# Patient Record
Sex: MALE | Race: WHITE | Employment: OTHER | URBAN - NONMETROPOLITAN AREA
[De-identification: names, ages, dates, MRNs, and addresses within clinical notes are randomized per-mention and may not be internally consistent; named-entity substitution may affect disease eponyms.]

---

## 2019-02-27 DIAGNOSIS — F41.1 GENERALIZED ANXIETY DISORDER: ICD-10-CM

## 2019-03-04 DIAGNOSIS — C44.92 SQUAMOUS CELL SKIN CANCER: ICD-10-CM

## 2019-03-04 RX ORDER — LORAZEPAM 1 MG/1
1 TABLET ORAL NIGHTLY
Qty: 30 TABLET | Refills: 3 | Status: SHIPPED | OUTPATIENT
Start: 2019-03-04 | End: 2019-04-03

## 2019-04-10 ENCOUNTER — OUTSIDE SERVICES (OUTPATIENT)
Dept: FAMILY MEDICINE CLINIC | Age: 74
End: 2019-04-10
Payer: MEDICARE

## 2019-04-10 DIAGNOSIS — B35.3 TINEA PEDIS OF BOTH FEET: Primary | ICD-10-CM

## 2019-04-10 RX ORDER — ACETAMINOPHEN 325 MG/1
650 TABLET ORAL EVERY 6 HOURS PRN
COMMUNITY

## 2019-04-10 RX ORDER — LORAZEPAM 1 MG/1
1 TABLET ORAL NIGHTLY
COMMUNITY
End: 2019-06-13

## 2019-04-10 RX ORDER — MIRTAZAPINE 15 MG/1
15 TABLET, FILM COATED ORAL NIGHTLY
COMMUNITY

## 2019-04-10 RX ORDER — KETOCONAZOLE, MICRONIZED 100 %
POWDER (GRAM) MISCELLANEOUS DAILY PRN
COMMUNITY
End: 2019-06-13

## 2019-04-10 RX ORDER — PRAVASTATIN SODIUM 40 MG
40 TABLET ORAL DAILY
COMMUNITY
End: 2022-04-26 | Stop reason: SDUPTHER

## 2019-04-10 RX ORDER — CETIRIZINE HYDROCHLORIDE 10 MG/1
10 TABLET ORAL DAILY
COMMUNITY

## 2019-04-10 RX ORDER — KETOCONAZOLE 20 MG/G
CREAM TOPICAL 2 TIMES DAILY
COMMUNITY
End: 2019-06-13

## 2019-04-10 RX ORDER — LEVETIRACETAM 500 MG/1
1500 TABLET ORAL 2 TIMES DAILY
COMMUNITY

## 2019-04-10 ASSESSMENT — ENCOUNTER SYMPTOMS
SHORTNESS OF BREATH: 0
ALLERGIC/IMMUNOLOGIC NEGATIVE: 1
COUGH: 0
EYES NEGATIVE: 1

## 2019-04-10 NOTE — PROGRESS NOTES
Elina Heard is a 68 y.o. male who presents today for his medical conditions/complaints as noted below. Chief Complaint   Patient presents with    Rash           HPI:     HPI     Seen today for complaints to rash on bilateral feet. Right greater than left. States it started about a month ago and he was trying to make it better by applying Thomas Bhargav to it but that made it burn a lot. States areas are very itching and have spread to the tops of his feet. Current Outpatient Medications   Medication Sig Dispense Refill    zonisamide (ZONEGRAN) 100 MG capsule Take 100 mg by mouth nightly. 2 capsules at bedtime      OXcarbazepine (TRILEPTAL) 600 MG tablet Take 600 mg by mouth 2 times daily.  phenytoin (DILANTIN) 100 MG ER capsule Take 100 mg by mouth 2 times daily. 2 capsule 2 times daily      Calcium Carbonate-Vit D-Min (CALTRATE 600+D PLUS MINERALS) 600-800 MG-UNIT TABS Take 1 tablet by mouth 2 times daily.  Multiple Vitamins-Minerals (CENTRUM SILVER PO) Take 1 tablet by mouth daily.  Aspirin (ANACIN PO) Take  by mouth as needed. No current facility-administered medications for this visit. No Known Allergies    Subjective:      Review of Systems   Constitutional: Negative. HENT: Negative. Eyes: Negative. Respiratory: Negative for cough and shortness of breath. Cardiovascular: Negative. Negative for leg swelling. Endocrine: Negative. Genitourinary: Negative. Skin: Positive for rash. Allergic/Immunologic: Negative. Hematological: Negative. Psychiatric/Behavioral: Negative. Objective: There were no vitals taken for this visit. Physical Exam   Constitutional: He is oriented to person, place, and time. He appears well-developed and well-nourished. HENT:   Head: Normocephalic and atraumatic. Mouth/Throat: Oropharynx is clear and moist.   Eyes: Pupils are equal, round, and reactive to light.  Conjunctivae and EOM are normal.   Neck: Normal range of motion. Cardiovascular: Intact distal pulses. Pulmonary/Chest: Effort normal.   Abdominal: Soft. Musculoskeletal: Normal range of motion. He exhibits no edema, tenderness or deformity. Feet:   Left Foot:   Skin Integrity: Positive for erythema and dry skin. Neurological: He is alert and oriented to person, place, and time. Skin: Skin is warm and dry. Capillary refill takes 2 to 3 seconds. Rash noted. Rash is macular, papular and urticarial. There is erythema. Psychiatric: He has a normal mood and affect. His behavior is normal. Judgment and thought content normal.         Assessment:       Diagnosis Orders   1. Tinea pedis of both feet         Plan:     1. Will start Ketaconazole 2 % cream to feet bid after cleaning. Powder to shoes. Clean shower and dry socks, change every day. If no improvement will add soaks of Dakin's solution.      Electronically signed by ROBINA Kate CNP on 4/10/2019 at 2:20 PM

## 2019-06-13 ENCOUNTER — OUTSIDE SERVICES (OUTPATIENT)
Dept: FAMILY MEDICINE CLINIC | Age: 74
End: 2019-06-13
Payer: MEDICARE

## 2019-06-13 VITALS
WEIGHT: 172.8 LBS | RESPIRATION RATE: 16 BRPM | BODY MASS INDEX: 28.1 KG/M2 | HEART RATE: 81 BPM | OXYGEN SATURATION: 96 % | DIASTOLIC BLOOD PRESSURE: 76 MMHG | SYSTOLIC BLOOD PRESSURE: 140 MMHG | TEMPERATURE: 97.6 F

## 2019-06-13 DIAGNOSIS — Z85.89 HX OF SQUAMOUS CELL CARCINOMA: ICD-10-CM

## 2019-06-13 DIAGNOSIS — I10 ESSENTIAL HYPERTENSION: Primary | ICD-10-CM

## 2019-06-13 DIAGNOSIS — G40.909 SEIZURE DISORDER (HCC): ICD-10-CM

## 2019-06-13 RX ORDER — AMLODIPINE BESYLATE 5 MG/1
5 TABLET ORAL DAILY
COMMUNITY

## 2019-06-13 RX ORDER — LISINOPRIL 10 MG/1
10 TABLET ORAL DAILY
COMMUNITY
End: 2022-04-26 | Stop reason: SDUPTHER

## 2019-06-13 ASSESSMENT — ENCOUNTER SYMPTOMS
EYE REDNESS: 0
RHINORRHEA: 0
CONSTIPATION: 0
COLOR CHANGE: 1
SHORTNESS OF BREATH: 0
VOMITING: 0
DIARRHEA: 0
COUGH: 0
SORE THROAT: 0
WHEEZING: 0
NAUSEA: 0

## 2019-06-13 NOTE — PROGRESS NOTES
tablet Take 10 mg by mouth daily      acetaminophen (TYLENOL) 325 MG tablet Take 650 mg by mouth every 6 hours as needed for Pain      mirtazapine (REMERON) 15 MG tablet Take 15 mg by mouth nightly      pravastatin (PRAVACHOL) 40 MG tablet Take 40 mg by mouth daily      Hypromellose (GENTEAL MILD TO MODERATE) 0.3 % SOLN Apply 1 drop to eye daily      levETIRAcetam (KEPPRA) 500 MG tablet Take 1,250 mg by mouth 2 times daily       cetirizine (ZYRTEC) 10 MG tablet Take 10 mg by mouth daily      Multiple Vitamins-Minerals (CENTRUM SILVER PO) Take 1 tablet by mouth daily. No current facility-administered medications for this visit. No Known Allergies    Health Maintenance   Topic Date Due    AAA screen  1945    Hepatitis C screen  1945    DTaP/Tdap/Td vaccine (1 - Tdap) 11/11/1964    Lipid screen  11/11/1985    Shingles Vaccine (1 of 2) 11/11/1995    Colon cancer screen colonoscopy  11/11/1995    Pneumococcal 65+ years Vaccine (1 of 2 - PCV13) 11/11/2010    Flu vaccine (Season Ended) 09/01/2019       Subjective:      Review of Systems   Constitutional: Positive for fatigue. Negative for chills, fever and unexpected weight change. HENT: Positive for hearing loss. Negative for congestion, rhinorrhea and sore throat. Eyes: Negative for redness and visual disturbance. Respiratory: Negative for cough, shortness of breath and wheezing. Cardiovascular: Negative for chest pain and leg swelling. Gastrointestinal: Negative for constipation, diarrhea, nausea and vomiting. Endocrine: Negative for polydipsia and polyuria. Genitourinary: Negative for dysuria, frequency and hematuria. Musculoskeletal: Negative for arthralgias, gait problem and myalgias. Skin: Positive for color change. Negative for rash and wound. Multiple seborrheic keratosis on back   Allergic/Immunologic: Negative for environmental allergies and immunocompromised state.    Neurological: Positive for seizures (2 in the past week), weakness and headaches. Negative for dizziness and light-headedness. Hematological: Does not bruise/bleed easily. Psychiatric/Behavioral: Negative for dysphoric mood and sleep disturbance. The patient is not nervous/anxious. Objective:     Physical Exam   Constitutional: He is oriented to person, place, and time. He appears well-developed and well-nourished. No distress. HENT:   Head: Normocephalic and atraumatic. Right Ear: External ear normal.   Left Ear: External ear normal.   Nose: Nose normal.   Mouth/Throat: Oropharynx is clear and moist and mucous membranes are normal.   Eyes: Conjunctivae and EOM are normal. Right eye exhibits no discharge. Left eye exhibits no discharge. No scleral icterus. Neck: Neck supple. No JVD present. No thyromegaly present. Cardiovascular: Normal rate, regular rhythm, normal heart sounds and intact distal pulses. Pulmonary/Chest: Effort normal and breath sounds normal. No stridor. No respiratory distress. He has no wheezes. He has no rales. Abdominal: Soft. Bowel sounds are normal. He exhibits no distension. There is no tenderness. Musculoskeletal: Normal range of motion. He exhibits no edema or deformity. Right shoulder: He exhibits no tenderness, no bony tenderness and no pain. Left shoulder: He exhibits no tenderness, no bony tenderness and no pain. Cervical back: He exhibits no tenderness, no bony tenderness and no pain. Thoracic back: He exhibits no tenderness, no bony tenderness, no pain and no spasm. Lumbar back: He exhibits no tenderness, no bony tenderness and no pain. Lymphadenopathy:     He has no cervical adenopathy. Right: No supraclavicular adenopathy present. Left: No supraclavicular adenopathy present. Neurological: He is alert and oriented to person, place, and time. He displays no atrophy. He exhibits normal muscle tone. He displays no seizure activity.

## 2019-07-01 ENCOUNTER — OUTSIDE SERVICES (OUTPATIENT)
Dept: FAMILY MEDICINE CLINIC | Age: 74
End: 2019-07-01

## 2019-07-01 VITALS
SYSTOLIC BLOOD PRESSURE: 142 MMHG | BODY MASS INDEX: 27.62 KG/M2 | HEART RATE: 78 BPM | WEIGHT: 169.8 LBS | DIASTOLIC BLOOD PRESSURE: 78 MMHG | TEMPERATURE: 97.9 F | OXYGEN SATURATION: 96 % | RESPIRATION RATE: 20 BRPM

## 2019-07-01 DIAGNOSIS — K04.7 DENTAL ABSCESS: Primary | ICD-10-CM

## 2019-07-01 ASSESSMENT — ENCOUNTER SYMPTOMS
DIARRHEA: 0
CONSTIPATION: 0
COUGH: 0
NAUSEA: 0
WHEEZING: 0
SORE THROAT: 0
RHINORRHEA: 0
VOMITING: 0
SHORTNESS OF BREATH: 0

## 2019-07-01 NOTE — PROGRESS NOTES
Negative for dysuria, frequency and urgency. Musculoskeletal: Negative for arthralgias and myalgias. Skin: Negative for pallor and rash. Objective:     BP (!) 142/78   Pulse 78   Temp 97.9 °F (36.6 °C)   Resp 20   Wt 169 lb 12.8 oz (77 kg)   SpO2 96%   BMI 27.62 kg/m²     Physical Exam   Constitutional: He is oriented to person, place, and time. He appears well-developed and well-nourished. No distress. HENT:   Head: Normocephalic and atraumatic. Mouth/Throat: Abnormal dentition. Dental abscesses present. Eyes: Right eye exhibits no discharge. Left eye exhibits no discharge. No scleral icterus. Cardiovascular: Normal rate, regular rhythm and intact distal pulses. Pulmonary/Chest: Effort normal and breath sounds normal. No respiratory distress. He has no wheezes. Abdominal: Soft. Bowel sounds are normal. He exhibits no distension. There is no tenderness. Musculoskeletal: He exhibits no edema or deformity. Neurological: He is alert and oriented to person, place, and time. Skin: Skin is warm and dry. Nursing note and vitals reviewed. Assessment:       Diagnosis Orders   1. Dental abscess         Plan:     1. Dental abscess - Will begin amoxicillin for abscess. Will need a dentist appointment as it appears there may be a root showing. Tramadol for pain q6h PRN for 7 days. Discussed with patient and he is agreeable. Sister is POA and he does not want to bother her while on vacation.

## 2019-12-18 ENCOUNTER — HOSPITAL ENCOUNTER (OUTPATIENT)
Dept: CT IMAGING | Age: 74
Discharge: HOME | End: 2019-12-18
Payer: MEDICARE

## 2019-12-18 DIAGNOSIS — K13.21 ORAL LEUKOPLAKIA: ICD-10-CM

## 2019-12-18 DIAGNOSIS — Z87.891 SMOKING HISTORY: ICD-10-CM

## 2019-12-18 LAB — POC CREATININE WHOLE BLOOD: 0.9 MG/DL (ref 0.5–1.2)

## 2019-12-18 PROCEDURE — 82565 ASSAY OF CREATININE: CPT

## 2019-12-18 PROCEDURE — 71260 CT THORAX DX C+: CPT

## 2019-12-18 PROCEDURE — 6360000004 HC RX CONTRAST MEDICATION: Performed by: OTOLARYNGOLOGY

## 2019-12-18 PROCEDURE — 70491 CT SOFT TISSUE NECK W/DYE: CPT

## 2019-12-18 RX ADMIN — IOPAMIDOL 85 ML: 755 INJECTION, SOLUTION INTRAVENOUS at 07:44

## 2020-01-01 ENCOUNTER — APPOINTMENT (OUTPATIENT)
Dept: GENERAL RADIOLOGY | Age: 75
End: 2020-01-01
Payer: MEDICARE

## 2020-01-01 ENCOUNTER — HOSPITAL ENCOUNTER (EMERGENCY)
Age: 75
Discharge: HOME | End: 2020-01-01
Attending: EMERGENCY MEDICINE
Payer: MEDICARE

## 2020-01-01 VITALS
DIASTOLIC BLOOD PRESSURE: 62 MMHG | WEIGHT: 156 LBS | TEMPERATURE: 97.7 F | RESPIRATION RATE: 15 BRPM | BODY MASS INDEX: 24.48 KG/M2 | OXYGEN SATURATION: 97 % | SYSTOLIC BLOOD PRESSURE: 129 MMHG | HEIGHT: 67 IN | HEART RATE: 63 BPM

## 2020-01-01 LAB
ANION GAP SERPL CALCULATED.3IONS-SCNC: 11 MEQ/L (ref 8–16)
BASOPHILS # BLD: 0.5 %
BASOPHILS ABSOLUTE: 0 THOU/MM3 (ref 0–0.1)
BUN BLDV-MCNC: 17 MG/DL (ref 7–22)
CALCIUM SERPL-MCNC: 8.8 MG/DL (ref 8.5–10.5)
CHLORIDE BLD-SCNC: 109 MEQ/L (ref 98–111)
CO2: 21 MEQ/L (ref 23–33)
CREAT SERPL-MCNC: 0.8 MG/DL (ref 0.4–1.2)
EKG ATRIAL RATE: 69 BPM
EKG P AXIS: 69 DEGREES
EKG P-R INTERVAL: 154 MS
EKG Q-T INTERVAL: 414 MS
EKG QRS DURATION: 84 MS
EKG QTC CALCULATION (BAZETT): 443 MS
EKG R AXIS: 24 DEGREES
EKG T AXIS: 35 DEGREES
EKG VENTRICULAR RATE: 69 BPM
EOSINOPHIL # BLD: 4.7 %
EOSINOPHILS ABSOLUTE: 0.3 THOU/MM3 (ref 0–0.4)
ERYTHROCYTE [DISTWIDTH] IN BLOOD BY AUTOMATED COUNT: 12.6 % (ref 11.5–14.5)
ERYTHROCYTE [DISTWIDTH] IN BLOOD BY AUTOMATED COUNT: 44.8 FL (ref 35–45)
GFR SERPL CREATININE-BSD FRML MDRD: > 90 ML/MIN/1.73M2
GLUCOSE BLD-MCNC: 88 MG/DL (ref 70–108)
HCT VFR BLD CALC: 38.4 % (ref 42–52)
HEMOGLOBIN: 13.3 GM/DL (ref 14–18)
IMMATURE GRANS (ABS): 0.02 THOU/MM3 (ref 0–0.07)
IMMATURE GRANULOCYTES: 0.4 %
LYMPHOCYTES # BLD: 19 %
LYMPHOCYTES ABSOLUTE: 1 THOU/MM3 (ref 1–4.8)
MCH RBC QN AUTO: 33.5 PG (ref 26–33)
MCHC RBC AUTO-ENTMCNC: 34.6 GM/DL (ref 32.2–35.5)
MCV RBC AUTO: 96.7 FL (ref 80–94)
MONOCYTES # BLD: 8.9 %
MONOCYTES ABSOLUTE: 0.5 THOU/MM3 (ref 0.4–1.3)
NUCLEATED RED BLOOD CELLS: 0 /100 WBC
OSMOLALITY CALCULATION: 282.2 MOSMOL/KG (ref 275–300)
PLATELET # BLD: 204 THOU/MM3 (ref 130–400)
PMV BLD AUTO: 9.9 FL (ref 9.4–12.4)
POTASSIUM SERPL-SCNC: 4.5 MEQ/L (ref 3.5–5.2)
RBC # BLD: 3.97 MILL/MM3 (ref 4.7–6.1)
SEG NEUTROPHILS: 66.5 %
SEGMENTED NEUTROPHILS ABSOLUTE COUNT: 3.7 THOU/MM3 (ref 1.8–7.7)
SODIUM BLD-SCNC: 141 MEQ/L (ref 135–145)
TROPONIN T: < 0.01 NG/ML
WBC # BLD: 5.5 THOU/MM3 (ref 4.8–10.8)

## 2020-01-01 PROCEDURE — 85025 COMPLETE CBC W/AUTO DIFF WBC: CPT

## 2020-01-01 PROCEDURE — 99284 EMERGENCY DEPT VISIT MOD MDM: CPT

## 2020-01-01 PROCEDURE — 93005 ELECTROCARDIOGRAM TRACING: CPT | Performed by: EMERGENCY MEDICINE

## 2020-01-01 PROCEDURE — 73030 X-RAY EXAM OF SHOULDER: CPT

## 2020-01-01 PROCEDURE — 96374 THER/PROPH/DIAG INJ IV PUSH: CPT

## 2020-01-01 PROCEDURE — 93010 ELECTROCARDIOGRAM REPORT: CPT | Performed by: INTERNAL MEDICINE

## 2020-01-01 PROCEDURE — 80048 BASIC METABOLIC PNL TOTAL CA: CPT

## 2020-01-01 PROCEDURE — 84484 ASSAY OF TROPONIN QUANT: CPT

## 2020-01-01 PROCEDURE — 96375 TX/PRO/DX INJ NEW DRUG ADDON: CPT

## 2020-01-01 PROCEDURE — 6360000002 HC RX W HCPCS: Performed by: EMERGENCY MEDICINE

## 2020-01-01 PROCEDURE — 36415 COLL VENOUS BLD VENIPUNCTURE: CPT

## 2020-01-01 RX ORDER — LOPERAMIDE HYDROCHLORIDE 2 MG/1
2 CAPSULE ORAL 4 TIMES DAILY PRN
COMMUNITY

## 2020-01-01 RX ORDER — METOCLOPRAMIDE HYDROCHLORIDE 5 MG/ML
10 INJECTION INTRAMUSCULAR; INTRAVENOUS ONCE
Status: COMPLETED | OUTPATIENT
Start: 2020-01-01 | End: 2020-01-01

## 2020-01-01 RX ORDER — KETOROLAC TROMETHAMINE 30 MG/ML
30 INJECTION, SOLUTION INTRAMUSCULAR; INTRAVENOUS ONCE
Status: COMPLETED | OUTPATIENT
Start: 2020-01-01 | End: 2020-01-01

## 2020-01-01 RX ADMIN — METOCLOPRAMIDE 10 MG: 5 INJECTION, SOLUTION INTRAMUSCULAR; INTRAVENOUS at 13:03

## 2020-01-01 RX ADMIN — KETOROLAC TROMETHAMINE 30 MG: 30 INJECTION, SOLUTION INTRAMUSCULAR at 13:04

## 2020-01-01 ASSESSMENT — PAIN DESCRIPTION - PAIN TYPE
TYPE: ACUTE PAIN

## 2020-01-01 ASSESSMENT — PAIN SCALES - GENERAL
PAINLEVEL_OUTOF10: 4
PAINLEVEL_OUTOF10: 4
PAINLEVEL_OUTOF10: 9

## 2020-01-01 ASSESSMENT — PAIN DESCRIPTION - DESCRIPTORS: DESCRIPTORS: THROBBING

## 2020-01-01 ASSESSMENT — PAIN DESCRIPTION - ORIENTATION: ORIENTATION: RIGHT

## 2020-01-01 ASSESSMENT — PAIN DESCRIPTION - FREQUENCY: FREQUENCY: INTERMITTENT

## 2020-01-01 NOTE — ED NOTES
Pt discharged with instructions, verbalized understanding. Pt given opportunity to ask questions and discuss plan of care with treatment team.  Pt denies additional needs at present time and is in agreement with current plan of care. Pt refused need for w/c assistance. Ambulated out of ED with steady gait in stable condition.        Anuja Flowers RN  01/01/20 [id number]

## 2020-01-01 NOTE — ED PROVIDER NOTES
[facility]  eMERGENCY dEPARTMENT eNCOUnter          [address] [facility]       Chief Complaint   Patient presents with    Arm Pain    Shoulder Pain    Facial Pain    Seizures       Nurses Notes reviewed and I agree except as noted in the HPI. HISTORY OF PRESENT ILLNESS    Jacqueline Vides is a 76 y.o. male. Patient reports a \"blackout episode\" that occurred this morning, described as a possible seizure-like incident. Patient reports that he has a history of epileptic seizures since high school. Family of the patient report that his neurologist has been adjusting his seizure medications lately, with last adjustment reported in October. Patient reports that seizures result in these \"blackout episodes\" that last approximately 10 to 15 minutes. Patient notes that he fell during the incident this morning and has pain to the right shoulder, radiating down the right arm, as well as less significant pain to the right jaw. Patient describes additional symptom of headache but notes that this is normal at baseline. Patient denies chest pain and shortness of breath. Patient additionally has a history of anxiety, cancer, hyperlipidemia, and chronic obstructive pulmonary disease. REVIEW OF SYSTEMS     Constitutional: no fever or chills  Respiratory: no dyspnea  Cardiovascular: no chest pain  Gastrointestinal : no abdominal pain      Remainder of review of systems is otherwise reviewed as negative. PAST MEDICAL HISTORY    has a past medical history of Anxiety disorder, Cancer ([address] COPD (chronic obstructive pulmonary disease) ([facility] [address] HLD (hyperlipidemia), and Seizure disorder ([facility] [address]     SURGICAL HISTORY      has a past surgical history that includes Hemorrhoid surgery and Cataract removal.    CURRENT MEDICATIONS       Previous Medications    ACETAMINOPHEN (TYLENOL) 325 MG TABLET    Take 650 mg by mouth every 6 hours as needed for Pain    AMLODIPINE (NORVASC) 5 MG TABLET    Take 5 mg by mouth daily based the provider's statements to the medical scribe. Signed by: Emily Mcknight, 12:50 PM 01/01/20     Provider: The information in this document, created by the medical scribe for me, accurately reflects the services I personally performed and the decisions made by me.      Jeremy Glez DO. 12:50 PM 01/01/20           Jeremy Glez DO  01/01/20 1437

## 2020-01-01 NOTE — ED NOTES
Per statement of sister at bedside, pt has been having \"blackout episodes,\" which have been diagnosed by his neurologist.  He does not lose consciousness completely, but will continue to speak. He has no remembrance of these episodes. The neurologist has been adjusting his seizure medications.        Kait Katz RN  01/01/20 2222

## 2020-03-25 ENCOUNTER — OUTSIDE SERVICES (OUTPATIENT)
Dept: FAMILY MEDICINE CLINIC | Age: 75
End: 2020-03-25
Payer: MEDICARE

## 2020-03-25 VITALS
DIASTOLIC BLOOD PRESSURE: 99 MMHG | OXYGEN SATURATION: 95 % | WEIGHT: 160 LBS | SYSTOLIC BLOOD PRESSURE: 157 MMHG | HEART RATE: 90 BPM | BODY MASS INDEX: 25.06 KG/M2 | TEMPERATURE: 97.8 F | RESPIRATION RATE: 16 BRPM

## 2020-03-25 PROBLEM — R39.198 DIFFICULTY URINATING: Status: ACTIVE | Noted: 2020-03-25

## 2020-03-25 PROBLEM — F41.1 GENERALIZED ANXIETY DISORDER: Status: ACTIVE | Noted: 2020-03-25

## 2020-03-25 ASSESSMENT — ENCOUNTER SYMPTOMS
RHINORRHEA: 0
VOMITING: 0
NAUSEA: 0
SORE THROAT: 0
CONSTIPATION: 0
COUGH: 0
SHORTNESS OF BREATH: 0
WHEEZING: 0
EYE REDNESS: 0
DIARRHEA: 0

## 2020-03-25 NOTE — PROGRESS NOTES
Naheed Garcia is a 76 y.o. male who presents today for his medical conditions/complaints as noted below. Chief Complaint   Patient presents with    3 Month Follow-Up           HPI:     Chris is seen in his apartment today. He states he is okay. Denies any concerns except for difficulty starting the flow of his urine. States he feels like it could be bowel related, as he does feel constipated. He is followed by neurology for his seizure disorder and denies any recent seizures. No recent falls.        Past Medical History:   Diagnosis Date    Anxiety disorder     Cancer ([facility] [address]     Squamous Cell Cancer    COPD (chronic obstructive pulmonary disease) ([facility] [address]     HLD (hyperlipidemia)     Seizure disorder (HCC)       Past Surgical History:   Procedure Laterality Date    CATARACT REMOVAL      HEMORRHOID SURGERY         Family History   Problem Relation Age of Onset    Hypertension Mother     Stroke Father        Social History     Tobacco Use    Smoking status: Former Smoker     Packs/day: 0.25     Years: 53.00     Pack years: 13.25     Types: Cigarettes     Last attempt to quit: 12/10/2018     Years since quittin.2    Smokeless tobacco: Never Used   Substance Use Topics    Alcohol use: No      No Known Allergies    Health Maintenance   Topic Date Due    AAA screen  1945    Hepatitis C screen  1945    Lipid screen  1955    DTaP/Tdap/Td vaccine (1 - Tdap) 1964    Shingles Vaccine (1 of 2) 1995    Colon cancer screen colonoscopy  1995    Pneumococcal 65+ years Vaccine (1 of 1 - PPSV23) 2010    Flu vaccine (1) 2019    Annual Wellness Visit (AWV)  2019    Potassium monitoring  2021    Creatinine monitoring  2021    Hepatitis A vaccine  Aged Out    Hepatitis B vaccine  Aged Out    Hib vaccine  Aged Out    Meningococcal (ACWY) vaccine  Aged Out       Subjective:      Review of Systems   Constitutional: Negative for chills, fatigue sounds are normal. There is no distension. Palpations: Abdomen is soft. Tenderness: There is no abdominal tenderness. Musculoskeletal: Normal range of motion. General: No deformity. Right shoulder: He exhibits no tenderness, no bony tenderness and no pain. Left shoulder: He exhibits no tenderness, no bony tenderness and no pain. Cervical back: He exhibits no tenderness, no bony tenderness and no pain. Thoracic back: He exhibits no tenderness, no bony tenderness, no pain and no spasm. Lumbar back: He exhibits no tenderness, no bony tenderness and no pain. Lymphadenopathy:      Cervical: No cervical adenopathy. Upper Body:      Right upper body: No supraclavicular adenopathy. Left upper body: No supraclavicular adenopathy. Skin:     General: Skin is warm and dry. Findings: No erythema or rash. Neurological:      Mental Status: He is alert and oriented to person, place, and time. Motor: No atrophy, abnormal muscle tone or seizure activity. Psychiatric:         Mood and Affect: Mood normal.         Speech: Speech normal.         Behavior: Behavior normal.         Cognition and Memory: Cognition and memory normal.       BP (!) 157/99   Pulse 90   Temp 97.8 °F (36.6 °C)   Resp 16   Wt 160 lb (72.6 kg)   SpO2 95%   BMI 25.06 kg/m²     Assessment/Plan      1. Essential hypertension   Blood pressure elevated. Will increase Lisinopril and continue Norvasc. Labs overall stable. 2. Seizure disorder ([address]   Followed by neuro in [address]. Continue current meds and lab monitoring. 3. Generalized anxiety disorder   Chronic and continue current meds and sleeping well. 4. Hx of squamous cell carcinoma -- no changes in skin. Continue to monitor. 5. Difficulty urinating - increase in difficulty with urinating. Will check PSA and add Flomax. 6. Other constipation -- Will add Colace and monitor bowels.       Resident has HTN, seizure disorder,

## 2020-05-17 ENCOUNTER — HOSPITAL ENCOUNTER (EMERGENCY)
Age: 75
Discharge: HOME | End: 2020-05-17
Payer: MEDICARE

## 2020-05-17 ENCOUNTER — APPOINTMENT (OUTPATIENT)
Dept: GENERAL RADIOLOGY | Age: 75
End: 2020-05-17
Payer: MEDICARE

## 2020-05-17 ENCOUNTER — APPOINTMENT (OUTPATIENT)
Dept: CT IMAGING | Age: 75
End: 2020-05-17
Payer: MEDICARE

## 2020-05-17 VITALS
TEMPERATURE: 97.8 F | HEART RATE: 85 BPM | WEIGHT: 178 LBS | RESPIRATION RATE: 16 BRPM | BODY MASS INDEX: 27.94 KG/M2 | HEIGHT: 67 IN | DIASTOLIC BLOOD PRESSURE: 68 MMHG | SYSTOLIC BLOOD PRESSURE: 144 MMHG | OXYGEN SATURATION: 97 %

## 2020-05-17 LAB
ALBUMIN SERPL-MCNC: 4.1 G/DL (ref 3.5–5.1)
ALP BLD-CCNC: 145 U/L (ref 38–126)
ALT SERPL-CCNC: 21 U/L (ref 11–66)
ANION GAP SERPL CALCULATED.3IONS-SCNC: 10 MEQ/L (ref 8–16)
AST SERPL-CCNC: 25 U/L (ref 5–40)
BASOPHILS # BLD: 0.8 %
BASOPHILS ABSOLUTE: 0 THOU/MM3 (ref 0–0.1)
BILIRUB SERPL-MCNC: 0.4 MG/DL (ref 0.3–1.2)
BILIRUBIN DIRECT: < 0.2 MG/DL (ref 0–0.3)
BUN BLDV-MCNC: 19 MG/DL (ref 7–22)
CALCIUM SERPL-MCNC: 9.4 MG/DL (ref 8.5–10.5)
CHLORIDE BLD-SCNC: 105 MEQ/L (ref 98–111)
CO2: 24 MEQ/L (ref 23–33)
CREAT SERPL-MCNC: 0.8 MG/DL (ref 0.4–1.2)
EKG ATRIAL RATE: 76 BPM
EKG P AXIS: 65 DEGREES
EKG P-R INTERVAL: 156 MS
EKG Q-T INTERVAL: 390 MS
EKG QRS DURATION: 92 MS
EKG QTC CALCULATION (BAZETT): 438 MS
EKG R AXIS: 30 DEGREES
EKG T AXIS: 49 DEGREES
EKG VENTRICULAR RATE: 76 BPM
EOSINOPHIL # BLD: 4.5 %
EOSINOPHILS ABSOLUTE: 0.2 THOU/MM3 (ref 0–0.4)
ERYTHROCYTE [DISTWIDTH] IN BLOOD BY AUTOMATED COUNT: 12.4 % (ref 11.5–14.5)
ERYTHROCYTE [DISTWIDTH] IN BLOOD BY AUTOMATED COUNT: 43.4 FL (ref 35–45)
GFR SERPL CREATININE-BSD FRML MDRD: > 90 ML/MIN/1.73M2
GLUCOSE BLD-MCNC: 101 MG/DL (ref 70–108)
HCT VFR BLD CALC: 40.8 % (ref 42–52)
HEMOGLOBIN: 13.8 GM/DL (ref 14–18)
IMMATURE GRANS (ABS): 0.01 THOU/MM3 (ref 0–0.07)
IMMATURE GRANULOCYTES: 0.2 %
LYMPHOCYTES # BLD: 23.4 %
LYMPHOCYTES ABSOLUTE: 1.2 THOU/MM3 (ref 1–4.8)
MAGNESIUM: 2.2 MG/DL (ref 1.6–2.4)
MCH RBC QN AUTO: 32.6 PG (ref 26–33)
MCHC RBC AUTO-ENTMCNC: 33.8 GM/DL (ref 32.2–35.5)
MCV RBC AUTO: 96.5 FL (ref 80–94)
MONOCYTES # BLD: 10.2 %
MONOCYTES ABSOLUTE: 0.5 THOU/MM3 (ref 0.4–1.3)
NUCLEATED RED BLOOD CELLS: 0 /100 WBC
OSMOLALITY CALCULATION: 279.9 MOSMOL/KG (ref 275–300)
PLATELET # BLD: 200 THOU/MM3 (ref 130–400)
PMV BLD AUTO: 9.7 FL (ref 9.4–12.4)
POTASSIUM SERPL-SCNC: 4.1 MEQ/L (ref 3.5–5.2)
PROLACTIN: 6.2 NG/ML
RBC # BLD: 4.23 MILL/MM3 (ref 4.7–6.1)
SEG NEUTROPHILS: 60.9 %
SEGMENTED NEUTROPHILS ABSOLUTE COUNT: 3.1 THOU/MM3 (ref 1.8–7.7)
SODIUM BLD-SCNC: 139 MEQ/L (ref 135–145)
TOTAL PROTEIN: 7 G/DL (ref 6.1–8)
TROPONIN T: < 0.01 NG/ML
WBC # BLD: 5.1 THOU/MM3 (ref 4.8–10.8)

## 2020-05-17 PROCEDURE — 84484 ASSAY OF TROPONIN QUANT: CPT

## 2020-05-17 PROCEDURE — 6360000002 HC RX W HCPCS: Performed by: PHYSICIAN ASSISTANT

## 2020-05-17 PROCEDURE — 36415 COLL VENOUS BLD VENIPUNCTURE: CPT

## 2020-05-17 PROCEDURE — 83735 ASSAY OF MAGNESIUM: CPT

## 2020-05-17 PROCEDURE — 80177 DRUG SCRN QUAN LEVETIRACETAM: CPT

## 2020-05-17 PROCEDURE — 99285 EMERGENCY DEPT VISIT HI MDM: CPT

## 2020-05-17 PROCEDURE — 93010 ELECTROCARDIOGRAM REPORT: CPT | Performed by: INTERNAL MEDICINE

## 2020-05-17 PROCEDURE — 80053 COMPREHEN METABOLIC PANEL: CPT

## 2020-05-17 PROCEDURE — 72125 CT NECK SPINE W/O DYE: CPT

## 2020-05-17 PROCEDURE — 90715 TDAP VACCINE 7 YRS/> IM: CPT | Performed by: PHYSICIAN ASSISTANT

## 2020-05-17 PROCEDURE — 73080 X-RAY EXAM OF ELBOW: CPT

## 2020-05-17 PROCEDURE — 84146 ASSAY OF PROLACTIN: CPT

## 2020-05-17 PROCEDURE — 72100 X-RAY EXAM L-S SPINE 2/3 VWS: CPT

## 2020-05-17 PROCEDURE — 93005 ELECTROCARDIOGRAM TRACING: CPT | Performed by: PHYSICIAN ASSISTANT

## 2020-05-17 PROCEDURE — 70450 CT HEAD/BRAIN W/O DYE: CPT

## 2020-05-17 PROCEDURE — 85025 COMPLETE CBC W/AUTO DIFF WBC: CPT

## 2020-05-17 PROCEDURE — 82248 BILIRUBIN DIRECT: CPT

## 2020-05-17 PROCEDURE — 90471 IMMUNIZATION ADMIN: CPT | Performed by: PHYSICIAN ASSISTANT

## 2020-05-17 PROCEDURE — 6370000000 HC RX 637 (ALT 250 FOR IP)

## 2020-05-17 RX ORDER — ACETAMINOPHEN 325 MG/1
TABLET ORAL
Status: COMPLETED
Start: 2020-05-17 | End: 2020-05-17

## 2020-05-17 RX ORDER — ACETAMINOPHEN 325 MG/1
650 TABLET ORAL ONCE
Status: COMPLETED | OUTPATIENT
Start: 2020-05-17 | End: 2020-05-17

## 2020-05-17 RX ORDER — BACITRACIN, NEOMYCIN, POLYMYXIN B 400; 3.5; 5 [USP'U]/G; MG/G; [USP'U]/G
OINTMENT TOPICAL ONCE
Status: COMPLETED | OUTPATIENT
Start: 2020-05-17 | End: 2020-05-17

## 2020-05-17 RX ORDER — BACITRACIN, NEOMYCIN, POLYMYXIN B 400; 3.5; 5 [USP'U]/G; MG/G; [USP'U]/G
OINTMENT TOPICAL
Status: COMPLETED
Start: 2020-05-17 | End: 2020-05-17

## 2020-05-17 RX ADMIN — BACITRACIN, NEOMYCIN, POLYMYXIN B 1 G: 400; 3.5; 5 OINTMENT TOPICAL at 10:22

## 2020-05-17 RX ADMIN — ACETAMINOPHEN 650 MG: 325 TABLET ORAL at 10:17

## 2020-05-17 RX ADMIN — TETANUS TOXOID, REDUCED DIPHTHERIA TOXOID AND ACELLULAR PERTUSSIS VACCINE, ADSORBED 0.5 ML: 5; 2.5; 8; 8; 2.5 SUSPENSION INTRAMUSCULAR at 10:18

## 2020-05-17 ASSESSMENT — ENCOUNTER SYMPTOMS
SHORTNESS OF BREATH: 0
DIARRHEA: 0
NAUSEA: 0
BLOOD IN STOOL: 0
ABDOMINAL DISTENTION: 0
ABDOMINAL PAIN: 0
ROS SKIN COMMENTS: ABRASION
VOMITING: 0
SORE THROAT: 0
SINUS PRESSURE: 0
COUGH: 0
EYE PAIN: 0
SINUS PAIN: 0
CONSTIPATION: 0

## 2020-05-17 ASSESSMENT — PAIN SCALES - GENERAL
PAINLEVEL_OUTOF10: 2
PAINLEVEL_OUTOF10: 5
PAINLEVEL_OUTOF10: 5

## 2020-05-17 ASSESSMENT — PAIN DESCRIPTION - LOCATION
LOCATION: HEAD
LOCATION: HEAD

## 2020-05-17 ASSESSMENT — PAIN DESCRIPTION - PAIN TYPE
TYPE: ACUTE PAIN
TYPE: ACUTE PAIN

## 2020-05-17 ASSESSMENT — PAIN DESCRIPTION - FREQUENCY: FREQUENCY: CONTINUOUS

## 2020-05-17 ASSESSMENT — PAIN DESCRIPTION - DESCRIPTORS
DESCRIPTORS: HEADACHE
DESCRIPTORS: SORE

## 2020-05-17 ASSESSMENT — PAIN DESCRIPTION - ORIENTATION: ORIENTATION: LEFT;UPPER

## 2020-05-17 NOTE — ED PROVIDER NOTES
[facility] COMPLAINT       Chief Complaint   Patient presents with    Seizures    Head Injury     left upper       Nurses Notes reviewed and I agree except as notedin the HPI. HISTORY OF PRESENT ILLNESS    Amalia Boas is a 76 y.o. male who presents after sustaining a fall this morning. He thinks that he was going to the bathroom to wash his hands and had a seizure. His last seizure was a month ago. He is on Keppra and to take his medication today. Does have abrasion to his aspect of his head. He states he did not have any chest pain, shortness of breath, palpitation, dizziness, nausea, vomiting, or diarrhea. He is not on blood thinners. He does not know the date of his last tetanus booster but he thinks of been over 4 years. Location/Symptom: Fall   Timing/Onset: this AM  Context/Setting: assisted living  Quality: none  Duration: none  Modifying Factors: none  Severity: none    REVIEW OF SYSTEMS     Review of Systems   Constitutional: Negative for chills and fever. HENT: Negative for congestion, ear pain, sinus pressure, sinus pain and sore throat. Eyes: Negative for pain. Respiratory: Negative for cough and shortness of breath. Cardiovascular: Negative for chest pain and leg swelling. Gastrointestinal: Negative for abdominal distention, abdominal pain, blood in stool, constipation, diarrhea, nausea and vomiting. Genitourinary: Negative for difficulty urinating, frequency and hematuria. Musculoskeletal: Negative for arthralgias. Skin: Negative for rash. Abrasion     Neurological: Positive for seizures and headaches. Negative for dizziness. All other systems reviewed and are negative. PAST MEDICAL HISTORY    has a past medical history of Anxiety disorder, Cancer ([facility] [address] COPD (chronic obstructive pulmonary disease) ([facility] [address] HLD (hyperlipidemia), and Seizure disorder ([facility] [address]     SURGICAL HISTORY      has a

## 2020-05-19 LAB — KEPPRA: 60 UG/ML (ref 12–46)

## 2020-06-11 ENCOUNTER — OUTSIDE SERVICES (OUTPATIENT)
Dept: FAMILY MEDICINE CLINIC | Age: 75
End: 2020-06-11
Payer: MEDICARE

## 2020-06-11 VITALS
SYSTOLIC BLOOD PRESSURE: 146 MMHG | DIASTOLIC BLOOD PRESSURE: 65 MMHG | TEMPERATURE: 98.5 F | RESPIRATION RATE: 18 BRPM | HEART RATE: 78 BPM | OXYGEN SATURATION: 95 % | BODY MASS INDEX: 27.88 KG/M2 | WEIGHT: 178 LBS

## 2020-06-11 PROCEDURE — 99490 CHRNC CARE MGMT STAFF 1ST 20: CPT | Performed by: NURSE PRACTITIONER

## 2020-06-11 ASSESSMENT — ENCOUNTER SYMPTOMS
COUGH: 0
ABDOMINAL PAIN: 0
COLOR CHANGE: 0
SORE THROAT: 0
RHINORRHEA: 0
SHORTNESS OF BREATH: 0
CONSTIPATION: 0
DIARRHEA: 0

## 2020-09-14 ENCOUNTER — HOSPITAL ENCOUNTER (EMERGENCY)
Age: 75
Discharge: HOME | End: 2020-09-14
Payer: MEDICARE

## 2020-09-14 ENCOUNTER — OUTSIDE SERVICES (OUTPATIENT)
Dept: FAMILY MEDICINE CLINIC | Age: 75
End: 2020-09-14
Payer: MEDICARE

## 2020-09-14 VITALS
WEIGHT: 179.1 LBS | DIASTOLIC BLOOD PRESSURE: 72 MMHG | OXYGEN SATURATION: 95 % | SYSTOLIC BLOOD PRESSURE: 150 MMHG | BODY MASS INDEX: 28.05 KG/M2 | RESPIRATION RATE: 16 BRPM | TEMPERATURE: 98.5 F | HEART RATE: 84 BPM

## 2020-09-14 VITALS
SYSTOLIC BLOOD PRESSURE: 148 MMHG | WEIGHT: 160 LBS | DIASTOLIC BLOOD PRESSURE: 84 MMHG | OXYGEN SATURATION: 96 % | HEART RATE: 84 BPM | HEIGHT: 67 IN | BODY MASS INDEX: 25.11 KG/M2 | RESPIRATION RATE: 18 BRPM | TEMPERATURE: 97.3 F

## 2020-09-14 PROCEDURE — 6370000000 HC RX 637 (ALT 250 FOR IP): Performed by: EMERGENCY MEDICINE

## 2020-09-14 PROCEDURE — 99490 CHRNC CARE MGMT STAFF 1ST 20: CPT | Performed by: NURSE PRACTITIONER

## 2020-09-14 PROCEDURE — 99282 EMERGENCY DEPT VISIT SF MDM: CPT

## 2020-09-14 PROCEDURE — 99283 EMERGENCY DEPT VISIT LOW MDM: CPT

## 2020-09-14 RX ORDER — CLINDAMYCIN HYDROCHLORIDE 300 MG/1
300 CAPSULE ORAL 3 TIMES DAILY
Qty: 30 CAPSULE | Refills: 0 | Status: SHIPPED | OUTPATIENT
Start: 2020-09-14 | End: 2020-09-24

## 2020-09-14 RX ORDER — LIDOCAINE HYDROCHLORIDE 20 MG/ML
5 SOLUTION OROPHARYNGEAL ONCE
Status: COMPLETED | OUTPATIENT
Start: 2020-09-14 | End: 2020-09-14

## 2020-09-14 RX ORDER — LIDOCAINE HYDROCHLORIDE 20 MG/ML
5 SOLUTION OROPHARYNGEAL
Qty: 100 ML | Refills: 0 | Status: SHIPPED | OUTPATIENT
Start: 2020-09-14

## 2020-09-14 RX ADMIN — Medication 5 ML: at 17:21

## 2020-09-14 ASSESSMENT — PAIN DESCRIPTION - PAIN TYPE: TYPE: ACUTE PAIN

## 2020-09-14 ASSESSMENT — ENCOUNTER SYMPTOMS
ABDOMINAL PAIN: 0
COUGH: 0
VOMITING: 0
EYE PAIN: 0
SINUS PRESSURE: 1
RHINORRHEA: 0
DIARRHEA: 0
CHOKING: 0
SHORTNESS OF BREATH: 0
EYE DISCHARGE: 0
FACIAL SWELLING: 0
NAUSEA: 1
SORE THROAT: 0
CONSTIPATION: 0

## 2020-09-14 ASSESSMENT — PAIN DESCRIPTION - DESCRIPTORS: DESCRIPTORS: ACHING

## 2020-09-14 ASSESSMENT — PAIN DESCRIPTION - FREQUENCY: FREQUENCY: CONTINUOUS

## 2020-09-14 ASSESSMENT — PAIN DESCRIPTION - LOCATION: LOCATION: TEETH

## 2020-09-14 ASSESSMENT — PAIN SCALES - GENERAL: PAINLEVEL_OUTOF10: 10

## 2020-09-14 ASSESSMENT — PAIN DESCRIPTION - ORIENTATION: ORIENTATION: RIGHT;UPPER

## 2020-09-14 NOTE — ED TRIAGE NOTES
Pt comes to the ED from [facility] for dental pain. Pt states he's been on ATB and pain meds for the past 4 days but hasn't improved. Pt is on the right upper side.

## 2020-09-14 NOTE — ED PROVIDER NOTES
[facility] COMPLAINT       Chief Complaint   Patient presents with    Dental Pain       Nurses Notes reviewed and I agree except as noted in the HPI. HISTORY OF PRESENT ILLNESS    Joby Dove is a 76 y.o. male who presents to the Emergency Department for the evaluation of dental pain. The patient has several fractured teeth and multiple dental caries. Patient resides at a extended care facility he began having dental pain and has upper incisors that have been chronically broken off. The facility physician placed the patient on doxycycline. Patient has had no improvement of symptoms over the past 3 days of being on the antibiotic. The nursing home staff are concerned and sent the patient to the ED    The patient is accompanied with family members. They advised the patient is acting himself. He is having dental pain that is not improving. The HPI was provided by the patient. REVIEW OF SYSTEMS     Review of Systems   Constitutional: Negative for fatigue and fever. HENT: Positive for dental problem. Negative for drooling. Respiratory: Negative for cough and shortness of breath. Musculoskeletal: Negative for back pain. Neurological: Negative for dizziness and light-headedness. Psychiatric/Behavioral: Negative for behavioral problems. PAST MEDICAL HISTORY    has a past medical history of Anxiety disorder, Cancer ([facility] [address] COPD (chronic obstructive pulmonary disease) ([facility] [address] HLD (hyperlipidemia), and Seizure disorder ([facility] [address]     SURGICAL HISTORY      has a past surgical history that includes Hemorrhoid surgery and Cataract removal.    CURRENT MEDICATIONS       Previous Medications    ACETAMINOPHEN (TYLENOL) 325 MG TABLET    Take 650 mg by mouth every 6 hours as needed for Pain    AMLODIPINE (NORVASC) 5 MG TABLET    Take 5 mg by mouth daily    CETIRIZINE (ZYRTEC) 10 MG TABLET    Take 10 mg by mouth daily    ESLICARBAZEPINE Mid-LevelProvider and was in agreement with being seen independently by myself.           Casimiro Ratliff, APRN - CNP  09/15/20 [id number]

## 2020-09-14 NOTE — PROGRESS NOTES
Jayjay Sneed is a 76 y.o. male who presents today for his medical conditions/complaints as noted below. Chief Complaint   Patient presents with    3 Month Follow-Up           HPI:     Chris is seen today for his quarterly chronic illness f/u. He has a PMX of seizures, anxiety, skin cancer, HTN, HLD, COPD, he quit smoking 2 years ago. He started to c/o tooth. Jaw pain last week,  The pain is in the front middle to the right side on the top of his jaw and teeth. He was started on Doxycycline last week Friday. Per nurse, he had redness and swelling on the right side of his face. That has improved, but Chris states the pain is worse. He has noted pain with palp of the right side of his face, none on the left with palp. He is dizzy and lightheaded, he has a headache, and nausea. Due to potential for further decline and the inability to see into his mouth, he was sent to the ER per squad. Staff called [facility] and they were in agreement. He states that he has not been able to eat due to not feeling well. He is pale, warm and dry. Pulses are palpable. No respiratory distress noted.       Past Medical History:   Diagnosis Date    Anxiety disorder     Cancer ([address]     Squamous Cell Cancer    COPD (chronic obstructive pulmonary disease) (HCC)     HLD (hyperlipidemia)     Seizure disorder (HCC)       Past Surgical History:   Procedure Laterality Date    CATARACT REMOVAL      HEMORRHOID SURGERY         Family History   Problem Relation Age of Onset    Hypertension Mother     Stroke Father        Social History     Tobacco Use    Smoking status: Former Smoker     Packs/day: 0.25     Years: 53.00     Pack years: 13.25     Types: Cigarettes     Last attempt to quit: 12/10/2018     Years since quittin.7    Smokeless tobacco: Never Used   Substance Use Topics    Alcohol use: No      No Known Allergies    Health Maintenance   Topic Date Due    AAA screen  1945    Hepatitis C screen  1945    Lipid screen  11/11/1955    Shingles Vaccine (1 of 2) 11/11/1995    Colon cancer screen colonoscopy  11/11/1995    Pneumococcal 65+ years Vaccine (1 of 1 - PPSV23) 11/11/2010    Annual Wellness Visit (AWV)  12/06/2019    Flu vaccine (1) 09/01/2020    Potassium monitoring  05/17/2021    Creatinine monitoring  05/17/2021    DTaP/Tdap/Td vaccine (2 - Td) 05/17/2030    Hepatitis A vaccine  Aged Out    Hepatitis B vaccine  Aged Out    Hib vaccine  Aged Out    Meningococcal (ACWY) vaccine  Aged Out       Subjective:      Review of Systems   Constitutional: Positive for activity change (does not feel good) and appetite change (not hungry, nauseated). Negative for fever. HENT: Positive for dental problem, mouth sores and sinus pressure. Negative for congestion, drooling, ear discharge, ear pain, facial swelling, hearing loss, postnasal drip, rhinorrhea, sneezing and sore throat. Eyes: Negative for pain and discharge. Respiratory: Negative for cough, choking and shortness of breath. Cardiovascular: Negative for chest pain, palpitations and leg swelling. Gastrointestinal: Positive for nausea. Negative for abdominal pain, constipation, diarrhea and vomiting. Endocrine: Negative for cold intolerance and heat intolerance. Genitourinary: Negative for difficulty urinating, frequency and urgency. Musculoskeletal: Positive for arthralgias. Negative for gait problem and myalgias. Skin: Negative for pallor. Neurological: Positive for dizziness, seizures, light-headedness, numbness and headaches. Negative for tremors and syncope. Hematological: Negative for adenopathy. Psychiatric/Behavioral: Positive for agitation. The patient is nervous/anxious. Objective:     Physical Exam  Vitals signs and nursing note reviewed. Constitutional:       General: He is not in acute distress. Appearance: He is normal weight. He is ill-appearing. He is not diaphoretic.    HENT:      Head: Normocephalic and atraumatic. Right Ear: External ear normal.      Left Ear: External ear normal.      Nose: Nose normal. No congestion or rhinorrhea. Mouth/Throat:      Pharynx: No oropharyngeal exudate. Eyes:      General: No scleral icterus. Right eye: No discharge. Left eye: No discharge. Conjunctiva/sclera: Conjunctivae normal.   Neck:      Musculoskeletal: Normal range of motion and neck supple. No neck rigidity or muscular tenderness. Cardiovascular:      Rate and Rhythm: Normal rate and regular rhythm. Pulses: Normal pulses. Heart sounds: Normal heart sounds. No murmur. Pulmonary:      Effort: Pulmonary effort is normal. No respiratory distress. Breath sounds: Normal breath sounds. No wheezing, rhonchi or rales. Abdominal:      General: Bowel sounds are normal. There is no distension. Palpations: Abdomen is soft. Tenderness: There is no abdominal tenderness. Musculoskeletal: Normal range of motion. General: No swelling or tenderness. Right lower leg: No edema. Left lower leg: No edema. Skin:     General: Skin is warm and dry. Coloration: Skin is pale. Skin is not jaundiced. Findings: No bruising. Neurological:      Mental Status: He is alert and oriented to person, place, and time. Mental status is at baseline. Psychiatric:         Mood and Affect: Mood normal.         Behavior: Behavior normal.       BP (!) 150/72   Pulse 84   Temp 98.5 °F (36.9 °C)   Resp 16   Wt 179 lb 1.6 oz (81.2 kg)   SpO2 95%   BMI 28.05 kg/m²     Assessment/Plan      1. Cellulitis of face   Doxycycline started - due to no improvement  To go to ER  F/u with Dentist as able   2. Seizure disorder (HCC)   Continue Keppra, Aptiom   Follow labs   3. Generalized anxiety disorder    4. Essential hypertension   Monitor  Continue Lisinopril, Norvasc   5. Hx of squamous cell carcinoma          Patient seen and examined.   History partially obtained by chart review and nursing notes. I have reviewed patient's past medical, surgical, social, and family history and have made updates where appropriate.   See facility EMR for updated medication list.       Electronically signed by ROBINA Knowles CNP on 9/14/2020 at 6:10 PM

## 2020-09-14 NOTE — ED NOTES
Bed: 034A  Expected date:   Expected time:   Means of arrival: [facility]  Comments:     Maia Makrs RN  09/14/20 [id number]

## 2020-09-15 ASSESSMENT — ENCOUNTER SYMPTOMS
SHORTNESS OF BREATH: 0
COUGH: 0
BACK PAIN: 0

## 2020-11-04 ENCOUNTER — NURSE ONLY (OUTPATIENT)
Dept: LAB | Age: 75
End: 2020-11-04

## 2020-11-09 ENCOUNTER — OUTSIDE SERVICES (OUTPATIENT)
Dept: FAMILY MEDICINE CLINIC | Age: 75
End: 2020-11-09
Payer: MEDICARE

## 2020-11-09 VITALS
RESPIRATION RATE: 18 BRPM | BODY MASS INDEX: 27.57 KG/M2 | TEMPERATURE: 98.5 F | HEART RATE: 84 BPM | OXYGEN SATURATION: 95 % | WEIGHT: 176 LBS | DIASTOLIC BLOOD PRESSURE: 64 MMHG | SYSTOLIC BLOOD PRESSURE: 160 MMHG

## 2020-11-09 PROCEDURE — 99310 SBSQ NF CARE HIGH MDM 45: CPT | Performed by: NURSE PRACTITIONER

## 2020-11-09 PROCEDURE — 99356 PR PROLONGED SVC I/P OR OBS SETTING 1ST HOUR: CPT | Performed by: NURSE PRACTITIONER

## 2020-11-09 ASSESSMENT — ENCOUNTER SYMPTOMS
ABDOMINAL PAIN: 0
CHEST TIGHTNESS: 0
SINUS PRESSURE: 0
VOMITING: 0
SHORTNESS OF BREATH: 0
COUGH: 0
RHINORRHEA: 0
EYE DISCHARGE: 0
COLOR CHANGE: 0
BACK PAIN: 1
TROUBLE SWALLOWING: 0
NAUSEA: 0
SORE THROAT: 0
EYE PAIN: 0

## 2020-11-10 NOTE — PROGRESS NOTES
[facility] 76 [facility] that presents for Back Pain      This visit was performed via synchronous telecommunication system. Patient is located in the SNF  I am located in my office    HPI:  Hany Hancock (Nahum Saykaryna) was seen today per request of nursing for pain that feels like needles to his feet, legs and shoulders that comes and goes over the past few weeks. He had a biopsy of his mid back on 11/3 and states that the recovery is not as easy as the last time he had this surgery. He did have an infection of the incision with the last surgery. His dressing is being changed daily per nursing, he is being monitored for s/s of infection. At this time the wound looks good per Fawn TERESA. Skip also states that at times when he is walking or standing his legs feels weak. Orders given. I also discussed the need for a walker at this time for safety. He was hesitant at first, but after a lengthy discussion he agreed to try on a temporary basis. He denies cough or shortness of breath. No distress noted. I have reviewed the patient's past medical history, past surgical history, allergies,medications, social and family history and I have made updates where appropriate.     Past Medical History:   Diagnosis Date    Anxiety disorder     Cancer ([facility] [address]     Squamous Cell Cancer    COPD (chronic obstructive pulmonary disease) (HCC)     HLD (hyperlipidemia)     Seizure disorder (HCC)        Past Surgical History:   Procedure Laterality Date    CATARACT REMOVAL      HEMORRHOID SURGERY         Social History     Tobacco Use    Smoking status: Former Smoker     Packs/day: 0.25     Years: 53.00     Pack years: 13.25     Types: Cigarettes     Last attempt to quit: 12/10/2018     Years since quittin.9    Smokeless tobacco: Never Used   Substance Use Topics    Alcohol use: No    Drug use: No       Family History   Problem Relation Age of Onset    Hypertension Mother     Stroke Father          Review of Systems Constitutional: Positive for activity change (due to Covid in building and weakness of legs). Negative for appetite change and fever. HENT: Negative for congestion, dental problem, ear pain, rhinorrhea, sinus pressure, sore throat and trouble swallowing. Eyes: Negative for pain and discharge. Respiratory: Negative for cough, chest tightness and shortness of breath. Cardiovascular: Negative for chest pain, palpitations and leg swelling. Gastrointestinal: Negative for abdominal pain, nausea and vomiting. Endocrine: Negative for cold intolerance. Genitourinary: Negative for difficulty urinating. Musculoskeletal: Positive for arthralgias, back pain and gait problem. Skin: Negative for color change. Allergic/Immunologic: Negative for food allergies. Neurological: Positive for weakness (of legs) and light-headedness (at times). Negative for dizziness, syncope and headaches. Hematological: Negative for adenopathy. Psychiatric/Behavioral: The patient is not nervous/anxious. PHYSICAL EXAM:  Vitals:    11/09/20 1935   BP: (!) 160/64   Pulse: 84   Resp: 18   Temp: 98.5 °F (36.9 °C)   SpO2: 95%        Physical Exam  Vitals signs and nursing note reviewed. Constitutional:       General: He is not in acute distress. Appearance: Normal appearance. He is normal weight. He is not diaphoretic. HENT:      Head: Normocephalic and atraumatic. Right Ear: External ear normal.      Left Ear: External ear normal.      Nose: Nose normal. No congestion or rhinorrhea. Eyes:      General: No scleral icterus. Right eye: No discharge. Left eye: No discharge. Conjunctiva/sclera: Conjunctivae normal.   Neck:      Musculoskeletal: Normal range of motion. No neck rigidity. Pulmonary:      Effort: Pulmonary effort is normal. No respiratory distress. Musculoskeletal: Normal range of motion. General: No swelling. Right lower leg: No edema.       Left lower leg: No edema. Skin:     Coloration: Skin is not jaundiced or pale. Neurological:      Mental Status: He is alert and oriented to person, place, and time. Mental status is at baseline. Psychiatric:         Mood and Affect: Mood normal.         Behavior: Behavior normal.         Thought Content: Thought content normal.          ASSESSMENT & PLAN  Iveth Cook was seen today for back pain. Diagnoses and all orders for this visit:    Chronic low back pain with bilateral sciatica, unspecified back pain laterality  Start Gabapentin 300 mg BID  Encourage the use of a walker for safety  Consider Therapy when able    Weakness of both legs  As above     These chronic conditions place resident at a significant risk of death, acute exacerbation, decline in function or functional decline. The patient's comprehensive plan was monitored today. I spent 20 minutes reviewing plan. I have seen and examined the patient virtually and chaperone was present. The history was obtained through the patient, past medical records, and the staff. The patient's chart was updated as appropriate. Please see facility EMR for complete medication reconciliation. Pursuant to the emergency declaration under the [address] [place] [address], [facility] waiver authority and the [employer] Act, this Virtual  Visit was conducted, with patient's consent, to reduce the patient's risk of exposure to COVID-19 and provide continuity of care for an established patient. Services were provided through a video synchronous discussion virtually to substitute for in-person SNF visit.   I spent >35 minutes in exam and discussion of plan of care    Electronically signed by ROBINA Agee CNP on 11/9/2020 at 7:46 PM

## 2021-01-04 ENCOUNTER — OUTSIDE SERVICES (OUTPATIENT)
Dept: FAMILY MEDICINE CLINIC | Age: 76
End: 2021-01-04
Payer: MEDICARE

## 2021-01-04 VITALS
BODY MASS INDEX: 27.57 KG/M2 | HEART RATE: 88 BPM | WEIGHT: 176 LBS | DIASTOLIC BLOOD PRESSURE: 60 MMHG | OXYGEN SATURATION: 96 % | RESPIRATION RATE: 20 BRPM | TEMPERATURE: 98 F | SYSTOLIC BLOOD PRESSURE: 159 MMHG

## 2021-01-04 DIAGNOSIS — R21 RASH IN ADULT: Primary | ICD-10-CM

## 2021-01-04 ASSESSMENT — ENCOUNTER SYMPTOMS
NAUSEA: 0
SHORTNESS OF BREATH: 0

## 2021-01-11 ENCOUNTER — OUTSIDE SERVICES (OUTPATIENT)
Dept: FAMILY MEDICINE CLINIC | Age: 76
End: 2021-01-11
Payer: MEDICARE

## 2021-01-11 VITALS
SYSTOLIC BLOOD PRESSURE: 160 MMHG | DIASTOLIC BLOOD PRESSURE: 74 MMHG | RESPIRATION RATE: 20 BRPM | HEART RATE: 88 BPM | BODY MASS INDEX: 27.57 KG/M2 | TEMPERATURE: 98.4 F | WEIGHT: 176 LBS | OXYGEN SATURATION: 96 %

## 2021-01-11 DIAGNOSIS — I10 ESSENTIAL HYPERTENSION: ICD-10-CM

## 2021-01-11 DIAGNOSIS — Z85.89 HX OF SQUAMOUS CELL CARCINOMA: ICD-10-CM

## 2021-01-11 DIAGNOSIS — M54.41 CHRONIC LOW BACK PAIN WITH BILATERAL SCIATICA, UNSPECIFIED BACK PAIN LATERALITY: ICD-10-CM

## 2021-01-11 DIAGNOSIS — G89.29 CHRONIC LOW BACK PAIN WITH BILATERAL SCIATICA, UNSPECIFIED BACK PAIN LATERALITY: ICD-10-CM

## 2021-01-11 DIAGNOSIS — R21 RASH IN ADULT: ICD-10-CM

## 2021-01-11 DIAGNOSIS — G40.909 SEIZURE DISORDER (HCC): ICD-10-CM

## 2021-01-11 DIAGNOSIS — U07.1 COVID-19: Primary | ICD-10-CM

## 2021-01-11 DIAGNOSIS — M54.42 CHRONIC LOW BACK PAIN WITH BILATERAL SCIATICA, UNSPECIFIED BACK PAIN LATERALITY: ICD-10-CM

## 2021-01-11 DIAGNOSIS — F41.1 GENERALIZED ANXIETY DISORDER: ICD-10-CM

## 2021-01-11 PROCEDURE — 99490 CHRNC CARE MGMT STAFF 1ST 20: CPT | Performed by: NURSE PRACTITIONER

## 2021-01-11 ASSESSMENT — ENCOUNTER SYMPTOMS
SHORTNESS OF BREATH: 0
RHINORRHEA: 0
SORE THROAT: 0
CONSTIPATION: 0
EYE REDNESS: 0
WHEEZING: 0
COUGH: 0
VOMITING: 0
NAUSEA: 0
DIARRHEA: 0

## 2021-01-11 NOTE — PROGRESS NOTES
Brian Villa is a 76 y.o. male that presents for Other (F2F for home health)      This visit was performed via synchronous telecommunication system. Patient is located in the SNF  I am located in my office    HPI:  Chris is seen today for face-to-face visit for home health therapy. He was noted to be Covid positive on 1/7/2021 with routine testing. He has symptoms of fatigue and did receive IV Bamlanivimab x 1 on 1/9/2021 without issue. He was seen last week on 1/4/2021 for rash on upper extremities and some on lower extremities. He does have chronic conditions of hypertension, anxiety, seizure disorder, and history of squamous cell carcinoma. He was initiated on Xcopri per neurology in November. He does take this nightly and states that the rash seems to bother him more often at night. Nursing is going to update neurology today. I did treat him last week with a Medrol Dosepak. And we have changed his soap. He does have what appears to be an abraded type rash on his upper extremities and a blister filled lesion on his chin this morning. Otherwise he states he is fever free, denies cough, no diarrhea, no shortness of breath, and no pain in the lower extremities. We will continue droplet isolation and at home health therapy/nursing. Physician to Follow Referral: ROBINA Tan - CNP    I certify that I, or a nurse practitioner or physician assistant working with me, had an in-person encounter with Brian Villa on 1/11/2021 and the reason for the home care services is documented in the clinical note for that day. Brian Villa was assessed on the above date and was found to have medical conditions requiring Home Care that included Covid 19, seizure disorder, htn.     Homebound Status: further, I certify that my clinical findings support that Brian Villa does meet the Medicare definition of \"Confined to the Home\" because of   Medically restricted to home because of Covid 19 Certification and medical necessity: I certify that, based on my findings, the following services are medically necessary home health services for Romeo Narayanan for the following reasons:  - Cardiopulmonary assessment and monitoring due to recent  Covid 19 Monitoring to include oxygen saturation by pulse oximeter on  room air only and  both at rest and during exertion Notify provider if any deterioration in cardiopulmonary status or oxygen saturation less than 90%. I have reviewed the patient's past medical history, past surgical history, allergies,medications, social and family history and I have made updates where appropriate. Past Medical History:   Diagnosis Date    Anxiety disorder     Cancer ([facility] [address]     Squamous Cell Cancer    COPD (chronic obstructive pulmonary disease) (HCC)     HLD (hyperlipidemia)     Seizure disorder (HCC)        Past Surgical History:   Procedure Laterality Date    CATARACT REMOVAL      HEMORRHOID SURGERY         Social History     Tobacco Use    Smoking status: Former Smoker     Packs/day: 0.25     Years: 53.00     Pack years: 13.25     Types: Cigarettes     Quit date: 12/10/2018     Years since quittin.0    Smokeless tobacco: Never Used   Substance Use Topics    Alcohol use: No    Drug use: No       Family History   Problem Relation Age of Onset    Hypertension Mother     Stroke Father          Review of Systems   Constitutional: Positive for fatigue. Negative for chills, fever and unexpected weight change. HENT: Positive for hearing loss. Negative for congestion, rhinorrhea and sore throat. Eyes: Negative for redness and visual disturbance. Respiratory: Negative for cough, shortness of breath and wheezing. Cardiovascular: Negative for chest pain and leg swelling. Gastrointestinal: Negative for constipation, diarrhea, nausea and vomiting. Endocrine: Negative for polydipsia and polyuria.    Genitourinary: Negative for dysuria, frequency and hematuria. Musculoskeletal: Negative for arthralgias, gait problem and myalgias. Skin: Positive for rash. Negative for wound. Allergic/Immunologic: Negative for environmental allergies and immunocompromised state. Neurological: Positive for weakness. Negative for dizziness, light-headedness and headaches. Hematological: Does not bruise/bleed easily. Psychiatric/Behavioral: Negative for dysphoric mood and sleep disturbance. The patient is nervous/anxious. PHYSICAL EXAM:  Vitals:    01/11/21 1045   BP: (!) 160/74   Pulse: 88   Resp: 20   Temp: 98.4 °F (36.9 °C)   SpO2: 96%        Physical Exam  Constitutional:       General: He is not in acute distress. Appearance: Normal appearance. He is normal weight. He is not ill-appearing or diaphoretic. HENT:      Head: Normocephalic and atraumatic. Right Ear: Hearing normal.      Left Ear: Hearing normal.      Nose: Nose normal. No rhinorrhea. Mouth/Throat:      Lips: Pink. No lesions. Eyes:      General: Lids are normal. No scleral icterus. Neck:      Musculoskeletal: Normal range of motion and neck supple. Pulmonary:      Effort: Pulmonary effort is normal. No accessory muscle usage, respiratory distress or retractions. Abdominal:      General: Abdomen is flat. There is no distension. Musculoskeletal:      Right lower leg: No edema. Left lower leg: No edema. Skin:     Coloration: Skin is not pale. Findings: Erythema and rash present. No bruising. Rash is vesicular. Neurological:      General: No focal deficit present. Mental Status: He is alert and oriented to person, place, and time. Psychiatric:         Attention and Perception: Attention normal.         Mood and Affect: Mood normal.         Speech: Speech normal.         Behavior: Behavior normal.         Cognition and Memory: Cognition and memory normal.          ASSESSMENT & PLAN  Faizan Bang was seen today for other.     Diagnoses and all orders for this visit:    COVID-19  Home health to eval and treat. Monitor oxygen, lung sounds. Follow up as directed. Chronic low back pain with bilateral sciatica, unspecified back pain laterality  Continue tylenol as needed. Seizure disorder ([address]  Continue current meds. Update neurology regarding rash. May be related. Generalized anxiety disorder  Chronic and continue Remeron. Essential hypertension  Blood pressures overall stable. Continue Norvasc and Lisinopril  Hx of squamous cell carcinoma  Follow up with derm as needed. Rash-  Continue current treatment. Will add CBC. No follow-ups on file. Resident has HTN, seizure disorder, HLD and it is expected to last 12 or more months. These chronic conditions place resident at a significant risk of death, acute exacerbation, decline in function or functional decline. The patient's comprehensive plan was monitored today. I spent 20 minutes reviewing plan. I have seen and examined the patient virtually and chaperone was present. The history was obtained through the patient, past medical records, and the staff. The patient's chart was updated as appropriate. Please see facility EMR for complete medication reconciliation. Pursuant to the emergency declaration under the [facility] waiver authority and the [employer] Act, this Virtual  Visit was conducted, with patient's consent, to reduce the patient's risk of exposure to COVID-19 and provide continuity of care for an established patient. Services were provided through a video synchronous discussion virtually to substitute for in-person SNF visit.     Electronically signed by ROBINA Braun CNP on 1/11/2021 at 10:47 AM

## 2021-02-22 ENCOUNTER — OUTSIDE SERVICES (OUTPATIENT)
Dept: FAMILY MEDICINE CLINIC | Age: 76
End: 2021-02-22
Payer: MEDICARE

## 2021-02-22 VITALS
OXYGEN SATURATION: 96 % | SYSTOLIC BLOOD PRESSURE: 118 MMHG | HEART RATE: 98 BPM | BODY MASS INDEX: 29.41 KG/M2 | RESPIRATION RATE: 20 BRPM | WEIGHT: 187.8 LBS | TEMPERATURE: 97.8 F | DIASTOLIC BLOOD PRESSURE: 80 MMHG

## 2021-02-22 DIAGNOSIS — R21 RASH IN ADULT: ICD-10-CM

## 2021-02-22 DIAGNOSIS — F41.1 GENERALIZED ANXIETY DISORDER: Primary | ICD-10-CM

## 2021-02-22 NOTE — PROGRESS NOTES
Melinda Hwang is a 76 y.o. male who presents today for his medical conditions/complaints as noted below. Chief Complaint   Patient presents with    Skin Problem    Anxiety           HPI:     Chris is seen today as a result of a dermatology follow-up for issues on his bilateral upper arms. He was seen by dermatology the end of last week for lesions on his lateral upper extremities. Dermatologist had asked for staff to send a picture of his arms to her. Dermatologist is very concerned as am I that patient is self inflicting harm or causing this to him. Visited with patient today regarding areas on bilateral upper extremities. Patient discussed how these started and does not state that he caused these. He states that they started as numbness/tingling/initial lesions on his arms and hands. He also states that he had areas on his lower extremities back face which have now subsided. Patient did have COVID-19 and he did have a Covid rash following. He states that he did get rid of the Covid rash with a concoction that he prepared in his room which was baking soda, honey, water and put this on his rash. He also showed this provider Silvadene that he is also putting on his rash. Reviewed with patient any anxiety issues which patient has a long history of anxiety and is currently on Remeron at bedtime. Patient was on admission on Ativan but no longer feels he needs this. Patient does explain to this provider that he is anxious, agitated that he remains isolated from the outside. He feels that he would benefit from talking to psychologist or psychiatry about his anxiety/depression. He would love to go out and shop, visit with family, visit with friends. He feels that restrictions have caused increased anxiety. Patient does work on crafts in room and areas on arms do appear linear in nature and appear to be burns from some source.   Discussed treatment and he is open to both psychological treatment and Normocephalic and atraumatic. Nose: No congestion. Neck:      Musculoskeletal: Normal range of motion. Pulmonary:      Effort: No respiratory distress. Abdominal:      General: There is no distension. Skin:     Findings: Erythema, signs of injury and wound present. No rash. Neurological:      Mental Status: He is alert. Psychiatric:         Mood and Affect: Mood is anxious. Affect is angry. Behavior: Behavior is hyperactive. Thought Content: Thought content is paranoid. Thought content does not include suicidal ideation. Thought content does not include homicidal or suicidal plan. Cognition and Memory: Memory normal.         Judgment: Judgment is inappropriate. Assessment/Plan      1. Generalized anxiety disorder - Continue Remeron. Refer to psychology or psychiatry services. 2. Rash in adult - linear injury to bilateral arms. Added treatment and encouraged no personal treatment. Continue to follow.         Electronically signed by ROBINA Billings CNP on 2/22/2021 at 5:31 PM

## 2021-02-26 ENCOUNTER — OUTSIDE SERVICES (OUTPATIENT)
Dept: FAMILY MEDICINE CLINIC | Age: 76
End: 2021-02-26
Payer: MEDICARE

## 2021-02-26 VITALS
TEMPERATURE: 98 F | RESPIRATION RATE: 20 BRPM | DIASTOLIC BLOOD PRESSURE: 78 MMHG | BODY MASS INDEX: 29.41 KG/M2 | HEART RATE: 98 BPM | WEIGHT: 187.8 LBS | SYSTOLIC BLOOD PRESSURE: 124 MMHG | OXYGEN SATURATION: 96 %

## 2021-02-26 DIAGNOSIS — R21 RASH IN ADULT: ICD-10-CM

## 2021-02-26 DIAGNOSIS — F41.1 GENERALIZED ANXIETY DISORDER: Primary | ICD-10-CM

## 2021-02-26 ASSESSMENT — ENCOUNTER SYMPTOMS
COUGH: 0
SHORTNESS OF BREATH: 0

## 2021-02-26 NOTE — PROGRESS NOTES
Fiorella Cain is a 76 y.o. male who presents today for his medical conditions/complaints as noted below. Chief Complaint   Patient presents with    Skin Problem           HPI:     Chris is seen today as a result of a dermatology follow-up for issues on his bilateral upper arms. He has refused the treatment to his upper arms. He stated to the nurse that he did not feel he needed this and wanted to see this provider. He was seen by dermatology the end of last week for lesions on his lateral upper extremities. Dermatologist had asked for staff to send a picture of his arms to her. Dermatologist is very concerned as am I that patient is self inflicting harm or causing this to him. Visited with patient today regarding areas on bilateral upper extremities. Patient arms are improving but remains red on the right side and remains scabbed. Stressed the need for dressings and to leave watch and rings off. Wanting to know when his appointment is next week and with whom. Instructed nursing staff to discuss this with the patient. Patient is more anxious today but is agreeable to continuing dressings to bilateral arms.          Current Outpatient Medications   Medication Sig Dispense Refill    lidocaine viscous hcl (XYLOCAINE) 2 % SOLN solution Take 5 mLs by mouth every 3 hours as needed for Dental Pain 100 mL 0    loperamide (IMODIUM) 2 MG capsule Take 2 mg by mouth 4 times daily as needed for Diarrhea      Eslicarbazepine Acetate (APTIOM) 400 MG TABS Take 1,000 mg by mouth daily       amLODIPine (NORVASC) 5 MG tablet Take 5 mg by mouth daily      lisinopril (PRINIVIL;ZESTRIL) 10 MG tablet Take 10 mg by mouth daily      acetaminophen (TYLENOL) 325 MG tablet Take 650 mg by mouth every 6 hours as needed for Pain      mirtazapine (REMERON) 15 MG tablet Take 15 mg by mouth nightly      pravastatin (PRAVACHOL) 40 MG tablet Take 40 mg by mouth daily      Hypromellose (GENTEAL MILD TO MODERATE) 0.3 % SOLN Apply 1 drop to eye daily      levETIRAcetam (KEPPRA) 500 MG tablet Take 1,500 mg by mouth 2 times daily       cetirizine (ZYRTEC) 10 MG tablet Take 10 mg by mouth daily      Multiple Vitamins-Minerals (CENTRUM SILVER PO) Take 1 tablet by mouth daily. No current facility-administered medications for this visit. No Known Allergies    Subjective:      Review of Systems   Constitutional: Negative for unexpected weight change. Eyes: Positive for visual disturbance. Respiratory: Negative for cough and shortness of breath. Musculoskeletal: Positive for arthralgias. Negative for gait problem. Skin: Positive for wound. Neurological: Negative for weakness. Psychiatric/Behavioral: Positive for behavioral problems. The patient is nervous/anxious. Objective:     /78   Pulse 98   Temp 98 °F (36.7 °C)   Resp 20   Wt 187 lb 12.8 oz (85.2 kg)   SpO2 96%   BMI 29.41 kg/m²     Physical Exam  HENT:      Head: Normocephalic. Nose: No congestion. Neck:      Musculoskeletal: Normal range of motion. Pulmonary:      Effort: Pulmonary effort is normal. No respiratory distress. Musculoskeletal:      Right lower leg: No edema. Left lower leg: No edema. Skin:     Findings: Burn (bilateral arms), erythema and signs of injury present. Neurological:      Mental Status: He is alert. Assessment/Plan      1. Generalized anxiety disorder    2. Rash in adult      Continue current treatment and offer support. Monitor anxiety/depressive symptoms.     Electronically signed by ROBINA Erazo CNP on 2/26/2021 at 11:03 AM

## 2021-04-25 ASSESSMENT — ENCOUNTER SYMPTOMS
RHINORRHEA: 0
SHORTNESS OF BREATH: 0
COUGH: 0
WHEEZING: 0
VOMITING: 0
SORE THROAT: 0
EYE REDNESS: 0
DIARRHEA: 0
NAUSEA: 0
CONSTIPATION: 0

## 2021-04-25 NOTE — PROGRESS NOTES
Miguel Angel Latif is a 76 y.o. male who presents today for his medical conditions/complaints as noted below. Chief Complaint   Patient presents with    3 Month Follow-Up           HPI:     Chris is seen in his room. He has chronic health conditions of seizures, anxiety, skin cancer, HTN, HLD, COPD. He has abrasions of his bilateral arms. He states he is unsure how this occurs. He is anxious and states it starts to burn and then he does rub the area. He does not know if he scratches it. Also discussed smoking and he is concerned about using any products because of side effects. He does have a follow up with [facility] today. No recent labs. No recent falls. Weight is stable at 189 pounds.        Past Medical History:   Diagnosis Date    Anxiety disorder     Cancer ([address]     Squamous Cell Cancer    COPD (chronic obstructive pulmonary disease) (HCC)     HLD (hyperlipidemia)     Seizure disorder (HCC)       Past Surgical History:   Procedure Laterality Date    CATARACT REMOVAL      HEMORRHOID SURGERY         Family History   Problem Relation Age of Onset    Hypertension Mother     Stroke Father        Social History     Tobacco Use    Smoking status: Former Smoker     Packs/day: 0.25     Years: 53.00     Pack years: 13.25     Types: Cigarettes     Quit date: 12/10/2018     Years since quittin.3    Smokeless tobacco: Never Used   Substance Use Topics    Alcohol use: No      No Known Allergies    Health Maintenance   Topic Date Due    AAA screen  Never done    Hepatitis C screen  Never done    Lipid screen  Never done    COVID-19 Vaccine (1) Never done    Shingles Vaccine (1 of 2) Never done    Colon cancer screen colonoscopy  Never done    Annual Wellness Visit (AWV)  Never done    Potassium monitoring  2021    Creatinine monitoring  2021    Flu vaccine (Season Ended) 2021    DTaP/Tdap/Td vaccine (2 - Td) 2030    Pneumococcal 65+ years Vaccine  Completed    Hepatitis A vaccine  Aged Out    Hepatitis B vaccine  Aged Out    Hib vaccine  Aged Out    Meningococcal (ACWY) vaccine  Aged Out       Subjective:      Review of Systems   Constitutional: Negative for chills, fatigue and fever. HENT: Negative for congestion, rhinorrhea and sore throat. Eyes: Negative for redness and visual disturbance. Respiratory: Negative for cough, shortness of breath and wheezing. Cardiovascular: Negative for chest pain and leg swelling. Gastrointestinal: Negative for constipation, diarrhea, nausea and vomiting. Endocrine: Negative for polydipsia and polyuria. Genitourinary: Negative for dysuria, frequency and hematuria. Musculoskeletal: Negative for arthralgias, gait problem and myalgias. Skin: Positive for rash (abrasions on arms). Negative for wound. Allergic/Immunologic: Negative for environmental allergies and immunocompromised state. Neurological: Negative for dizziness, light-headedness and headaches. Hematological: Does not bruise/bleed easily. Psychiatric/Behavioral: Negative for dysphoric mood and sleep disturbance. The patient is nervous/anxious. Objective:     Physical Exam  Vitals signs and nursing note reviewed. Constitutional:       General: He is not in acute distress. Appearance: He is well-developed. HENT:      Head: Normocephalic and atraumatic. Right Ear: External ear normal.      Left Ear: External ear normal.      Nose: Nose normal.   Eyes:      General: No scleral icterus. Right eye: No discharge. Left eye: No discharge. Conjunctiva/sclera: Conjunctivae normal.   Neck:      Musculoskeletal: Neck supple. Thyroid: No thyromegaly. Vascular: No JVD. Cardiovascular:      Rate and Rhythm: Normal rate and regular rhythm. Heart sounds: Normal heart sounds. Pulmonary:      Effort: Pulmonary effort is normal. No respiratory distress. Breath sounds: Normal breath sounds. No stridor.  No wheezing or rales.   Abdominal:      General: Bowel sounds are normal. There is no distension. Palpations: Abdomen is soft. Tenderness: There is no abdominal tenderness. Musculoskeletal: Normal range of motion. General: No deformity. Right shoulder: He exhibits no tenderness, no bony tenderness and no pain. Left shoulder: He exhibits no tenderness, no bony tenderness and no pain. Cervical back: He exhibits no tenderness, no bony tenderness and no pain. Thoracic back: He exhibits no tenderness, no bony tenderness, no pain and no spasm. Lumbar back: He exhibits no tenderness, no bony tenderness and no pain. Lymphadenopathy:      Cervical: No cervical adenopathy. Upper Body:      Right upper body: No supraclavicular adenopathy. Left upper body: No supraclavicular adenopathy. Skin:     General: Skin is warm and dry. Findings: Abrasion, erythema and signs of injury present. No rash. Neurological:      Mental Status: He is alert and oriented to person, place, and time. Motor: No atrophy, abnormal muscle tone or seizure activity. Psychiatric:         Mood and Affect: Mood is anxious. Speech: Speech normal.         Behavior: Behavior normal.         Thought Content: Thought content is paranoid. BP (!) 140/80   Pulse 96   Temp 97.6 °F (36.4 °C)   Resp 18   Wt 189 lb (85.7 kg)   SpO2 96%   BMI 29.60 kg/m²     Assessment/Plan      1. Seizure disorder ([address] -follows with neurology. Continue current seizure medications. 2. Essential hypertension -continue Norvasc and lisinopril. 3. Hx of squamous cell carcinoma -skin is not related to squamous cell cancer. Looks as if patient has rubbed areas. Patient is very anxious. Continue to monitor skin we will add Keflex. 4. Generalized anxiety disorder -follow-up with [facility]. We will add Nicotrol once patient has reviewed side effects.    5. Chronic low back pain with bilateral sciatica, unspecified back pain laterality -continue gabapentin and Tylenol. 6. Other constipation -continue to monitor bowels. Resident has HTN, Anxiety, OA, Seizure disorder and it is expected to last 12 or more months. These chronic conditions place resident at a significant risk of death, acute exacerbation, or functional decline. The patient's comprehensive plan was monitored today. I spent 20 minutes reviewing plan. Patient seen and examined. History partially obtained by chart review and nursing notes. I have reviewed patient's past medical, surgical, social, and family history and have made updates where appropriate.   See facility EMR for updated medication list.       Electronically signed by ROBINA Ayala CNP on 4/26/2021 at 2:03 PM

## 2021-04-26 ENCOUNTER — OUTSIDE SERVICES (OUTPATIENT)
Dept: FAMILY MEDICINE CLINIC | Age: 76
End: 2021-04-26
Payer: MEDICARE

## 2021-04-26 VITALS
WEIGHT: 189 LBS | OXYGEN SATURATION: 96 % | RESPIRATION RATE: 18 BRPM | SYSTOLIC BLOOD PRESSURE: 140 MMHG | BODY MASS INDEX: 29.6 KG/M2 | DIASTOLIC BLOOD PRESSURE: 80 MMHG | HEART RATE: 96 BPM | TEMPERATURE: 97.6 F

## 2021-04-26 DIAGNOSIS — I10 ESSENTIAL HYPERTENSION: ICD-10-CM

## 2021-04-26 DIAGNOSIS — G89.29 CHRONIC LOW BACK PAIN WITH BILATERAL SCIATICA, UNSPECIFIED BACK PAIN LATERALITY: ICD-10-CM

## 2021-04-26 DIAGNOSIS — K59.09 OTHER CONSTIPATION: ICD-10-CM

## 2021-04-26 DIAGNOSIS — M54.41 CHRONIC LOW BACK PAIN WITH BILATERAL SCIATICA, UNSPECIFIED BACK PAIN LATERALITY: ICD-10-CM

## 2021-04-26 DIAGNOSIS — Z85.89 HX OF SQUAMOUS CELL CARCINOMA: ICD-10-CM

## 2021-04-26 DIAGNOSIS — F41.1 GENERALIZED ANXIETY DISORDER: ICD-10-CM

## 2021-04-26 DIAGNOSIS — M54.42 CHRONIC LOW BACK PAIN WITH BILATERAL SCIATICA, UNSPECIFIED BACK PAIN LATERALITY: ICD-10-CM

## 2021-04-26 DIAGNOSIS — G40.909 SEIZURE DISORDER (HCC): Primary | ICD-10-CM

## 2021-04-26 PROCEDURE — 99490 CHRNC CARE MGMT STAFF 1ST 20: CPT | Performed by: NURSE PRACTITIONER

## 2021-07-12 ENCOUNTER — OUTSIDE SERVICES (OUTPATIENT)
Dept: FAMILY MEDICINE CLINIC | Age: 76
End: 2021-07-12
Payer: MEDICARE

## 2021-07-12 VITALS
BODY MASS INDEX: 29.77 KG/M2 | HEART RATE: 72 BPM | RESPIRATION RATE: 18 BRPM | WEIGHT: 190.1 LBS | SYSTOLIC BLOOD PRESSURE: 128 MMHG | TEMPERATURE: 97.6 F | OXYGEN SATURATION: 94 % | DIASTOLIC BLOOD PRESSURE: 75 MMHG

## 2021-07-12 DIAGNOSIS — J02.0 ACUTE STREPTOCOCCAL PHARYNGITIS: Primary | ICD-10-CM

## 2021-07-12 NOTE — PROGRESS NOTES
Kayla Lal is a 76 y.o. male who presents today for his medical conditions/complaints as noted below. Chief Complaint   Patient presents with    Other     sore throat           HPI:     Subjective:     History was provided by the patient. Kayla Lal is a 76 y.o. male who presents for evaluation of a sore throat. Associated symptoms include coryza, dry cough, enlarged tonsils, hoarseness, pain while swallowing, post nasal drip, sore throat, wheezing and white spots in throat. Onset of symptoms was 7 day ago, gradually improving since that time. He is drinking plenty of fluids. He has not had recent close exposure to someone with proven streptococcal pharyngitis. Past Medical History:  No date: Anxiety disorder  No date: Cancer [facility])      Comment:  Squamous Cell Cancer  No date: COPD (chronic obstructive pulmonary disease) (HCC)  No date: HLD (hyperlipidemia)  No date: Seizure disorder ([address]    Review of Systems  A comprehensive review of systems was negative except for: Ears, nose, mouth, throat, and face: positive for hoarseness, nasal congestion, sore mouth and sore throat  Respiratory: positive for cough, dyspnea on exertion and wheezing     Objective:    /75   Pulse 72   Temp 97.6 °F (36.4 °C)   Resp 18   Wt 190 lb 1.6 oz (86.2 kg)   SpO2 94%   BMI 29.77 kg/m²   General appearance: alert, appears stated age, cooperative and no distress  Head: Normocephalic, without obvious abnormality, atraumatic, sinuses nontender to percussion  Eyes: conjunctivae/corneas clear. PERRL, EOM's intact. Fundi benign. Ears: normal TM's and external ear canals both ears  Nose: Nares normal. Septum midline. Mucosa normal. No drainage or sinus tenderness.   Throat: abnormal findings: tonsillar hypertrophy 2+  Neck: no adenopathy, no carotid bruit, no JVD, supple, symmetrical, trachea midline and thyroid not enlarged, symmetric, no tenderness/mass/nodules  Lungs: clear to auscultation bilaterally and wheezes posterior - bilateral  Heart: regular rate and rhythm, S1, S2 normal, no murmur, click, rub or gallop  Extremities: extremities normal, atraumatic, no cyanosis or edema  Skin: Skin color, texture, turgor normal. No rashes or lesions     Assessment:     Bacterial tonsillitis R/O will continue current antibiotic and continue x 10 days. Will add Albuterol. Plan:    Pt placed on antibiotics and albuterol breathing HFA  Use of OTC analgesics recommended as well as salt water gargles. Follow up as needed. Current Outpatient Medications   Medication Sig Dispense Refill    lidocaine viscous hcl (XYLOCAINE) 2 % SOLN solution Take 5 mLs by mouth every 3 hours as needed for Dental Pain 100 mL 0    loperamide (IMODIUM) 2 MG capsule Take 2 mg by mouth 4 times daily as needed for Diarrhea      Eslicarbazepine Acetate (APTIOM) 400 MG TABS Take 1,000 mg by mouth daily       amLODIPine (NORVASC) 5 MG tablet Take 5 mg by mouth daily      lisinopril (PRINIVIL;ZESTRIL) 10 MG tablet Take 10 mg by mouth daily      acetaminophen (TYLENOL) 325 MG tablet Take 650 mg by mouth every 6 hours as needed for Pain      mirtazapine (REMERON) 15 MG tablet Take 15 mg by mouth nightly      pravastatin (PRAVACHOL) 40 MG tablet Take 40 mg by mouth daily      Hypromellose (GENTEAL MILD TO MODERATE) 0.3 % SOLN Apply 1 drop to eye daily      levETIRAcetam (KEPPRA) 500 MG tablet Take 1,500 mg by mouth 2 times daily       cetirizine (ZYRTEC) 10 MG tablet Take 10 mg by mouth daily      Multiple Vitamins-Minerals (CENTRUM SILVER PO) Take 1 tablet by mouth daily. No current facility-administered medications for this visit. No Known Allergies    Subjective:      Review of Systems    Objective:     /75   Pulse 72   Temp 97.6 °F (36.4 °C)   Resp 18   Wt 190 lb 1.6 oz (86.2 kg)   SpO2 94%   BMI 29.77 kg/m²           Assessment/Plan      1.  Acute streptococcal pharyngitis          Electronically signed by Marino Bean

## 2021-09-24 ENCOUNTER — OUTSIDE SERVICES (OUTPATIENT)
Dept: FAMILY MEDICINE CLINIC | Age: 76
End: 2021-09-24
Payer: MEDICARE

## 2021-09-24 VITALS
BODY MASS INDEX: 29.6 KG/M2 | HEART RATE: 72 BPM | TEMPERATURE: 98.1 F | DIASTOLIC BLOOD PRESSURE: 78 MMHG | OXYGEN SATURATION: 94 % | WEIGHT: 189 LBS | RESPIRATION RATE: 18 BRPM | SYSTOLIC BLOOD PRESSURE: 140 MMHG

## 2021-09-24 DIAGNOSIS — Z00.00 ENCOUNTER FOR MEDICARE ANNUAL WELLNESS EXAM: Primary | ICD-10-CM

## 2021-09-24 DIAGNOSIS — Z00.8 ENCOUNTER FOR OTHER GENERAL EXAMINATION: ICD-10-CM

## 2021-09-24 PROCEDURE — G0439 PPPS, SUBSEQ VISIT: HCPCS | Performed by: FAMILY MEDICINE

## 2021-09-24 ASSESSMENT — PATIENT HEALTH QUESTIONNAIRE - PHQ9
SUM OF ALL RESPONSES TO PHQ QUESTIONS 1-9: 0
SUM OF ALL RESPONSES TO PHQ QUESTIONS 1-9: 0
SUM OF ALL RESPONSES TO PHQ9 QUESTIONS 1 & 2: 0
SUM OF ALL RESPONSES TO PHQ QUESTIONS 1-9: 0
2. FEELING DOWN, DEPRESSED OR HOPELESS: 0
1. LITTLE INTEREST OR PLEASURE IN DOING THINGS: 0

## 2021-09-24 ASSESSMENT — LIFESTYLE VARIABLES: HOW OFTEN DO YOU HAVE A DRINK CONTAINING ALCOHOL: 0

## 2021-09-24 NOTE — PROGRESS NOTES
Medicare Annual Wellness Visit    Name: Andria Anton Date: 2021   MRN: [id number] Sex: Male   Age: 76 y.o. Ethnicity: Non- / Non    : 1945 Race: White (non-)      Gen Client is here for [employer]    He has been getting diarrhea from the food here so is going to start making more of his own food. He stays busy working on intricate woodworking. Screenings for behavioral, psychosocial and functional/safety risks, and cognitive dysfunction are all negative except as indicated below. These results, as well as other patient data from the [address] form, are documented in Flowsheets linked to this Encounter. No Known Allergies    Prior to Visit Medications    Medication Sig Taking?  Authorizing Provider   lidocaine viscous hcl (XYLOCAINE) 2 % SOLN solution Take 5 mLs by mouth every 3 hours as needed for Dental Pain  Galina Room, APRN - CNP   loperamide (IMODIUM) 2 MG capsule Take 2 mg by mouth 4 times daily as needed for Diarrhea  Historical Provider, MD   Eslicarbazepine Acetate (APTIOM) 400 MG TABS Take 1,000 mg by mouth daily   Historical Provider, MD   amLODIPine (NORVASC) 5 MG tablet Take 5 mg by mouth daily  Historical Provider, MD   lisinopril (PRINIVIL;ZESTRIL) 10 MG tablet Take 10 mg by mouth daily  Historical Provider, MD   acetaminophen (TYLENOL) 325 MG tablet Take 650 mg by mouth every 6 hours as needed for Pain  Historical Provider, MD   mirtazapine (REMERON) 15 MG tablet Take 15 mg by mouth nightly  Historical Provider, MD   pravastatin (PRAVACHOL) 40 MG tablet Take 40 mg by mouth daily  Historical Provider, MD   Hypromellose (GENTEAL MILD TO MODERATE) 0.3 % SOLN Apply 1 drop to eye daily  Historical Provider, MD   levETIRAcetam (KEPPRA) 500 MG tablet Take 1,500 mg by mouth 2 times daily   Historical Provider, MD   cetirizine (ZYRTEC) 10 MG tablet Take 10 mg by mouth daily  Historical Provider, MD   Multiple Vitamins-Minerals (CENTRUM SILVER PO) Take 1 tablet by mouth daily. Historical Provider, MD       Past Medical History:   Diagnosis Date    Anxiety disorder     Cancer ([address]     Squamous Cell Cancer    COPD (chronic obstructive pulmonary disease) ([address]     HLD (hyperlipidemia)     Seizure disorder (HCC)        Past Surgical History:   Procedure Laterality Date    CATARACT REMOVAL      HEMORRHOID SURGERY         Family History   Problem Relation Age of Onset    Hypertension Mother     Stroke Father        CareTeam (Including outside providers/suppliers regularly involved in providing care):   Patient Care Team:  Lianne Leos MD as PCP - General (Family Medicine)    Wt Readings from Last 3 Encounters:   09/24/21 189 lb (85.7 kg)   07/12/21 190 lb 1.6 oz (86.2 kg)   04/26/21 189 lb (85.7 kg)     Vitals:    09/24/21 0837   BP: (!) 140/78   Pulse: 72   Resp: 18   Temp: 98.1 °F (36.7 °C)   SpO2: 94%   Weight: 189 lb (85.7 kg)     Body mass index is 29.6 kg/m². Based upon direct observation of the patient, evaluation of cognition reveals recent and remote memory intact.     General Appearance: alert and oriented to person, place and time, well developed and well- nourished, in no acute distress  Skin: warm and dry, no rash or erythema  Head: normocephalic and atraumatic  Eyes: pupils equal, round, and reactive to light, extraocular eye movements intact, conjunctivae normal  ENT: tympanic membrane, external ear and ear canal normal bilaterally, nose without deformity, nasal mucosa and turbinates normal without polyps  Neck: supple and non-tender without mass, no thyromegaly or thyroid nodules, no cervical lymphadenopathy  Pulmonary/Chest: clear to auscultation bilaterally- no wheezes, rales or rhonchi, normal air movement, no respiratory distress  Cardiovascular: normal rate, regular rhythm, normal S1 and S2, no murmurs, rubs, clicks, or gallops, distal pulses intact, no carotid bruits  Abdomen: soft, non-tender, in written form: see Patient Donnie Arce was seen today for medicare awv. Diagnoses and all orders for this visit:    Encounter for Medicare annual wellness exam        -     Routine health maintenance screening was reviewed as above. Electronically signed by Bill Bailey MD on 9/24/21.

## 2021-09-24 NOTE — PATIENT INSTRUCTIONS
Personalized Preventive Plan for Chelo Henson - 9/24/2021  Medicare offers a range of preventive health benefits. Some of the tests and screenings are paid in full while other may be subject to a deductible, co-insurance, and/or copay. Some of these benefits include a comprehensive review of your medical history including lifestyle, illnesses that may run in your family, and various assessments and screenings as appropriate. After reviewing your medical record and screening and assessments performed today your provider may have ordered immunizations, labs, imaging, and/or referrals for you. A list of these orders (if applicable) as well as your Preventive Care list are included within your After Visit Summary for your review. Other Preventive Recommendations:    · A preventive eye exam performed by an eye specialist is recommended every 1-2 years to screen for glaucoma; cataracts, macular degeneration, and other eye disorders. · A preventive dental visit is recommended every 6 months. · Try to get at least 150 minutes of exercise per week or 10,000 steps per day on a pedometer . · Order or download the FREE \"Exercise & Physical Activity: Your Everyday Guide\" from [employer]. Call 5-555.355.7313 or search [employer] online. · You need 9866-4103 mg of calcium and 4658-6777 IU of vitamin D per day. It is possible to meet your calcium requirement with diet alone, but a vitamin D supplement is usually necessary to meet this goal.  · When exposed to the sun, use a sunscreen that protects against both UVA and UVB radiation with an SPF of 30 or greater. Reapply every 2 to 3 hours or after sweating, drying off with a towel, or swimming. · Always wear a seat belt when traveling in a car. Always wear a helmet when riding a bicycle or motorcycle.

## 2021-10-26 ENCOUNTER — OUTSIDE SERVICES (OUTPATIENT)
Dept: FAMILY MEDICINE CLINIC | Age: 76
End: 2021-10-26
Payer: MEDICARE

## 2021-10-26 VITALS
SYSTOLIC BLOOD PRESSURE: 146 MMHG | WEIGHT: 189 LBS | OXYGEN SATURATION: 93 % | DIASTOLIC BLOOD PRESSURE: 94 MMHG | BODY MASS INDEX: 29.6 KG/M2 | TEMPERATURE: 97.8 F | HEART RATE: 92 BPM | RESPIRATION RATE: 18 BRPM

## 2021-10-26 DIAGNOSIS — Z85.89 HX OF SQUAMOUS CELL CARCINOMA: ICD-10-CM

## 2021-10-26 DIAGNOSIS — K59.09 OTHER CONSTIPATION: ICD-10-CM

## 2021-10-26 DIAGNOSIS — G40.909 SEIZURE DISORDER (HCC): Primary | ICD-10-CM

## 2021-10-26 DIAGNOSIS — I10 ESSENTIAL HYPERTENSION: ICD-10-CM

## 2021-10-26 DIAGNOSIS — M54.41 CHRONIC LOW BACK PAIN WITH BILATERAL SCIATICA, UNSPECIFIED BACK PAIN LATERALITY: ICD-10-CM

## 2021-10-26 DIAGNOSIS — F41.1 GENERALIZED ANXIETY DISORDER: ICD-10-CM

## 2021-10-26 DIAGNOSIS — G89.29 CHRONIC LOW BACK PAIN WITH BILATERAL SCIATICA, UNSPECIFIED BACK PAIN LATERALITY: ICD-10-CM

## 2021-10-26 DIAGNOSIS — M54.42 CHRONIC LOW BACK PAIN WITH BILATERAL SCIATICA, UNSPECIFIED BACK PAIN LATERALITY: ICD-10-CM

## 2021-10-26 PROCEDURE — 99490 CHRNC CARE MGMT STAFF 1ST 20: CPT | Performed by: FAMILY MEDICINE

## 2021-10-26 ASSESSMENT — ENCOUNTER SYMPTOMS
RHINORRHEA: 0
CONSTIPATION: 0
DIARRHEA: 0
EYE REDNESS: 0
SHORTNESS OF BREATH: 0
NAUSEA: 0
SORE THROAT: 0
VOMITING: 0
COUGH: 0

## 2021-10-26 NOTE — PROGRESS NOTES
05/17/2030    Pneumococcal 65+ years Vaccine  Completed    Hepatitis A vaccine  Aged Out    Hepatitis B vaccine  Aged Out    Hib vaccine  Aged Out    Meningococcal (ACWY) vaccine  Aged Out       Subjective:      Review of Systems   Constitutional: Negative for chills and fever. HENT: Negative for congestion, rhinorrhea and sore throat. Eyes: Positive for visual disturbance. Negative for redness. Respiratory: Negative for cough and shortness of breath. Cardiovascular: Negative for chest pain and leg swelling. Gastrointestinal: Negative for constipation, diarrhea, nausea and vomiting. Endocrine: Negative for polydipsia and polyuria. Genitourinary: Negative for dysuria, frequency and hematuria. Musculoskeletal: Negative for arthralgias, gait problem and myalgias. Skin: Positive for rash (abrasions on arms). Negative for wound. Neurological: Negative for dizziness, light-headedness and headaches. Psychiatric/Behavioral: Negative for dysphoric mood and sleep disturbance. The patient is nervous/anxious. Objective:     Physical Exam  Vitals and nursing note reviewed. Constitutional:       General: He is not in acute distress. Appearance: He is well-developed. HENT:      Head: Normocephalic and atraumatic. Right Ear: External ear normal.      Left Ear: External ear normal.      Nose: Nose normal.   Eyes:      Conjunctiva/sclera: Conjunctivae normal.   Neck:      Thyroid: No thyromegaly. Vascular: No JVD. Cardiovascular:      Rate and Rhythm: Normal rate and regular rhythm. Heart sounds: Normal heart sounds. Pulmonary:      Effort: Pulmonary effort is normal. No respiratory distress. Breath sounds: Normal breath sounds. No stridor. No wheezing or rales. Abdominal:      General: Bowel sounds are normal. There is no distension. Palpations: Abdomen is soft. Tenderness: There is no abdominal tenderness.    Musculoskeletal:      Cervical back: Neck supple. Skin:     General: Skin is warm and dry. Findings: Abrasion, erythema and signs of injury present. No rash. Neurological:      Mental Status: He is alert and oriented to person, place, and time. Motor: No atrophy, abnormal muscle tone or seizure activity. Psychiatric:         Mood and Affect: Mood is anxious. Speech: Speech normal.         Behavior: Behavior normal.         Thought Content: Thought content is paranoid. BP (!) 146/94   Pulse 92   Temp 97.8 °F (36.6 °C)   Resp 18   Wt 189 lb (85.7 kg)   SpO2 93%   BMI 29.60 kg/m²     Assessment/Plan      1. Seizure disorder [address] [address] - Continue on current seizure medications and follow up with Neurology as directed. 2. Essential hypertension - Blood pressure has been stable overall so will continue on Norvasc and Lisinopril. 3. Hx of squamous cell carcinoma - Continue to monitor for skin changes. 4. Generalized anxiety disorder - His anxiety has been stable overall. Continue to follow with [facility]. 5. Chronic low back pain with bilateral sciatica, unspecified back pain laterality - THis has been stable so will continue Gabapentin and Tylenol. 6. Other constipation - Continue to monitor bowel function. Resident has HTN, Anxiety, OA, Seizure disorder and it is expected to last 12 or more months. These chronic conditions place resident at a significant risk of death, acute exacerbation, or functional decline. The patient's comprehensive plan was monitored today. I spent 20 minutes reviewing plan. Patient seen and examined. History partially obtained by chart review and nursing notes. I have reviewed patient's past medical, surgical, social, and family history and have made updates where appropriate.   See facility EMR for updated medication list.       Electronically signed by Jacquie Brown MD on 10/27/2021 at 5:16 PM

## 2022-01-24 ENCOUNTER — HOSPITAL ENCOUNTER (OUTPATIENT)
Dept: MRI IMAGING | Age: 77
Discharge: HOME | End: 2022-01-24
Payer: MEDICARE

## 2022-01-24 ENCOUNTER — HOSPITAL ENCOUNTER (OUTPATIENT)
Age: 77
Discharge: HOME | End: 2022-01-24
Payer: MEDICARE

## 2022-01-24 DIAGNOSIS — H53.461 RIGHT HOMONYMOUS HEMIANOPSIA: ICD-10-CM

## 2022-01-24 LAB
CREATININE, WHOLE BLOOD: 0.9 MG/DL (ref 0.5–1.2)
ESTIMATED GFR, PCACC: 87 ML/MIN/1.73M2

## 2022-01-24 PROCEDURE — 6360000004 HC RX CONTRAST MEDICATION: Performed by: OPHTHALMOLOGY

## 2022-01-24 PROCEDURE — 82565 ASSAY OF CREATININE: CPT

## 2022-01-24 PROCEDURE — 70553 MRI BRAIN STEM W/O & W/DYE: CPT

## 2022-01-24 PROCEDURE — A9579 GAD-BASE MR CONTRAST NOS,1ML: HCPCS | Performed by: OPHTHALMOLOGY

## 2022-01-24 RX ADMIN — GADOTERIDOL 18 ML: 279.3 INJECTION, SOLUTION INTRAVENOUS at 10:27

## 2022-04-15 ENCOUNTER — HOSPITAL ENCOUNTER (EMERGENCY)
Age: 77
Discharge: HOME | End: 2022-04-15
Attending: EMERGENCY MEDICINE
Payer: MEDICARE

## 2022-04-15 ENCOUNTER — APPOINTMENT (OUTPATIENT)
Dept: CT IMAGING | Age: 77
End: 2022-04-15
Payer: MEDICARE

## 2022-04-15 VITALS
WEIGHT: 194 LBS | OXYGEN SATURATION: 94 % | TEMPERATURE: 97.6 F | RESPIRATION RATE: 24 BRPM | SYSTOLIC BLOOD PRESSURE: 143 MMHG | HEART RATE: 80 BPM | DIASTOLIC BLOOD PRESSURE: 78 MMHG | BODY MASS INDEX: 30.38 KG/M2

## 2022-04-15 DIAGNOSIS — M54.50 LUMBAR BACK PAIN: Primary | ICD-10-CM

## 2022-04-15 LAB
ALBUMIN SERPL-MCNC: 4.3 G/DL (ref 3.5–5.1)
ALP BLD-CCNC: 173 U/L (ref 38–126)
ALT SERPL-CCNC: 21 U/L (ref 11–66)
ANION GAP SERPL CALCULATED.3IONS-SCNC: 12 MEQ/L (ref 8–16)
AST SERPL-CCNC: 25 U/L (ref 5–40)
BASOPHILS # BLD: 0.9 %
BASOPHILS ABSOLUTE: 0 THOU/MM3 (ref 0–0.1)
BILIRUB SERPL-MCNC: 0.3 MG/DL (ref 0.3–1.2)
BUN BLDV-MCNC: 16 MG/DL (ref 7–22)
CALCIUM SERPL-MCNC: 9 MG/DL (ref 8.5–10.5)
CHLORIDE BLD-SCNC: 107 MEQ/L (ref 98–111)
CO2: 22 MEQ/L (ref 23–33)
CREAT SERPL-MCNC: 1 MG/DL (ref 0.4–1.2)
EOSINOPHIL # BLD: 3.9 %
EOSINOPHILS ABSOLUTE: 0.2 THOU/MM3 (ref 0–0.4)
ERYTHROCYTE [DISTWIDTH] IN BLOOD BY AUTOMATED COUNT: 13.1 % (ref 11.5–14.5)
ERYTHROCYTE [DISTWIDTH] IN BLOOD BY AUTOMATED COUNT: 45.7 FL (ref 35–45)
GFR SERPL CREATININE-BSD FRML MDRD: 73 ML/MIN/1.73M2
GLUCOSE BLD-MCNC: 115 MG/DL (ref 70–108)
HCT VFR BLD CALC: 39.1 % (ref 42–52)
HEMOGLOBIN: 13.4 GM/DL (ref 14–18)
IMMATURE GRANS (ABS): 0.02 THOU/MM3 (ref 0–0.07)
IMMATURE GRANULOCYTES: 0.4 %
LIPASE: 30.3 U/L (ref 5.6–51.3)
LYMPHOCYTES # BLD: 29.2 %
LYMPHOCYTES ABSOLUTE: 1.4 THOU/MM3 (ref 1–4.8)
MCH RBC QN AUTO: 32.8 PG (ref 26–33)
MCHC RBC AUTO-ENTMCNC: 34.3 GM/DL (ref 32.2–35.5)
MCV RBC AUTO: 95.6 FL (ref 80–94)
MONOCYTES # BLD: 10.5 %
MONOCYTES ABSOLUTE: 0.5 THOU/MM3 (ref 0.4–1.3)
NUCLEATED RED BLOOD CELLS: 0 /100 WBC
OSMOLALITY CALCULATION: 283.4 MOSMOL/KG (ref 275–300)
PLATELET # BLD: 178 THOU/MM3 (ref 130–400)
PMV BLD AUTO: 9.4 FL (ref 9.4–12.4)
POTASSIUM REFLEX MAGNESIUM: 4 MEQ/L (ref 3.5–5.2)
RBC # BLD: 4.09 MILL/MM3 (ref 4.7–6.1)
SEG NEUTROPHILS: 55.1 %
SEGMENTED NEUTROPHILS ABSOLUTE COUNT: 2.6 THOU/MM3 (ref 1.8–7.7)
SODIUM BLD-SCNC: 141 MEQ/L (ref 135–145)
TOTAL PROTEIN: 7.4 G/DL (ref 6.1–8)
WBC # BLD: 4.7 THOU/MM3 (ref 4.8–10.8)

## 2022-04-15 PROCEDURE — 76376 3D RENDER W/INTRP POSTPROCES: CPT

## 2022-04-15 PROCEDURE — 6360000002 HC RX W HCPCS: Performed by: EMERGENCY MEDICINE

## 2022-04-15 PROCEDURE — 2580000003 HC RX 258: Performed by: EMERGENCY MEDICINE

## 2022-04-15 PROCEDURE — 80053 COMPREHEN METABOLIC PANEL: CPT

## 2022-04-15 PROCEDURE — 85025 COMPLETE CBC W/AUTO DIFF WBC: CPT

## 2022-04-15 PROCEDURE — 6370000000 HC RX 637 (ALT 250 FOR IP): Performed by: EMERGENCY MEDICINE

## 2022-04-15 PROCEDURE — 83690 ASSAY OF LIPASE: CPT

## 2022-04-15 PROCEDURE — 96374 THER/PROPH/DIAG INJ IV PUSH: CPT

## 2022-04-15 PROCEDURE — 99285 EMERGENCY DEPT VISIT HI MDM: CPT

## 2022-04-15 PROCEDURE — 96375 TX/PRO/DX INJ NEW DRUG ADDON: CPT

## 2022-04-15 PROCEDURE — 74176 CT ABD & PELVIS W/O CONTRAST: CPT

## 2022-04-15 RX ORDER — LIDOCAINE 4 G/G
1 PATCH TOPICAL DAILY
Status: DISCONTINUED | OUTPATIENT
Start: 2022-04-15 | End: 2022-04-15 | Stop reason: HOSPADM

## 2022-04-15 RX ORDER — FENTANYL CITRATE 50 UG/ML
25 INJECTION, SOLUTION INTRAMUSCULAR; INTRAVENOUS ONCE
Status: COMPLETED | OUTPATIENT
Start: 2022-04-15 | End: 2022-04-15

## 2022-04-15 RX ORDER — SODIUM CHLORIDE 9 MG/ML
1000 INJECTION, SOLUTION INTRAVENOUS CONTINUOUS
Status: DISCONTINUED | OUTPATIENT
Start: 2022-04-15 | End: 2022-04-15 | Stop reason: HOSPADM

## 2022-04-15 RX ORDER — NAPROXEN 250 MG/1
250 TABLET ORAL 2 TIMES DAILY WITH MEALS
Qty: 14 TABLET | Refills: 0 | Status: SHIPPED | OUTPATIENT
Start: 2022-04-15 | End: 2022-04-22

## 2022-04-15 RX ORDER — ONDANSETRON 2 MG/ML
4 INJECTION INTRAMUSCULAR; INTRAVENOUS ONCE
Status: COMPLETED | OUTPATIENT
Start: 2022-04-15 | End: 2022-04-15

## 2022-04-15 RX ADMIN — ONDANSETRON 4 MG: 2 INJECTION INTRAMUSCULAR; INTRAVENOUS at 08:08

## 2022-04-15 RX ADMIN — FENTANYL CITRATE 25 MCG: 50 INJECTION, SOLUTION INTRAMUSCULAR; INTRAVENOUS at 08:10

## 2022-04-15 RX ADMIN — SODIUM CHLORIDE 1000 ML: 9 INJECTION, SOLUTION INTRAVENOUS at 08:08

## 2022-04-15 ASSESSMENT — PAIN - FUNCTIONAL ASSESSMENT
PAIN_FUNCTIONAL_ASSESSMENT: 0-10
PAIN_FUNCTIONAL_ASSESSMENT: 0-10

## 2022-04-15 ASSESSMENT — ENCOUNTER SYMPTOMS
SORE THROAT: 0
CONSTIPATION: 0
COUGH: 0
DIARRHEA: 0
VOMITING: 0
SHORTNESS OF BREATH: 0
BACK PAIN: 1
NAUSEA: 0
ABDOMINAL PAIN: 0
RHINORRHEA: 0

## 2022-04-15 ASSESSMENT — PAIN DESCRIPTION - PAIN TYPE
TYPE: ACUTE PAIN

## 2022-04-15 ASSESSMENT — PAIN DESCRIPTION - LOCATION
LOCATION: ABDOMEN

## 2022-04-15 ASSESSMENT — PAIN DESCRIPTION - ORIENTATION: ORIENTATION: LEFT;LOWER

## 2022-04-15 ASSESSMENT — PAIN SCALES - GENERAL
PAINLEVEL_OUTOF10: 9
PAINLEVEL_OUTOF10: 5
PAINLEVEL_OUTOF10: 9

## 2022-04-15 NOTE — ED NOTES
Pt to ED c/o chronic low back pain x 1 month that had worsened last night and new right lower quadrant pain that is a 9/10. Pt denies any urinary symptoms. Pt states the back pain is in the right lower area of his back. Pt respirations are unlabored. Monitor applied. Iv inserted.       Enedina Tompkins RN  04/15/22 6481

## 2022-04-15 NOTE — ED NOTES
Pt resting on cot with unlabored respirations and family at bedside.       Naomi Romero RN  04/15/22 0035

## 2022-04-15 NOTE — ED PROVIDER NOTES
[facility] COMPLAINT       Chief Complaint   Patient presents with    Abdominal Pain       Nurses Notes reviewed and I agree except as noted in the HPI. HISTORY OF PRESENT ILLNESS    Duane Beam is a 68 y.o. pleasant male who presents to the emergency department for abdominal pain. Patient states the pain began in the right side over a month ago and was \"at first small but then it gradually started to get worse\". Patient states he tried Tylenol initially which did help alleviate his pain but the pain periodically continued to come back. He recently tried a warm body pack without relief. He states this morning his pain was \"real bad, like I was being stuck with a knife\". Patient states he has had similar pain in the past, 1 year ago had a pain like this and it was due to sitting in a chair which was bad. He states he has an office chair that he has been sitting in a lot recently, he had someone come out to check the chair who told him that it was \"no good anymore\". He suspects that sitting in this chair has exacerbating his pain. He has had diarrhea described as loose stools, 2 times a day for the past 2 days. No black or bloody stools. No recent fall or trauma. No numbness, tingling, weakness. No fever, nausea, vomiting, appetite change, dysuria, frequency, or hematuria. No radiation of the pain. No other initial complaints or concerns. REVIEW OF SYSTEMS     Review of Systems   Constitutional: Negative for diaphoresis and fever. HENT: Negative for congestion, rhinorrhea and sore throat. Eyes: Negative for visual disturbance. Respiratory: Negative for cough and shortness of breath. Cardiovascular: Negative for chest pain, palpitations and leg swelling. Gastrointestinal: Negative for abdominal pain, constipation, diarrhea, nausea and vomiting. Endocrine: Negative for polyuria.    Genitourinary: Positive for flank pain. Negative for dysuria. Musculoskeletal: Positive for back pain. Negative for joint swelling. Skin: Negative for rash. Neurological: Negative for dizziness, seizures, syncope, speech difficulty, weakness, numbness and headaches. Hematological: Negative for adenopathy. Psychiatric/Behavioral: Negative for confusion and self-injury. All other systems reviewed and are negative. PAST MEDICAL HISTORY    has a past medical history of Anxiety disorder, Cancer ([facility] [address] COPD (chronic obstructive pulmonary disease) ([facility] [address] HLD (hyperlipidemia), and [facility]     SURGICAL HISTORY      has a past surgical history that includes Hemorrhoid surgery and Cataract removal.    CURRENT MEDICATIONS       Discharge Medication List as of 4/15/2022 10:23 AM      CONTINUE these medications which have NOT CHANGED    Details   lidocaine viscous hcl (XYLOCAINE) 2 % SOLN solution Take 5 mLs by mouth every 3 hours as needed for Dental Pain, Disp-100 mL,R-0Print      loperamide (IMODIUM) 2 MG capsule Take 2 mg by mouth 4 times daily as needed for DiarrheaHistorical Med      Eslicarbazepine Acetate (APTIOM) 400 MG TABS Take 1,000 mg by mouth daily Historical Med      amLODIPine (NORVASC) 5 MG tablet Take 5 mg by mouth dailyHistorical Med      lisinopril (PRINIVIL;ZESTRIL) 10 MG tablet Take 10 mg by mouth dailyHistorical Med      acetaminophen (TYLENOL) 325 MG tablet Take 650 mg by mouth every 6 hours as needed for PainHistorical Med      mirtazapine (REMERON) 15 MG tablet Take 15 mg by mouth nightlyHistorical Med      pravastatin (PRAVACHOL) 40 MG tablet Take 40 mg by mouth dailyHistorical Med      Hypromellose (GENTEAL MILD TO MODERATE) 0.3 % SOLN Apply 1 drop to eye dailyHistorical Med      levETIRAcetam (KEPPRA) 500 MG tablet Take 1,500 mg by mouth 2 times daily Historical Med      cetirizine (ZYRTEC) 10 MG tablet Take 10 mg by mouth dailyHistorical Med      Multiple Vitamins-Minerals (CENTRUM SILVER PO) Take 1 tablet by mouth daily. ALLERGIES     has No Known Allergies. FAMILY HISTORY     He indicated that his mother is . He indicated that his father is . family history includes Hypertension in his mother; Stroke in his father. SOCIAL HISTORY      reports that he quit smoking about 3 years ago. His smoking use included cigarettes. He has a 13.25 pack-year smoking history. He has never used smokeless tobacco. He reports that he does not drink alcohol and does not use drugs. PHYSICAL EXAM     INITIAL VITALS:  weight is 194 lb (88 kg). His oral temperature is 97.6 °F (36.4 °C). His blood pressure is 143/78 (abnormal) and his pulse is 80. His respiration is 24 and oxygen saturation is 94%. CONSTITUTIONAL: [Awake, alert, non toxic, well developed, well nourished, no acute distress, appears anxious and slightly uncomfortable]  HEAD: [Normocephalic, atraumatic]  EYES: [Pupils equal, round & reactive to light, extraocular movements intact, no nystagmus, clear conjunctiva, non-icteric sclera]  ENT: [External ear canal clear without evidence of cerumen impaction or foreign body, TM's clear without erythema or bulging. Nares patent without drainage, septum appears midline. Moist mucus membranes, oropharynx clear without exudate, erythema, or mass. Uvula midline]  NECK: [Nontender and supple. No meningismus, no appreciated lymphadenopathy. Intact full range of motion. C-spine midline without vertebral tenderness. Trachea midline.]  CHEST: [Inspection normal, no lesions, equal rise. No crepitus or tenderness upon palpation.]  CARDIOVASCULAR: [Regular rate, rhythm, normal S1 and S2. No appreciated murmurs, rubs, or gallops. No pulse deficits appreciated. Intact distal perfusion. JVD not appreciated.]  PULMONARY: [Respiratory distress absent. Respiratory effort normal. Breath sounds clear to auscultation without rhonchi, rales, or wheezing. No accessory muscle use.  No stridor]  ABDOMEN: [Inspection normal, without surgical scars. Soft, non-tender, non-distended, with normoactive bowel sounds. No palpable masses, rebound, or guarding]  BACK: [Intact ROM. No midline vertebral tenderness, step off, or crepitus. +R paraspinal tenderness in lumbar region. No CVA tenderness.]  MUSCULOSKELETAL: [Extremities nontender to palpation. No gross deformity or evidence of external trauma. Intact range of motion. Sensation intact. No clubbing, cyanosis, or edema.]  SKIN: [Warm, dry. No jaundice, rash, urticaria, or petechiae]  NEUROLOGIC: [Alert and oriented x 3, GCS 15, normal mentation for age. Moves all four extremities. No gross sensory deficit. Cerebellar function grossly normal.]  PSYCHIATRIC: [Normal mood and affect, thought process is clear and linear]     DIFFERENTIAL DIAGNOSIS:   Lumbar strain, ?radiculopathy, ??ureterolithiasis, less likely appendicitis    DIAGNOSTIC RESULTS         RADIOLOGY: non-plain film images(s) such as CT,Ultrasound and MRI are read by the radiologist.    CT ABDOMEN PELVIS WO CONTRAST Additional Contrast? None   Final Result   1. Minimal bilateral lower lung atelectasis/infiltrate. 2. No acute intra-abdominal or intrapelvic findings. 3. Nonobstructive right-sided nephrolithiasis. 4. Cholelithiasis. 5. Sigmoid colon diverticulosis. Final report electronically signed by Dr. Emy Keyes on 4/15/2022 9:12 AM      CT LUMBAR RECONSTRUCTION WO POST PROCESS   Final Result       1. Mild dextroscoliosis. 2. Diffuse osteopenia. 3. Degenerative changes resulting in mild canal and mild-to-moderate bilateral foraminal stenosis at L2-3 and L3-4 and to lesser extent L1-2.   4. There is mild canal and bilateral foraminal stenosis at L4-5.   5. There is degenerative change involving the sacroiliac joints bilaterally. 6. There is deformity of the left iliac crest, possibly related to old trauma.                **This report has been created using voice recognition software. It may contain minor errors which are inherent in voice recognition technology. **      Final report electronically signed by DR Laisha Goddard on 4/15/2022 9:04 AM        [] Visualized and interpreted by me   [x] Radiologist's Wet Read Report Reviewed   [] Discussed withRadiologist.    LABS:   Labs Reviewed   CBC WITH AUTO DIFFERENTIAL - Abnormal; Notable for the following components:       Result Value    WBC 4.7 (*)     RBC 4.09 (*)     Hemoglobin 13.4 (*)     Hematocrit 39.1 (*)     MCV 95.6 (*)     RDW-SD 45.7 (*)     All other components within normal limits   COMPREHENSIVE METABOLIC PANEL W/ REFLEX TO MG FOR LOW K - Abnormal; Notable for the following components:    Glucose 115 (*)     CO2 22 (*)     Alkaline Phosphatase 173 (*)     All other components within normal limits   GLOMERULAR FILTRATION RATE, ESTIMATED - Abnormal; Notable for the following components:    Est, Glom Filt Rate 73 (*)     All other components within normal limits   LIPASE   ANION GAP   OSMOLALITY   URINALYSIS WITH REFLEX TO CULTURE       EMERGENCY DEPARTMENT COURSE:   Vitals:    Vitals:    04/15/22 0729 04/15/22 0812 04/15/22 0913   BP: (!) 149/75 (!) 142/78 (!) 143/78   Pulse: 90 71 80   Resp: 18 23 24   Temp: 97.6 °F (36.4 °C)     TempSrc: Oral     SpO2: 99% 98% 94%   Weight: 194 lb (88 kg)         The results of pertinent diagnostic studies and exam findings were discussed. The patients provisional diagnosis and plan of care were discussed with the patient and present family. The patient and/or present family expressed understanding of the diagnosis and plan. The nurse was instructed to provide written instructions and appropriate follow-up information. The patient understands their need and responsibility to obtain additional follow-up as instructed. The patient is comfortable with the plan and discharge. The risks of medications administered and prescribed were discussed with the patient and family present. Patient reports pain is completely relieved. Has not provided urine sample, would like to be discharged after test results have been obtained. States he will follow up with PCP if he develops any urinary concerns. CRITICAL CARE:   None    CONSULTS:  None    PROCEDURES:  None    FINAL IMPRESSION      1. Lumbar back pain          DISPOSITION/PLAN   Discharge    PATIENT REFERRED TO:  Tanisha Gonzales [id number]  [facility] [phone]    Schedule an appointment as soon as possible for a visit         DISCHARGE MEDICATIONS:  Discharge Medication List as of 4/15/2022 10:23 AM      START taking these medications    Details   naproxen (NAPROSYN) 250 MG tablet Take 1 tablet by mouth 2 times daily (with meals) for 7 days, Disp-14 tablet, R-0Normal             (Please note that portions of this note were completed with a voice recognition program.  Efforts were made to edit the dictations but occasionally words are mis-transcribed.)    Provider:  I personally performed the services described in the documentation, reviewed and edited the documentation which was dictated, and it accurately records my words and actions.     Gemma Palumbo MD 4/15/22 2:27 PM                Gemma Palumbo MD  04/15/22 2890

## 2022-04-26 RX ORDER — PRAVASTATIN SODIUM 40 MG
40 TABLET ORAL DAILY
Qty: 90 TABLET | Refills: 1 | Status: SHIPPED | OUTPATIENT
Start: 2022-04-26

## 2022-04-26 RX ORDER — LISINOPRIL 10 MG/1
10 TABLET ORAL DAILY
Qty: 90 TABLET | Refills: 1 | Status: SHIPPED | OUTPATIENT
Start: 2022-04-26

## 2022-08-22 ENCOUNTER — HOSPITAL ENCOUNTER (OUTPATIENT)
Dept: MRI IMAGING | Age: 77
Discharge: HOME | End: 2022-08-22
Payer: MEDICARE

## 2022-08-22 DIAGNOSIS — M53.3 SACRO ILIAL PAIN: ICD-10-CM

## 2022-08-22 DIAGNOSIS — M54.17 LUMBOSACRAL RADICULOPATHY: ICD-10-CM

## 2022-08-22 DIAGNOSIS — M54.16 LUMBAR RADICULOPATHY: ICD-10-CM

## 2022-08-22 DIAGNOSIS — M51.37 DEGENERATION OF LUMBAR OR LUMBOSACRAL INTERVERTEBRAL DISC: ICD-10-CM

## 2022-08-22 DIAGNOSIS — M51.36 DEGENERATION OF LUMBAR INTERVERTEBRAL DISC: ICD-10-CM

## 2022-08-22 PROCEDURE — 72148 MRI LUMBAR SPINE W/O DYE: CPT

## 2023-01-04 ENCOUNTER — OUTSIDE SERVICES (OUTPATIENT)
Dept: FAMILY MEDICINE CLINIC | Age: 78
End: 2023-01-04

## 2023-01-04 VITALS
WEIGHT: 198 LBS | BODY MASS INDEX: 31.01 KG/M2 | OXYGEN SATURATION: 97 % | SYSTOLIC BLOOD PRESSURE: 159 MMHG | TEMPERATURE: 97.8 F | DIASTOLIC BLOOD PRESSURE: 80 MMHG | RESPIRATION RATE: 18 BRPM | HEART RATE: 80 BPM

## 2023-01-04 DIAGNOSIS — M54.42 CHRONIC LOW BACK PAIN WITH BILATERAL SCIATICA, UNSPECIFIED BACK PAIN LATERALITY: ICD-10-CM

## 2023-01-04 DIAGNOSIS — Z85.89 HX OF SQUAMOUS CELL CARCINOMA: ICD-10-CM

## 2023-01-04 DIAGNOSIS — Z00.00 ENCOUNTER FOR MEDICARE ANNUAL WELLNESS EXAM: Primary | ICD-10-CM

## 2023-01-04 DIAGNOSIS — G89.29 CHRONIC LOW BACK PAIN WITH BILATERAL SCIATICA, UNSPECIFIED BACK PAIN LATERALITY: ICD-10-CM

## 2023-01-04 DIAGNOSIS — M54.41 CHRONIC LOW BACK PAIN WITH BILATERAL SCIATICA, UNSPECIFIED BACK PAIN LATERALITY: ICD-10-CM

## 2023-01-04 DIAGNOSIS — H54.40 BLINDNESS OF LEFT EYE WITH NORMAL VISION IN CONTRALATERAL EYE: ICD-10-CM

## 2023-01-04 DIAGNOSIS — I10 ESSENTIAL HYPERTENSION: ICD-10-CM

## 2023-01-04 DIAGNOSIS — G40.909 SEIZURE DISORDER (HCC): ICD-10-CM

## 2023-01-04 DIAGNOSIS — F41.1 GENERALIZED ANXIETY DISORDER: ICD-10-CM

## 2023-01-04 ASSESSMENT — ENCOUNTER SYMPTOMS
VOMITING: 0
SORE THROAT: 0
RHINORRHEA: 0
EYE REDNESS: 0
COUGH: 0
DIARRHEA: 1
NAUSEA: 0
SHORTNESS OF BREATH: 0

## 2023-01-04 NOTE — PROGRESS NOTES
Zandra Shelton is a 68 y.o. male who presents today for his medical conditions/complaints as noted below. Chief Complaint   Patient presents with    Medicare AWV    3 Month Follow-Up       HPI:     Sophia Paradasharla \"Skip\" Yazmin Frazier is a 67 yo male who was seen today in his room at [facility] for follow up of his chronic medical conditions including seizures, anxiety, skin cancer, HTN, HLD, COPD, and left eye blindness. He continues to stay busy working on his wood projects. He thinks he's been having some mini seizures or starring spells as one day he was on the phone with his sister, put the phone down while he was talking to her, and forgot she was on the line. He's spoken to his neurologist who reported his medications were effective in taking care of the big seizures but sometimes little ones happen (per the patient). He has not had a recent Keppra level here.     Past Medical History:   Diagnosis Date    Anxiety disorder     Cancer ([facility] [address]     Squamous Cell Cancer    COPD (chronic obstructive pulmonary disease) (HCC)     HLD (hyperlipidemia)     Seizure disorder (HCC)       Past Surgical History:   Procedure Laterality Date    CATARACT REMOVAL      HEMORRHOID SURGERY         Family History   Problem Relation Age of Onset    Hypertension Mother     Stroke Father        Social History     Tobacco Use    Smoking status: Former     Packs/day: 0.25     Years: 53.00     Pack years: 13.25     Types: Cigarettes     Quit date: 12/10/2018     Years since quittin.0    Smokeless tobacco: Never   Substance Use Topics    Alcohol use: No      No Known Allergies    Health Maintenance   Topic Date Due    COVID-19 Vaccine (1) Never done    Lipids  Never done    Hepatitis C screen  Never done    Shingles vaccine (1 of 2) Never done    Flu vaccine (1) Never done    Depression Screen  2022    Annual Wellness Visit (AWV)  2022    DTaP/Tdap/Td vaccine (2 - Td or Tdap) 2030    Pneumococcal 65+ years Vaccine  Completed    Hepatitis A vaccine  Aged Out    Hib vaccine  Aged Out    Meningococcal (ACWY) vaccine  Aged Out       Subjective:      Review of Systems   Constitutional:  Negative for chills and fever. HENT:  Negative for congestion, rhinorrhea and sore throat. Eyes:  Positive for visual disturbance. Negative for redness. Respiratory:  Negative for cough and shortness of breath. Cardiovascular:  Negative for chest pain and leg swelling. Gastrointestinal:  Positive for diarrhea. Negative for nausea and vomiting. Endocrine: Negative for polydipsia and polyuria. Genitourinary:  Negative for dysuria, frequency and hematuria. Musculoskeletal:  Negative for gait problem. Skin:  Negative for wound. Neurological:  Positive for seizures. Negative for dizziness, light-headedness and headaches. Psychiatric/Behavioral:  Negative for dysphoric mood and sleep disturbance. The patient is nervous/anxious. Objective:     Physical Exam  Vitals and nursing note reviewed. Constitutional:       General: He is not in acute distress. Appearance: He is well-developed. HENT:      Head: Normocephalic and atraumatic. Nose: Nose normal.   Eyes:      Conjunctiva/sclera: Conjunctivae normal.   Cardiovascular:      Rate and Rhythm: Normal rate and regular rhythm. Heart sounds: Normal heart sounds. Pulmonary:      Effort: Pulmonary effort is normal. No respiratory distress. Breath sounds: Normal breath sounds. No stridor. No wheezing or rales. Abdominal:      General: Bowel sounds are normal. There is no distension. Palpations: Abdomen is soft. Tenderness: There is no abdominal tenderness. Musculoskeletal:      Cervical back: Neck supple. Skin:     General: Skin is warm and dry. Findings: No erythema or rash. Neurological:      Mental Status: He is alert and oriented to person, place, and time. Psychiatric:         Mood and Affect: Mood is anxious. Speech: Speech normal.         Behavior: Behavior normal.     BP (!) 159/80   Pulse 80   Temp 97.8 °F (36.6 °C)   Resp 18   Wt 198 lb (89.8 kg)   SpO2 97%   BMI 31.01 kg/m²     Assessment/Plan      1. Seizure disorder ([facility] [address] - Will check a Keppra level. Continue on current seizure medications and follow up with Neurology  as directed. 2. Left eye blindness - Follow with ophthalmology as directed. 3. Essential hypertension - Blood pressure has been elevated so will continue on Lisinopril 40 mg and increase Amlodipine from 5 mg to 10  mg daily. 4. Hx of squamous cell carcinoma - Continue to monitor for skin changes. 5. Generalized anxiety disorder - His anxiety has been stable overall so he will continue to follow with [facility]. 6. Chronic low back pain with bilateral sciatica, unspecified back pain laterality - This chronic condition has been stable so will continue Gabapentin and Tylenol. Resident has HTN, Anxiety, OA, Seizure disorder and it is expected to last 12 or more months. These chronic conditions place resident at a significant risk of death, acute exacerbation, or functional decline. The patient's comprehensive plan was monitored today. I spent 20 minutes reviewing plan. Patient seen and examined. History partially obtained by chart review and nursing notes. I have reviewed patient's past medical, surgical, social, and family history and have made updates where appropriate.   See facility EMR for updated medication list.       Electronically signed by Nikole Anglin MD on 1/4/2023 at 6:51 PM

## 2023-02-16 NOTE — PROGRESS NOTES
Zhanna Hsu is a 76 y.o. male who presents today for his medical conditions/complaints as noted below. Chief Complaint   Patient presents with    Skin Problem           HPI:     Rash: Zhanna Hsu is a 76 y.o. male who presents with a 1 week history of a scattered rash. Rash first presented on the bilateral arm, bilateral back and upper   lower leg and has not spread. Rash is red and is painful, flat and weeping. Associated symptoms include none. Patient has tried prescription topical steroid - Triamcinolone. New exposures: laundry detergent- facility laundry detergent and dove men's soap. Patient has not had contacts with similar symptoms. Prior history of similar symptoms: no.        Current Outpatient Medications   Medication Sig Dispense Refill    lidocaine viscous hcl (XYLOCAINE) 2 % SOLN solution Take 5 mLs by mouth every 3 hours as needed for Dental Pain 100 mL 0    loperamide (IMODIUM) 2 MG capsule Take 2 mg by mouth 4 times daily as needed for Diarrhea      Eslicarbazepine Acetate (APTIOM) 400 MG TABS Take 1,000 mg by mouth daily       amLODIPine (NORVASC) 5 MG tablet Take 5 mg by mouth daily      lisinopril (PRINIVIL;ZESTRIL) 10 MG tablet Take 10 mg by mouth daily      acetaminophen (TYLENOL) 325 MG tablet Take 650 mg by mouth every 6 hours as needed for Pain      mirtazapine (REMERON) 15 MG tablet Take 15 mg by mouth nightly      pravastatin (PRAVACHOL) 40 MG tablet Take 40 mg by mouth daily      Hypromellose (GENTEAL MILD TO MODERATE) 0.3 % SOLN Apply 1 drop to eye daily      levETIRAcetam (KEPPRA) 500 MG tablet Take 1,500 mg by mouth 2 times daily       cetirizine (ZYRTEC) 10 MG tablet Take 10 mg by mouth daily      Multiple Vitamins-Minerals (CENTRUM SILVER PO) Take 1 tablet by mouth daily. No current facility-administered medications for this visit. No Known Allergies    Subjective:      Review of Systems   Respiratory: Negative for shortness of breath. Cardiovascular: Negative for leg swelling. Gastrointestinal: Negative for nausea. Skin: Positive for rash. Negative for pallor. Neurological: Negative for weakness. Hematological: Bruises/bleeds easily. Objective:     BP (!) 159/60   Pulse 88   Temp 98 °F (36.7 °C)   Resp 20   Wt 176 lb (79.8 kg)   SpO2 96%   BMI 27.57 kg/m²     Physical Exam  Constitutional:       Appearance: Normal appearance. HENT:      Head: Normocephalic and atraumatic. Pulmonary:      Effort: Pulmonary effort is normal.   Abdominal:      General: Abdomen is flat. Skin:     Findings: Erythema and rash present. Neurological:      General: No focal deficit present. Mental Status: He is alert and oriented to person, place, and time. Psychiatric:         Mood and Affect: Mood is anxious. Behavior: Behavior normal.           Assessment/Plan      1. Rash in adult    Medrol dose pack   Fragrance free dove.   Monitor for infection    Electronically signed by ROBINA Bashir CNP on 1/4/2021 at 12:03 PM [Time Spent: ___ minutes] : I have spent [unfilled] minutes of time on the encounter.

## 2023-03-29 ENCOUNTER — OUTSIDE SERVICES (OUTPATIENT)
Dept: FAMILY MEDICINE CLINIC | Age: 78
End: 2023-03-29
Payer: MEDICARE

## 2023-03-29 VITALS
WEIGHT: 200 LBS | HEART RATE: 80 BPM | SYSTOLIC BLOOD PRESSURE: 170 MMHG | BODY MASS INDEX: 31.32 KG/M2 | TEMPERATURE: 97.8 F | DIASTOLIC BLOOD PRESSURE: 84 MMHG | OXYGEN SATURATION: 97 % | RESPIRATION RATE: 18 BRPM

## 2023-03-29 DIAGNOSIS — G89.29 CHRONIC LOW BACK PAIN WITH BILATERAL SCIATICA, UNSPECIFIED BACK PAIN LATERALITY: ICD-10-CM

## 2023-03-29 DIAGNOSIS — M54.41 CHRONIC LOW BACK PAIN WITH BILATERAL SCIATICA, UNSPECIFIED BACK PAIN LATERALITY: ICD-10-CM

## 2023-03-29 DIAGNOSIS — G40.909 SEIZURE DISORDER (HCC): Primary | ICD-10-CM

## 2023-03-29 DIAGNOSIS — Z85.89 HX OF SQUAMOUS CELL CARCINOMA: ICD-10-CM

## 2023-03-29 DIAGNOSIS — H54.40 BLINDNESS OF LEFT EYE WITH NORMAL VISION IN CONTRALATERAL EYE: ICD-10-CM

## 2023-03-29 DIAGNOSIS — I10 ESSENTIAL HYPERTENSION: ICD-10-CM

## 2023-03-29 DIAGNOSIS — F41.1 GENERALIZED ANXIETY DISORDER: ICD-10-CM

## 2023-03-29 DIAGNOSIS — M54.42 CHRONIC LOW BACK PAIN WITH BILATERAL SCIATICA, UNSPECIFIED BACK PAIN LATERALITY: ICD-10-CM

## 2023-03-29 PROCEDURE — 99349 HOME/RES VST EST MOD MDM 40: CPT | Performed by: FAMILY MEDICINE

## 2023-03-29 PROCEDURE — 99490 CHRNC CARE MGMT STAFF 1ST 20: CPT | Performed by: FAMILY MEDICINE

## 2023-03-29 ASSESSMENT — ENCOUNTER SYMPTOMS
NAUSEA: 0
RHINORRHEA: 0
EYE REDNESS: 0
SORE THROAT: 0
DIARRHEA: 1
COUGH: 0
VOMITING: 0
SHORTNESS OF BREATH: 0

## 2023-03-29 NOTE — PROGRESS NOTES
Mood and Affect: Mood is anxious. Speech: Speech normal.         Behavior: Behavior normal.     BP (!) 170/84   Pulse 80   Temp 97.8 °F (36.6 °C)   Resp 18   Wt 200 lb (90.7 kg)   SpO2 97%   BMI 31.32 kg/m²     Assessment/Plan      1. Seizure disorder [facility]) - He had an episode last week where he blacked out (likely had a seizure) and sustained an abrasion on his hand that is being treated. Continue on current seizure medications and follow up with Neurology  as directed. 2. Left eye blindness - Follow with ophthalmology as directed. 3. Essential hypertension - His blood pressure has been elevated so will continue on Lisinopril and Amlodipine and add HCTZ 12.5 mg daily. 4. Hx of squamous cell carcinoma - Continue to monitor for skin changes. 5. Generalized anxiety disorder - His anxiety has been stable overall so he will continue to follow with [facility]. 6. Chronic low back pain with bilateral sciatica, unspecified back pain laterality - This chronic condition has been stable so will continue Gabapentin and Tylenol. Resident has HTN, Anxiety, OA, Seizure disorder and it is expected to last 12 or more months. These chronic conditions place resident at a significant risk of death, acute exacerbation, or functional decline. The patient's comprehensive plan was monitored today. I spent 20 minutes reviewing plan. Patient seen and examined. History partially obtained by chart review and nursing notes. I have reviewed patient's past medical, surgical, social, and family history and have made updates where appropriate.   See facility EMR for updated medication list.       Electronically signed by Liban Jonas MD on 3/29/2023 at 2:30 PM

## 2023-10-02 VITALS
BODY MASS INDEX: 31.2 KG/M2 | RESPIRATION RATE: 20 BRPM | OXYGEN SATURATION: 96 % | HEART RATE: 68 BPM | TEMPERATURE: 97.6 F | WEIGHT: 199.2 LBS | DIASTOLIC BLOOD PRESSURE: 66 MMHG | SYSTOLIC BLOOD PRESSURE: 114 MMHG

## 2023-10-06 ENCOUNTER — OUTSIDE SERVICES (OUTPATIENT)
Dept: FAMILY MEDICINE CLINIC | Age: 78
End: 2023-10-06

## 2023-10-06 DIAGNOSIS — Z85.89 HX OF SQUAMOUS CELL CARCINOMA: ICD-10-CM

## 2023-10-06 DIAGNOSIS — F41.1 GENERALIZED ANXIETY DISORDER: ICD-10-CM

## 2023-10-06 DIAGNOSIS — M54.42 CHRONIC LOW BACK PAIN WITH BILATERAL SCIATICA, UNSPECIFIED BACK PAIN LATERALITY: ICD-10-CM

## 2023-10-06 DIAGNOSIS — I10 ESSENTIAL HYPERTENSION: ICD-10-CM

## 2023-10-06 DIAGNOSIS — M54.41 CHRONIC LOW BACK PAIN WITH BILATERAL SCIATICA, UNSPECIFIED BACK PAIN LATERALITY: ICD-10-CM

## 2023-10-06 DIAGNOSIS — H54.40 BLINDNESS OF LEFT EYE WITH NORMAL VISION IN CONTRALATERAL EYE: ICD-10-CM

## 2023-10-06 DIAGNOSIS — G40.909 SEIZURE DISORDER (HCC): Primary | ICD-10-CM

## 2023-10-06 DIAGNOSIS — G89.29 CHRONIC LOW BACK PAIN WITH BILATERAL SCIATICA, UNSPECIFIED BACK PAIN LATERALITY: ICD-10-CM

## 2023-10-16 VITALS
HEART RATE: 90 BPM | RESPIRATION RATE: 18 BRPM | DIASTOLIC BLOOD PRESSURE: 86 MMHG | BODY MASS INDEX: 30.07 KG/M2 | TEMPERATURE: 97.6 F | OXYGEN SATURATION: 99 % | WEIGHT: 192 LBS | SYSTOLIC BLOOD PRESSURE: 122 MMHG

## 2023-10-16 ASSESSMENT — ENCOUNTER SYMPTOMS
DIARRHEA: 1
SHORTNESS OF BREATH: 0
COUGH: 0
EYE REDNESS: 0
RHINORRHEA: 0
SORE THROAT: 0
VOMITING: 0
NAUSEA: 0

## 2023-10-16 NOTE — PROGRESS NOTES
Shaila Luna is a 68 y.o. male who presents today for his medical conditions/complaints as noted below. Chief Complaint   Patient presents with    3 Month Follow-Up     F/u chronic medical conditions       HPI:     Emily Meyers \"Skip\" Paramjit Abrams is a 67 yo male who was seen today at [facility] for follow up of his chronic medical conditions including seizures, anxiety, skin cancer, HTN, HLD, COPD, and left eye blindness. His weight is down 7 lbs as he's trying to eat less. He was having pain in his back that improved with therapy . 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 His mood has been stable overall.     Past Medical History:   Diagnosis Date    Anxiety disorder     Cancer ([address])     Squamous Cell Cancer    COPD (chronic obstructive pulmonary disease) (HCC)     HLD (hyperlipidemia)     Seizure disorder (HCC)       Past Surgical History:   Procedure Laterality Date    CATARACT REMOVAL      HEMORRHOID SURGERY         Family History   Problem Relation Age of Onset    Hypertension Mother     Stroke Father        Social History     Tobacco Use    Smoking status: Former     Packs/day: 0.25     Years: 53.00     Additional pack years: 0.00     Total pack years: 13.25     Types: Cigarettes     Quit date: 12/10/2018     Years since quittin.8    Smokeless tobacco: Never   Substance Use Topics    Alcohol use: No      No Known Allergies    Health Maintenance   Topic Date Due    COVID-19 Vaccine (1) Never done    Lipids  Never done    Depression Screen  Never done    Hepatitis C screen  Never done    Shingles vaccine (1 of 2) Never done    Flu vaccine (1) Never done    Annual Wellness Visit (AWV)  2024    DTaP/Tdap/Td vaccine (2 - Td or Tdap) 2030    Pneumococcal 65+ years Vaccine  Completed    Hepatitis A vaccine  Aged Out    Hepatitis B vaccine  Aged Out    Hib vaccine  Aged Out    Meningococcal (ACWY) vaccine  Aged Out    AAA screen

## 2023-10-18 ENCOUNTER — OUTSIDE SERVICES (OUTPATIENT)
Dept: FAMILY MEDICINE CLINIC | Age: 78
End: 2023-10-18
Payer: MEDICARE

## 2023-10-18 DIAGNOSIS — H54.40 BLINDNESS OF LEFT EYE WITH NORMAL VISION IN CONTRALATERAL EYE: ICD-10-CM

## 2023-10-18 DIAGNOSIS — M54.42 CHRONIC LOW BACK PAIN WITH BILATERAL SCIATICA, UNSPECIFIED BACK PAIN LATERALITY: ICD-10-CM

## 2023-10-18 DIAGNOSIS — G89.29 CHRONIC LOW BACK PAIN WITH BILATERAL SCIATICA, UNSPECIFIED BACK PAIN LATERALITY: ICD-10-CM

## 2023-10-18 DIAGNOSIS — G40.909 SEIZURE DISORDER (HCC): Primary | ICD-10-CM

## 2023-10-18 DIAGNOSIS — F41.1 GENERALIZED ANXIETY DISORDER: ICD-10-CM

## 2023-10-18 DIAGNOSIS — Z85.89 HX OF SQUAMOUS CELL CARCINOMA: ICD-10-CM

## 2023-10-18 DIAGNOSIS — M54.41 CHRONIC LOW BACK PAIN WITH BILATERAL SCIATICA, UNSPECIFIED BACK PAIN LATERALITY: ICD-10-CM

## 2023-10-18 DIAGNOSIS — I10 ESSENTIAL HYPERTENSION: ICD-10-CM

## 2023-10-18 PROCEDURE — 99490 CHRNC CARE MGMT STAFF 1ST 20: CPT | Performed by: FAMILY MEDICINE

## 2023-10-18 PROCEDURE — 99349 HOME/RES VST EST MOD MDM 40: CPT | Performed by: FAMILY MEDICINE

## 2023-10-18 ASSESSMENT — ENCOUNTER SYMPTOMS: BACK PAIN: 1

## 2024-01-30 VITALS
TEMPERATURE: 98 F | DIASTOLIC BLOOD PRESSURE: 78 MMHG | RESPIRATION RATE: 18 BRPM | OXYGEN SATURATION: 96 % | HEART RATE: 82 BPM | WEIGHT: 162.4 LBS | BODY MASS INDEX: 25.44 KG/M2 | SYSTOLIC BLOOD PRESSURE: 143 MMHG

## 2024-01-30 NOTE — PROGRESS NOTES
Medicare Annual Wellness Visit    Ceferino Sanches Jr is here for Medicare AWV    Assessment & Plan   Encounter for Medicare annual wellness exam        -     Routine healthcare maintenance was reviewed as below.    Acute right-sided low back pain with right-sided sciatica        -     Will treat with a steroid burst of Prednisone 40 mg daily for 5 days.  If this does not help, he may benefit from PT or imaging for further evaluation.    Recommendations for Preventive Services Due: see orders and patient instructions/AVS.  Recommended screening schedule for the next 5-10 years is provided to the patient in written form: see Patient Instructions/AVS.        Subjective   Skip reports that he's been having pain in his right low back that radiates down through his buttocks through his right lateral thigh to his knee.  There was no inciting injury.  He's had similar pain on the left in the past.  The pain is so bad he's limping and can't get get comfortable.    Patient's complete Health Risk Assessment and screening values have been reviewed and are found in Flowsheets. The following problems were reviewed today and where indicated follow up appointments were made and/or referrals ordered.    Positive Risk Factor Screenings with Interventions:    Fall Risk:  Do you feel unsteady or are you worried about falling? : (!) yes  2 or more falls in past year?: no  Fall with injury in past year?: no     Interventions:    Reviewed medications, home hazards, visual acuity, and co-morbidities that can increase risk for falls  Patient declines any further evaluation or treatment            General HRA Questions:  Select all that apply: (!) New or Increased Pain    Pain Interventions:  See above for details      Activity, Diet, and Weight:  On average, how many days per week do you engage in moderate to strenuous exercise (like a brisk walk)?: 0 days  On average, how many minutes do you engage in exercise at this level?: 0 min    Do

## 2024-01-31 ENCOUNTER — OUTSIDE SERVICES (OUTPATIENT)
Dept: FAMILY MEDICINE CLINIC | Age: 79
End: 2024-01-31
Payer: MEDICARE

## 2024-01-31 DIAGNOSIS — M54.41 ACUTE RIGHT-SIDED LOW BACK PAIN WITH RIGHT-SIDED SCIATICA: ICD-10-CM

## 2024-01-31 DIAGNOSIS — Z00.00 ENCOUNTER FOR MEDICARE ANNUAL WELLNESS EXAM: Primary | ICD-10-CM

## 2024-01-31 PROCEDURE — G0439 PPPS, SUBSEQ VISIT: HCPCS | Performed by: FAMILY MEDICINE

## 2024-01-31 PROCEDURE — 99348 HOME/RES VST EST LOW MDM 30: CPT | Performed by: FAMILY MEDICINE

## 2024-01-31 ASSESSMENT — PATIENT HEALTH QUESTIONNAIRE - PHQ9
SUM OF ALL RESPONSES TO PHQ9 QUESTIONS 1 & 2: 0
SUM OF ALL RESPONSES TO PHQ QUESTIONS 1-9: 0
2. FEELING DOWN, DEPRESSED OR HOPELESS: 0
1. LITTLE INTEREST OR PLEASURE IN DOING THINGS: 0

## 2024-01-31 ASSESSMENT — LIFESTYLE VARIABLES
HOW MANY STANDARD DRINKS CONTAINING ALCOHOL DO YOU HAVE ON A TYPICAL DAY: PATIENT DOES NOT DRINK
HOW OFTEN DO YOU HAVE A DRINK CONTAINING ALCOHOL: NEVER

## 2024-05-09 ENCOUNTER — APPOINTMENT (OUTPATIENT)
Dept: CT IMAGING | Age: 79
DRG: 683 | End: 2024-05-09
Payer: MEDICARE

## 2024-05-09 ENCOUNTER — HOSPITAL ENCOUNTER (INPATIENT)
Age: 79
LOS: 4 days | Discharge: NURSING, REHAB OR LONG-TERM CARE | DRG: 683 | End: 2024-05-13
Attending: EMERGENCY MEDICINE | Admitting: INTERNAL MEDICINE
Payer: MEDICARE

## 2024-05-09 DIAGNOSIS — K59.09 OTHER CONSTIPATION: ICD-10-CM

## 2024-05-09 DIAGNOSIS — Z12.11 SCREENING FOR COLON CANCER: ICD-10-CM

## 2024-05-09 DIAGNOSIS — R33.9 URINARY RETENTION: Primary | ICD-10-CM

## 2024-05-09 DIAGNOSIS — N17.9 AKI (ACUTE KIDNEY INJURY) (HCC): ICD-10-CM

## 2024-05-09 DIAGNOSIS — R19.7 DIARRHEA, UNSPECIFIED TYPE: ICD-10-CM

## 2024-05-09 DIAGNOSIS — R33.8 ACUTE URINARY RETENTION: ICD-10-CM

## 2024-05-09 LAB
ALBUMIN SERPL BCG-MCNC: 4.5 G/DL (ref 3.5–5.1)
ALP SERPL-CCNC: 138 U/L (ref 38–126)
ALT SERPL W/O P-5'-P-CCNC: 14 U/L (ref 11–66)
ANION GAP SERPL CALC-SCNC: 15 MEQ/L (ref 8–16)
ANION GAP SERPL CALC-SCNC: 16 MEQ/L (ref 8–16)
AST SERPL-CCNC: 18 U/L (ref 5–40)
BASOPHILS ABSOLUTE: 0 THOU/MM3 (ref 0–0.1)
BASOPHILS NFR BLD AUTO: 0.4 %
BILIRUB CONJ SERPL-MCNC: < 0.2 MG/DL (ref 0–0.3)
BILIRUB SERPL-MCNC: 0.3 MG/DL (ref 0.3–1.2)
BILIRUB UR QL STRIP.AUTO: NEGATIVE
BUN SERPL-MCNC: 33 MG/DL (ref 7–22)
BUN SERPL-MCNC: 35 MG/DL (ref 7–22)
CALCIUM SERPL-MCNC: 9 MG/DL (ref 8.5–10.5)
CALCIUM SERPL-MCNC: 9.2 MG/DL (ref 8.5–10.5)
CHARACTER UR: CLEAR
CHLORIDE SERPL-SCNC: 107 MEQ/L (ref 98–111)
CHLORIDE SERPL-SCNC: 107 MEQ/L (ref 98–111)
CO2 SERPL-SCNC: 17 MEQ/L (ref 23–33)
CO2 SERPL-SCNC: 18 MEQ/L (ref 23–33)
COLOR: YELLOW
CREAT SERPL-MCNC: 1.6 MG/DL (ref 0.4–1.2)
CREAT SERPL-MCNC: 1.8 MG/DL (ref 0.4–1.2)
CREAT UR-MCNC: 55.2 MG/DL
DEPRECATED RDW RBC AUTO: 48.9 FL (ref 35–45)
EOSINOPHIL NFR BLD AUTO: 3.3 %
EOSINOPHIL SMEAR, URINE: NORMAL
EOSINOPHILS ABSOLUTE: 0.2 THOU/MM3 (ref 0–0.4)
ERYTHROCYTE [DISTWIDTH] IN BLOOD BY AUTOMATED COUNT: 13.2 % (ref 11.5–14.5)
GFR SERPL CREATININE-BSD FRML MDRD: 38 ML/MIN/1.73M2
GFR SERPL CREATININE-BSD FRML MDRD: 44 ML/MIN/1.73M2
GGT SERPL-CCNC: 33 U/L (ref 8–69)
GLUCOSE SERPL-MCNC: 103 MG/DL (ref 70–108)
GLUCOSE SERPL-MCNC: 94 MG/DL (ref 70–108)
GLUCOSE UR QL STRIP.AUTO: NEGATIVE MG/DL
HCT VFR BLD AUTO: 36.8 % (ref 42–52)
HGB BLD-MCNC: 12.3 GM/DL (ref 14–18)
HGB UR QL STRIP.AUTO: NEGATIVE
IMM GRANULOCYTES # BLD AUTO: 0.02 THOU/MM3 (ref 0–0.07)
IMM GRANULOCYTES NFR BLD AUTO: 0.3 %
KETONES UR QL STRIP.AUTO: NEGATIVE
LIPASE SERPL-CCNC: 25.7 U/L (ref 5.6–51.3)
LYMPHOCYTES ABSOLUTE: 1.2 THOU/MM3 (ref 1–4.8)
LYMPHOCYTES NFR BLD AUTO: 17.3 %
MCH RBC QN AUTO: 33.6 PG (ref 26–33)
MCHC RBC AUTO-ENTMCNC: 33.4 GM/DL (ref 32.2–35.5)
MCV RBC AUTO: 100.5 FL (ref 80–94)
MONOCYTES ABSOLUTE: 0.6 THOU/MM3 (ref 0.4–1.3)
MONOCYTES NFR BLD AUTO: 9.3 %
NEUTROPHILS ABSOLUTE: 4.7 THOU/MM3 (ref 1.8–7.7)
NEUTROPHILS NFR BLD AUTO: 69.4 %
NITRITE UR QL STRIP: NEGATIVE
NRBC BLD AUTO-RTO: 0 /100 WBC
OSMOLALITY SERPL CALC.SUM OF ELEC: 286.4 MOSMOL/KG (ref 275–300)
OSMOLALITY SERPL CALC.SUM OF ELEC: 287.6 MOSMOL/KG (ref 275–300)
PH UR STRIP.AUTO: 5 [PH] (ref 5–9)
PLATELET # BLD AUTO: 188 THOU/MM3 (ref 130–400)
PMV BLD AUTO: 10.2 FL (ref 9.4–12.4)
POTASSIUM SERPL-SCNC: 5 MEQ/L (ref 3.5–5.2)
POTASSIUM SERPL-SCNC: 5.2 MEQ/L (ref 3.5–5.2)
PROT SERPL-MCNC: 7.5 G/DL (ref 6.1–8)
PROT UR STRIP.AUTO-MCNC: NEGATIVE MG/DL
RBC # BLD AUTO: 3.66 MILL/MM3 (ref 4.7–6.1)
SODIUM SERPL-SCNC: 140 MEQ/L (ref 135–145)
SODIUM SERPL-SCNC: 140 MEQ/L (ref 135–145)
SODIUM UR-SCNC: 119 MEQ/L
SP GR UR REFRACT.AUTO: 1.02 (ref 1–1.03)
UROBILINOGEN, URINE: 0.2 EU/DL (ref 0–1)
WBC # BLD AUTO: 6.8 THOU/MM3 (ref 4.8–10.8)
WBC #/AREA URNS HPF: NEGATIVE /[HPF]

## 2024-05-09 PROCEDURE — 82977 ASSAY OF GGT: CPT

## 2024-05-09 PROCEDURE — 99223 1ST HOSP IP/OBS HIGH 75: CPT | Performed by: INTERNAL MEDICINE

## 2024-05-09 PROCEDURE — 74177 CT ABD & PELVIS W/CONTRAST: CPT

## 2024-05-09 PROCEDURE — 99285 EMERGENCY DEPT VISIT HI MDM: CPT

## 2024-05-09 PROCEDURE — 85025 COMPLETE CBC W/AUTO DIFF WBC: CPT

## 2024-05-09 PROCEDURE — 36415 COLL VENOUS BLD VENIPUNCTURE: CPT

## 2024-05-09 PROCEDURE — 82248 BILIRUBIN DIRECT: CPT

## 2024-05-09 PROCEDURE — 81003 URINALYSIS AUTO W/O SCOPE: CPT

## 2024-05-09 PROCEDURE — 80053 COMPREHEN METABOLIC PANEL: CPT

## 2024-05-09 PROCEDURE — 83690 ASSAY OF LIPASE: CPT

## 2024-05-09 PROCEDURE — 1200000000 HC SEMI PRIVATE

## 2024-05-09 PROCEDURE — 51702 INSERT TEMP BLADDER CATH: CPT

## 2024-05-09 PROCEDURE — 6370000000 HC RX 637 (ALT 250 FOR IP)

## 2024-05-09 PROCEDURE — 2580000003 HC RX 258

## 2024-05-09 PROCEDURE — 89190 NASAL SMEAR FOR EOSINOPHILS: CPT

## 2024-05-09 PROCEDURE — 82570 ASSAY OF URINE CREATININE: CPT

## 2024-05-09 PROCEDURE — 6360000004 HC RX CONTRAST MEDICATION: Performed by: STUDENT IN AN ORGANIZED HEALTH CARE EDUCATION/TRAINING PROGRAM

## 2024-05-09 PROCEDURE — 70450 CT HEAD/BRAIN W/O DYE: CPT

## 2024-05-09 PROCEDURE — 84300 ASSAY OF URINE SODIUM: CPT

## 2024-05-09 RX ORDER — LEVETIRACETAM 750 MG/1
750 TABLET, FILM COATED, EXTENDED RELEASE ORAL 2 TIMES DAILY
COMMUNITY

## 2024-05-09 RX ORDER — LISINOPRIL 10 MG/1
10 TABLET ORAL DAILY
Status: DISCONTINUED | OUTPATIENT
Start: 2024-05-09 | End: 2024-05-13 | Stop reason: HOSPADM

## 2024-05-09 RX ORDER — LEVETIRACETAM 500 MG/1
1500 TABLET ORAL 2 TIMES DAILY
Status: DISCONTINUED | OUTPATIENT
Start: 2024-05-09 | End: 2024-05-09 | Stop reason: DRUGHIGH

## 2024-05-09 RX ORDER — ALBUTEROL SULFATE 90 UG/1
2 AEROSOL, METERED RESPIRATORY (INHALATION) EVERY 4 HOURS PRN
COMMUNITY

## 2024-05-09 RX ORDER — LOPERAMIDE HYDROCHLORIDE 2 MG/1
2 CAPSULE ORAL 4 TIMES DAILY PRN
Status: DISCONTINUED | OUTPATIENT
Start: 2024-05-09 | End: 2024-05-09

## 2024-05-09 RX ORDER — SODIUM CHLORIDE 9 MG/ML
INJECTION, SOLUTION INTRAVENOUS CONTINUOUS
Status: DISCONTINUED | OUTPATIENT
Start: 2024-05-09 | End: 2024-05-11

## 2024-05-09 RX ORDER — PRAVASTATIN SODIUM 40 MG
40 TABLET ORAL DAILY
Status: DISCONTINUED | OUTPATIENT
Start: 2024-05-09 | End: 2024-05-13 | Stop reason: HOSPADM

## 2024-05-09 RX ORDER — GABAPENTIN 300 MG/1
300 CAPSULE ORAL DAILY
COMMUNITY

## 2024-05-09 RX ORDER — ONDANSETRON 2 MG/ML
4 INJECTION INTRAMUSCULAR; INTRAVENOUS EVERY 6 HOURS PRN
Status: DISCONTINUED | OUTPATIENT
Start: 2024-05-09 | End: 2024-05-13 | Stop reason: HOSPADM

## 2024-05-09 RX ORDER — TAMSULOSIN HYDROCHLORIDE 0.4 MG/1
0.4 CAPSULE ORAL DAILY
COMMUNITY

## 2024-05-09 RX ORDER — BISACODYL 5 MG/1
5 TABLET, DELAYED RELEASE ORAL DAILY
Status: DISCONTINUED | OUTPATIENT
Start: 2024-05-09 | End: 2024-05-12

## 2024-05-09 RX ORDER — BISACODYL 5 MG/1
5 TABLET, DELAYED RELEASE ORAL DAILY PRN
Status: DISCONTINUED | OUTPATIENT
Start: 2024-05-09 | End: 2024-05-09

## 2024-05-09 RX ORDER — TRIAMCINOLONE ACETONIDE 1 MG/G
CREAM TOPICAL 2 TIMES DAILY PRN
COMMUNITY

## 2024-05-09 RX ORDER — NAPROXEN 250 MG/1
250 TABLET ORAL 2 TIMES DAILY
Status: ON HOLD | COMMUNITY
End: 2024-05-13 | Stop reason: HOSPADM

## 2024-05-09 RX ORDER — OXCARBAZEPINE 300 MG/1
450 TABLET, FILM COATED ORAL 2 TIMES DAILY
Status: DISCONTINUED | OUTPATIENT
Start: 2024-05-09 | End: 2024-05-13 | Stop reason: HOSPADM

## 2024-05-09 RX ORDER — AMLODIPINE BESYLATE 10 MG/1
10 TABLET ORAL DAILY
Status: DISCONTINUED | OUTPATIENT
Start: 2024-05-10 | End: 2024-05-13 | Stop reason: HOSPADM

## 2024-05-09 RX ORDER — AMLODIPINE BESYLATE 5 MG/1
5 TABLET ORAL DAILY
Status: DISCONTINUED | OUTPATIENT
Start: 2024-05-09 | End: 2024-05-09 | Stop reason: SDUPTHER

## 2024-05-09 RX ORDER — MIRTAZAPINE 15 MG/1
15 TABLET, FILM COATED ORAL NIGHTLY
Status: DISCONTINUED | OUTPATIENT
Start: 2024-05-09 | End: 2024-05-13 | Stop reason: HOSPADM

## 2024-05-09 RX ORDER — ONDANSETRON 4 MG/1
4 TABLET, ORALLY DISINTEGRATING ORAL EVERY 8 HOURS PRN
Status: DISCONTINUED | OUTPATIENT
Start: 2024-05-09 | End: 2024-05-13 | Stop reason: HOSPADM

## 2024-05-09 RX ORDER — ENOXAPARIN SODIUM 100 MG/ML
40 INJECTION SUBCUTANEOUS EVERY 24 HOURS
Status: DISCONTINUED | OUTPATIENT
Start: 2024-05-09 | End: 2024-05-13 | Stop reason: HOSPADM

## 2024-05-09 RX ORDER — POLYETHYLENE GLYCOL 3350 17 G/17G
17 POWDER, FOR SOLUTION ORAL DAILY
Status: DISCONTINUED | OUTPATIENT
Start: 2024-05-09 | End: 2024-05-12

## 2024-05-09 RX ORDER — SENNA AND DOCUSATE SODIUM 50; 8.6 MG/1; MG/1
2 TABLET, FILM COATED ORAL DAILY
Status: DISCONTINUED | OUTPATIENT
Start: 2024-05-09 | End: 2024-05-10

## 2024-05-09 RX ORDER — LISINOPRIL 40 MG/1
40 TABLET ORAL NIGHTLY
COMMUNITY

## 2024-05-09 RX ORDER — PHENOL 1.4 %
1 AEROSOL, SPRAY (ML) MUCOUS MEMBRANE NIGHTLY
COMMUNITY

## 2024-05-09 RX ORDER — CYCLOBENZAPRINE HCL 5 MG
5 TABLET ORAL DAILY PRN
COMMUNITY

## 2024-05-09 RX ORDER — HYDROCHLOROTHIAZIDE 12.5 MG/1
12.5 CAPSULE, GELATIN COATED ORAL DAILY
COMMUNITY

## 2024-05-09 RX ORDER — CYCLOBENZAPRINE HCL 5 MG
5 TABLET ORAL 2 TIMES DAILY
COMMUNITY

## 2024-05-09 RX ORDER — LEVETIRACETAM 500 MG/1
500 TABLET, FILM COATED, EXTENDED RELEASE ORAL 2 TIMES DAILY
COMMUNITY

## 2024-05-09 RX ADMIN — SODIUM CHLORIDE: 9 INJECTION, SOLUTION INTRAVENOUS at 15:42

## 2024-05-09 RX ADMIN — POLYETHYLENE GLYCOL 3350 17 G: 17 POWDER, FOR SOLUTION ORAL at 20:37

## 2024-05-09 RX ADMIN — IOPAMIDOL 80 ML: 755 INJECTION, SOLUTION INTRAVENOUS at 12:46

## 2024-05-09 RX ADMIN — PRAVASTATIN SODIUM 40 MG: 40 TABLET ORAL at 20:37

## 2024-05-09 RX ADMIN — OXCARBAZEPINE 450 MG: 300 TABLET, FILM COATED ORAL at 20:35

## 2024-05-09 RX ADMIN — LEVETIRACETAM 1250 MG: 500 TABLET, FILM COATED ORAL at 20:36

## 2024-05-09 RX ADMIN — MIRTAZAPINE 15 MG: 15 TABLET, FILM COATED ORAL at 20:37

## 2024-05-09 ASSESSMENT — PAIN SCALES - GENERAL
PAINLEVEL_OUTOF10: 3
PAINLEVEL_OUTOF10: 4
PAINLEVEL_OUTOF10: 3

## 2024-05-09 ASSESSMENT — PAIN DESCRIPTION - ONSET: ONSET: ON-GOING

## 2024-05-09 ASSESSMENT — PAIN DESCRIPTION - DESCRIPTORS: DESCRIPTORS: SPASM

## 2024-05-09 ASSESSMENT — PAIN - FUNCTIONAL ASSESSMENT
PAIN_FUNCTIONAL_ASSESSMENT: ACTIVITIES ARE NOT PREVENTED
PAIN_FUNCTIONAL_ASSESSMENT: 0-10

## 2024-05-09 ASSESSMENT — PAIN DESCRIPTION - PAIN TYPE: TYPE: ACUTE PAIN

## 2024-05-09 ASSESSMENT — PAIN DESCRIPTION - ORIENTATION: ORIENTATION: LOWER

## 2024-05-09 ASSESSMENT — PAIN DESCRIPTION - DIRECTION: RADIATING_TOWARDS: UPPER ABDOMEN

## 2024-05-09 ASSESSMENT — PAIN DESCRIPTION - FREQUENCY: FREQUENCY: INTERMITTENT

## 2024-05-09 ASSESSMENT — PAIN DESCRIPTION - LOCATION: LOCATION: ABDOMEN

## 2024-05-09 ASSESSMENT — PAIN SCALES - WONG BAKER: WONGBAKER_NUMERICALRESPONSE: NO HURT

## 2024-05-09 NOTE — ED NOTES
ED to inpatient nurses report      Chief Complaint:  No chief complaint on file.    Present to ED from: [facility]    MOA:     LOC: alert and orientated to name, place, date  Mobility: Requires assistance * 1  Oxygen Baseline: 0    Current needs required: 0     Code Status:   No Order    What abnormal results were found and what did you give/do to treat them? Urinary Retention   Any procedures or intervention occur? John insertion    Mental Status:  Level of Consciousness: Alert (0)    Psych Assessment:        Vitals:  Patient Vitals for the past 24 hrs:   BP Temp Temp src Pulse Resp SpO2 Height Weight   05/09/24 1330 (!) 148/81 -- -- 90 14 99 % -- --   05/09/24 1241 (!) 153/72 -- -- 72 16 100 % -- --   05/09/24 1211 (!) 169/72 -- -- 92 16 96 % -- --   05/09/24 1155 127/72 -- -- 97 16 97 % -- --   05/09/24 1035 135/79 97.8 °F (36.6 °C) Oral 94 18 97 % 1.753 m (5' 9\") 88.5 kg (195 lb)        LDAs:   Peripheral IV 05/09/24 Right Antecubital (Active)   Site Assessment Clean, dry & intact 05/09/24 1330   Line Status Normal saline locked 05/09/24 1330   Line Care Connections checked and tightened 05/09/24 1241   Phlebitis Assessment No symptoms 05/09/24 1330   Infiltration Assessment 0 05/09/24 1330   Dressing Status Clean, dry & intact 05/09/24 1330   Dressing Type Transparent 05/09/24 1330       Ambulatory Status:  No data recorded    Diagnosis:  DISPOSITION Admitted 05/09/2024 02:02:18 PM   Final diagnoses:   BRENNA (acute kidney injury) (HCC)   Acute urinary retention        Consults:  None     Pain Score:  Pain Assessment  Pain Assessment: None - Denies Pain  Pain Level: 4    C-SSRS:   Risk of Suicide: No Risk    Sepsis Screening:  Sepsis Risk Score: 1.09    [place] Fall Risk:       Swallow Screening        Preferred Language:   English      ALLERGIES     Patient has no known allergies.    SURGICAL HISTORY       Past Surgical History:   Procedure Laterality Date    CATARACT REMOVAL      HEMORRHOID SURGERY         PAST

## 2024-05-09 NOTE — ED PROVIDER NOTES
electronically signed by Dr Lizzie Atkinson on 5/9/2024 1:10 PM      CT HEAD WO CONTRAST   Final Result      1. Stable CT scan of the brain, no interval change since previous MRI scan dated 1/24/2022.         **This report has been created using voice recognition software. It may contain minor errors which are inherent in voice recognition technology.**      Final report electronically signed by DR BELKIS LUBIN on 5/9/2024 1:08 PM          ED Medications administered this visit:   Medications   enoxaparin (LOVENOX) injection 40 mg (40 mg SubCUTAneous Not Given 5/9/24 2034)   ondansetron (ZOFRAN-ODT) disintegrating tablet 4 mg (has no administration in time range)     Or   ondansetron (ZOFRAN) injection 4 mg (has no administration in time range)   0.9 % sodium chloride infusion ( IntraVENous New Bag 5/9/24 1542)   pravastatin (PRAVACHOL) tablet 40 mg (40 mg Oral Given 5/9/24 2037)   mirtazapine (REMERON) tablet 15 mg (15 mg Oral Given 5/9/24 2037)   lisinopril (PRINIVIL;ZESTRIL) tablet 10 mg ( Oral Automatically Held 5/12/24 0900)   OXcarbazepine (TRILEPTAL) tablet 450 mg (450 mg Oral Given 5/9/24 2035)   levETIRAcetam (KEPPRA) tablet 1,250 mg (1,250 mg Oral Given 5/9/24 2036)   amLODIPine (NORVASC) tablet 10 mg (has no administration in time range)   bisacodyl (DULCOLAX) EC tablet 5 mg (5 mg Oral Not Given 5/9/24 2034)   polyethylene glycol (GLYCOLAX) packet 17 g (17 g Oral Given 5/9/24 2037)   sennosides-docusate sodium (SENOKOT-S) 8.6-50 MG tablet 2 tablet (2 tablets Oral Not Given 5/9/24 2041)   iopamidol (ISOVUE-370) 76 % injection 80 mL (80 mLs IntraVENous Given 5/9/24 1246)         MEDICAL DECISION MAKING     ED Course as of 05/09/24 2054   Th May 09, 2024   1133 Case discussed with Dr. Rollins.  He did review the MRI of the lumbar spine and did not see any pathology that could be compressing the spinal cord.  Spinal cord compressing etiology therefore lower on the differential but still remains considered.

## 2024-05-09 NOTE — ED NOTES
Report received from BRII Barahona. Pt resting in bed with family at bedside. Denies further needs at this time. Call light within reach.

## 2024-05-09 NOTE — H&P
A/O x 3. Speech normal. Answers questions appropriately. CN intact. No obvious focal neurologic deficits.  Psychiatric: Thought content / judgment / insight appear appropriate.  Capillary refill: Brisk bilaterally.  Peripheral pulses: +2 bilaterally.    Labs:   Results for orders placed or performed during the hospital encounter of 05/09/24   BMP   Result Value Ref Range    Sodium 140 135 - 145 meq/L    Potassium 5.2 3.5 - 5.2 meq/L    Chloride 107 98 - 111 meq/L    CO2 17 (L) 23 - 33 meq/L    Glucose 103 70 - 108 mg/dL    BUN 35 (H) 7 - 22 mg/dL    Creatinine 1.8 (H) 0.4 - 1.2 mg/dL    Calcium 9.2 8.5 - 10.5 mg/dL   CBC with Auto Differential   Result Value Ref Range    WBC 6.8 4.8 - 10.8 thou/mm3    RBC 3.66 (L) 4.70 - 6.10 mill/mm3    Hemoglobin 12.3 (L) 14.0 - 18.0 gm/dl    Hematocrit 36.8 (L) 42.0 - 52.0 %    .5 (H) 80.0 - 94.0 fL    MCH 33.6 (H) 26.0 - 33.0 pg    MCHC 33.4 32.2 - 35.5 gm/dl    RDW-CV 13.2 11.5 - 14.5 %    RDW-SD 48.9 (H) 35.0 - 45.0 fL    Platelets 188 130 - 400 thou/mm3    MPV 10.2 9.4 - 12.4 fL    Seg Neutrophils 69.4 %    Lymphocytes 17.3 %    Monocytes % 9.3 %    Eosinophils 3.3 %    Basophils 0.4 %    Immature Granulocytes % 0.3 %    Neutrophils Absolute 4.7 1.8 - 7.7 thou/mm3    Lymphocytes Absolute 1.2 1.0 - 4.8 thou/mm3    Monocytes Absolute 0.6 0.4 - 1.3 thou/mm3    Eosinophils Absolute 0.2 0.0 - 0.4 thou/mm3    Basophils Absolute 0.0 0.0 - 0.1 thou/mm3    Immature Grans (Abs) 0.02 0.00 - 0.07 thou/mm3    nRBC 0 /100 wbc   Hepatic Function Panel   Result Value Ref Range    Albumin 4.5 3.5 - 5.1 g/dL    Total Bilirubin 0.3 0.3 - 1.2 mg/dL    Bilirubin, Direct <0.2 0.0 - 0.3 mg/dL    Alkaline Phosphatase 138 (H) 38 - 126 U/L    AST 18 5 - 40 U/L    ALT 14 11 - 66 U/L    Total Protein 7.5 6.1 - 8.0 g/dL   Lipase   Result Value Ref Range    Lipase 25.7 5.6 - 51.3 U/L   Urinalysis with Reflex to Culture    Specimen: Urine   Result Value Ref Range    Glucose, Ur NEGATIVE NEGATIVE 
 used

## 2024-05-09 NOTE — ED TRIAGE NOTES
Pt presents to ED from Dr. Zhang office due to concern for bladder distention. Pt sister at bedside stating, \"The dr said his bladder is definitely very enlarged and he was concerned it was going to rupture. He said he needed to see urology soon. He had a urine sample done yesterday at [facility] where he lives and they did not find any infection so they did not give him anything.\" Pt notes he has become incontinent of bladder and has burning when urinating. Denies any urinary history and kidney stones. Pt states he is peeing as normal and just went before coming to room. Sister notes he has increased confusion. Pt alert and oriented x4.

## 2024-05-09 NOTE — ED NOTES
Catheter placed per order. Initial drain 2300mL. Pt in bed, eyes open, respirations even and unlabored. Vital signs reassessed, no needs voiced.

## 2024-05-09 NOTE — ED NOTES
Pt transported to 6A09 by cart in stable condition.   Called 6A and informed Kristi that the patient was on their way to the unit.

## 2024-05-09 NOTE — ED NOTES
Pt in bed, eyes open, respirations even and unlabored. Vital signs reassessed, sister at bedside. No needs voiced.

## 2024-05-10 LAB
ANION GAP SERPL CALC-SCNC: 14 MEQ/L (ref 8–16)
B-OH-BUTYR SERPL-MSCNC: 1.56 MG/DL (ref 0.2–2.81)
BUN SERPL-MCNC: 27 MG/DL (ref 7–22)
CALCIUM SERPL-MCNC: 8.5 MG/DL (ref 8.5–10.5)
CHLORIDE SERPL-SCNC: 106 MEQ/L (ref 98–111)
CO2 SERPL-SCNC: 17 MEQ/L (ref 23–33)
CREAT SERPL-MCNC: 1.3 MG/DL (ref 0.4–1.2)
DEPRECATED RDW RBC AUTO: 45.7 FL (ref 35–45)
ERYTHROCYTE [DISTWIDTH] IN BLOOD BY AUTOMATED COUNT: 12.8 % (ref 11.5–14.5)
FERRITIN SERPL IA-MCNC: 301 NG/ML (ref 22–322)
FOLATE SERPL-MCNC: > 20 NG/ML (ref 4.8–24.2)
GFR SERPL CREATININE-BSD FRML MDRD: 56 ML/MIN/1.73M2
GLUCOSE SERPL-MCNC: 93 MG/DL (ref 70–108)
HCT VFR BLD AUTO: 35.4 % (ref 42–52)
HGB BLD-MCNC: 11.9 GM/DL (ref 14–18)
IRON SATN MFR SERPL: 26 % (ref 20–50)
IRON SERPL-MCNC: 50 UG/DL (ref 65–195)
LACTATE SERPL-SCNC: 1.1 MMOL/L (ref 0.5–2)
MCH RBC QN AUTO: 32.9 PG (ref 26–33)
MCHC RBC AUTO-ENTMCNC: 33.6 GM/DL (ref 32.2–35.5)
MCV RBC AUTO: 97.8 FL (ref 80–94)
PLATELET # BLD AUTO: 193 THOU/MM3 (ref 130–400)
PMV BLD AUTO: 10.2 FL (ref 9.4–12.4)
POTASSIUM SERPL-SCNC: 5.3 MEQ/L (ref 3.5–5.2)
POTASSIUM SERPL-SCNC: 5.3 MEQ/L (ref 3.5–5.2)
RBC # BLD AUTO: 3.62 MILL/MM3 (ref 4.7–6.1)
SODIUM SERPL-SCNC: 137 MEQ/L (ref 135–145)
TIBC SERPL-MCNC: 189 UG/DL (ref 171–450)
VIT B12 SERPL-MCNC: 697 PG/ML (ref 211–911)
WBC # BLD AUTO: 5.5 THOU/MM3 (ref 4.8–10.8)

## 2024-05-10 PROCEDURE — 2580000003 HC RX 258

## 2024-05-10 PROCEDURE — 6370000000 HC RX 637 (ALT 250 FOR IP)

## 2024-05-10 PROCEDURE — 84132 ASSAY OF SERUM POTASSIUM: CPT

## 2024-05-10 PROCEDURE — 82010 KETONE BODYS QUAN: CPT

## 2024-05-10 PROCEDURE — 83550 IRON BINDING TEST: CPT

## 2024-05-10 PROCEDURE — 82746 ASSAY OF FOLIC ACID SERUM: CPT

## 2024-05-10 PROCEDURE — 85027 COMPLETE CBC AUTOMATED: CPT

## 2024-05-10 PROCEDURE — 80048 BASIC METABOLIC PNL TOTAL CA: CPT

## 2024-05-10 PROCEDURE — 99233 SBSQ HOSP IP/OBS HIGH 50: CPT | Performed by: INTERNAL MEDICINE

## 2024-05-10 PROCEDURE — 82607 VITAMIN B-12: CPT

## 2024-05-10 PROCEDURE — 83540 ASSAY OF IRON: CPT

## 2024-05-10 PROCEDURE — 82728 ASSAY OF FERRITIN: CPT

## 2024-05-10 PROCEDURE — 1200000000 HC SEMI PRIVATE

## 2024-05-10 PROCEDURE — 36415 COLL VENOUS BLD VENIPUNCTURE: CPT

## 2024-05-10 PROCEDURE — 83605 ASSAY OF LACTIC ACID: CPT

## 2024-05-10 PROCEDURE — 51798 US URINE CAPACITY MEASURE: CPT

## 2024-05-10 RX ORDER — SODIUM BICARBONATE 650 MG/1
325 TABLET ORAL 3 TIMES DAILY
Status: DISCONTINUED | OUTPATIENT
Start: 2024-05-10 | End: 2024-05-12

## 2024-05-10 RX ORDER — TAMSULOSIN HYDROCHLORIDE 0.4 MG/1
0.8 CAPSULE ORAL DAILY
Status: DISCONTINUED | OUTPATIENT
Start: 2024-05-10 | End: 2024-05-10

## 2024-05-10 RX ORDER — TAMSULOSIN HYDROCHLORIDE 0.4 MG/1
0.4 CAPSULE ORAL DAILY
Status: COMPLETED | OUTPATIENT
Start: 2024-05-10 | End: 2024-05-11

## 2024-05-10 RX ORDER — SENNA AND DOCUSATE SODIUM 50; 8.6 MG/1; MG/1
2 TABLET, FILM COATED ORAL NIGHTLY
Status: DISCONTINUED | OUTPATIENT
Start: 2024-05-11 | End: 2024-05-12

## 2024-05-10 RX ORDER — ACETAMINOPHEN 160 MG/5ML
650 LIQUID ORAL EVERY 4 HOURS PRN
Status: DISCONTINUED | OUTPATIENT
Start: 2024-05-10 | End: 2024-05-10

## 2024-05-10 RX ORDER — BISMUTH SUBSALICYLATE 262 MG/1
524 TABLET, CHEWABLE ORAL
Status: DISCONTINUED | OUTPATIENT
Start: 2024-05-11 | End: 2024-05-12

## 2024-05-10 RX ORDER — FUROSEMIDE 10 MG/ML
40 INJECTION INTRAMUSCULAR; INTRAVENOUS 2 TIMES DAILY
Status: DISCONTINUED | OUTPATIENT
Start: 2024-05-10 | End: 2024-05-11

## 2024-05-10 RX ORDER — ACETAMINOPHEN 325 MG/1
650 TABLET ORAL EVERY 4 HOURS PRN
Status: DISCONTINUED | OUTPATIENT
Start: 2024-05-10 | End: 2024-05-13 | Stop reason: HOSPADM

## 2024-05-10 RX ADMIN — SODIUM CHLORIDE: 9 INJECTION, SOLUTION INTRAVENOUS at 12:46

## 2024-05-10 RX ADMIN — OXCARBAZEPINE 450 MG: 300 TABLET, FILM COATED ORAL at 21:55

## 2024-05-10 RX ADMIN — LEVETIRACETAM 1250 MG: 500 TABLET, FILM COATED ORAL at 08:48

## 2024-05-10 RX ADMIN — TAMSULOSIN HYDROCHLORIDE 0.4 MG: 0.4 CAPSULE ORAL at 09:35

## 2024-05-10 RX ADMIN — POLYETHYLENE GLYCOL 3350 17 G: 17 POWDER, FOR SOLUTION ORAL at 08:47

## 2024-05-10 RX ADMIN — LEVETIRACETAM 1250 MG: 500 TABLET, FILM COATED ORAL at 21:55

## 2024-05-10 RX ADMIN — AMLODIPINE BESYLATE 10 MG: 10 TABLET ORAL at 08:49

## 2024-05-10 RX ADMIN — MIRTAZAPINE 15 MG: 15 TABLET, FILM COATED ORAL at 21:56

## 2024-05-10 RX ADMIN — PRAVASTATIN SODIUM 40 MG: 40 TABLET ORAL at 21:56

## 2024-05-10 RX ADMIN — SENNOSIDES AND DOCUSATE SODIUM 2 TABLET: 50; 8.6 TABLET ORAL at 08:48

## 2024-05-10 RX ADMIN — OXCARBAZEPINE 450 MG: 300 TABLET, FILM COATED ORAL at 08:48

## 2024-05-10 RX ADMIN — BISACODYL 5 MG: 5 TABLET, COATED ORAL at 08:49

## 2024-05-10 RX ADMIN — ACETAMINOPHEN 650 MG: 325 TABLET ORAL at 16:01

## 2024-05-10 RX ADMIN — SODIUM BICARBONATE 325 MG: 650 TABLET ORAL at 21:56

## 2024-05-10 RX ADMIN — SODIUM CHLORIDE: 9 INJECTION, SOLUTION INTRAVENOUS at 01:39

## 2024-05-10 RX ADMIN — ACETAMINOPHEN 650 MG: 325 TABLET ORAL at 03:20

## 2024-05-10 ASSESSMENT — PAIN SCALES - WONG BAKER: WONGBAKER_NUMERICALRESPONSE: NO HURT

## 2024-05-10 ASSESSMENT — PAIN DESCRIPTION - PAIN TYPE: TYPE: ACUTE PAIN

## 2024-05-10 ASSESSMENT — PAIN DESCRIPTION - ONSET: ONSET: ON-GOING

## 2024-05-10 ASSESSMENT — PAIN SCALES - GENERAL
PAINLEVEL_OUTOF10: 3
PAINLEVEL_OUTOF10: 7

## 2024-05-10 ASSESSMENT — PAIN - FUNCTIONAL ASSESSMENT: PAIN_FUNCTIONAL_ASSESSMENT: PREVENTS OR INTERFERES SOME ACTIVE ACTIVITIES AND ADLS

## 2024-05-10 ASSESSMENT — PAIN DESCRIPTION - ORIENTATION: ORIENTATION: LEFT;RIGHT

## 2024-05-10 ASSESSMENT — PAIN DESCRIPTION - DESCRIPTORS: DESCRIPTORS: ACHING;TIGHTNESS

## 2024-05-10 NOTE — PLAN OF CARE
Problem: Discharge Planning  Goal: Discharge to home or other facility with appropriate resources  Outcome: Progressing   SW consult received. See Sw note 5/10/24

## 2024-05-10 NOTE — CARE COORDINATION
Case Management Assessment Initial Evaluation    Date/Time of Evaluation: 5/10/2024 7:32 AM  Assessment Completed by: Elzbieta Jacinto RN    If patient is discharged prior to next notation, then this note serves as note for discharge by case management.    Patient Name: Ceferino Sanches Jr                   YOB: 1945  Diagnosis: Urinary retention [R33.9]  BRENNA (acute kidney injury) (HCC) [N17.9]  Acute urinary retention [R33.8]                   Date / Time: 2024 10:26 AM  Location:      Patient Admission Status: Inpatient   Readmission Risk Low 0-14, Mod 15-19), High > 20: Readmission Risk Score: 13    Current PCP: Jihan Lyon MD    Additional Case Management Notes: Admitted through ED after being sent there from Dr. Rollins's office due to bladder distension. John cath placed in ED and had 2.3L urine immediate return. BRENNA improved overnight. IVF.     Procedures: None    Imagin/9 CT abd/pelvis: The urinary bladder is decompressed with a John catheter and not well assessed. No obstructive uropathy is visualized. The prostate gland does not appear enlarged. Chronic findings.     Patient Goals/Plan/Treatment Preferences: Pt is from [facility]. SW consulted and full CM assessment deferred to SW.

## 2024-05-10 NOTE — CARE COORDINATION
Ceferino Sanches Jr was evaluated today and a DME order was entered for a wheeled walker with seat because he requires this to successfully complete daily living tasks of ambulating.  A wheeled walker with seat is necessary due to the patient's unsteady gait, upper body weakness, inability to  and ambulation device, ambulating only short distances by pushing a walker, and the need to sit for a short time before resuming ambulation.  These tasks cannot be completed with a lesser ambulation device such as a cane, crutch, or standard walker.  The need for this equipment was discussed with the patient and he understands and is in agreement.

## 2024-05-10 NOTE — CARE COORDINATION
5/10/24, 3:52 PM EDT  Discharge Planning Evaluation  Social work consult received, patient from [facility].   Patient/Family preference is to return to [facility].    Would patient be willing to go to a skilled facility if needed: n/a.  Is there skilled care available at current facility: yes.  Barriers to return to current living situation: none  Spoke with Brina at the facility.  Patient bed hold: yes  Anticipated transport plan: sister  Patient's Healthcare Decision Maker: Legal Next of Kin  SW met with pt and sister at bedside, pt plans to return to [facility] at discharge, sister will transport. Pt reports plans to have thomas removed today, sister reports all goes well anticipating discharge tomorrow.     SW did talk with Brina at [facility], reports pt did not use DME prior to admission. Pt aware walker is orders, pt okay to return with this. Brina understanding plans for void trial and likely discharge this weekend. Phone to them is [phone] for weekend reports and fax [phone]    Readmission Risk Low 0-14, Mod 15-19), High > 20: Readmission Risk Score: 13.3    Current PCP: Jihan Lyon MD  PCP verified by CM? Yes    Patient Orientation: Alert and Oriented, Person, Place, Situation, Self    Patient Cognition: Alert  History Provided by: Patient, Other (see comment), Child/Family ([facility])    Advance Directives:      Code Status: Full Code   Patient's Primary Decision Maker is: Legal Next of Kin    Primary Decision Maker: ALLI SAINI - Brother/Sister - [phone]     Discharge Planning:    Patient lives with:   Type of Home:    Primary Care Giver: Other (Comment) ([facility])  Patient Support Systems include: Family Members, Other (Comment) ([facility])   Current Financial resources:    Current community resources:    Current services prior to admission:

## 2024-05-11 PROBLEM — N17.9 AKI (ACUTE KIDNEY INJURY) (HCC): Status: ACTIVE | Noted: 2024-05-11

## 2024-05-11 LAB
ANION GAP SERPL CALC-SCNC: 15 MEQ/L (ref 8–16)
BUN SERPL-MCNC: 14 MG/DL (ref 7–22)
CALCIUM SERPL-MCNC: 8.9 MG/DL (ref 8.5–10.5)
CHLORIDE SERPL-SCNC: 106 MEQ/L (ref 98–111)
CO2 SERPL-SCNC: 18 MEQ/L (ref 23–33)
CREAT SERPL-MCNC: 1.1 MG/DL (ref 0.4–1.2)
DEPRECATED RDW RBC AUTO: 45.8 FL (ref 35–45)
ERYTHROCYTE [DISTWIDTH] IN BLOOD BY AUTOMATED COUNT: 13 % (ref 11.5–14.5)
GFR SERPL CREATININE-BSD FRML MDRD: 68 ML/MIN/1.73M2
GLUCOSE SERPL-MCNC: 103 MG/DL (ref 70–108)
HCT VFR BLD AUTO: 35.5 % (ref 42–52)
HGB BLD-MCNC: 12.4 GM/DL (ref 14–18)
MCH RBC QN AUTO: 33.7 PG (ref 26–33)
MCHC RBC AUTO-ENTMCNC: 34.9 GM/DL (ref 32.2–35.5)
MCV RBC AUTO: 96.5 FL (ref 80–94)
PLATELET # BLD AUTO: 219 THOU/MM3 (ref 130–400)
PMV BLD AUTO: 10.2 FL (ref 9.4–12.4)
POTASSIUM SERPL-SCNC: 4.9 MEQ/L (ref 3.5–5.2)
RBC # BLD AUTO: 3.68 MILL/MM3 (ref 4.7–6.1)
SODIUM SERPL-SCNC: 139 MEQ/L (ref 135–145)
WBC # BLD AUTO: 7.6 THOU/MM3 (ref 4.8–10.8)

## 2024-05-11 PROCEDURE — 6370000000 HC RX 637 (ALT 250 FOR IP)

## 2024-05-11 PROCEDURE — 2580000003 HC RX 258

## 2024-05-11 PROCEDURE — 51701 INSERT BLADDER CATHETER: CPT

## 2024-05-11 PROCEDURE — 51798 US URINE CAPACITY MEASURE: CPT

## 2024-05-11 PROCEDURE — 36415 COLL VENOUS BLD VENIPUNCTURE: CPT

## 2024-05-11 PROCEDURE — 51702 INSERT TEMP BLADDER CATH: CPT

## 2024-05-11 PROCEDURE — 80048 BASIC METABOLIC PNL TOTAL CA: CPT

## 2024-05-11 PROCEDURE — 85027 COMPLETE CBC AUTOMATED: CPT

## 2024-05-11 PROCEDURE — 6360000002 HC RX W HCPCS

## 2024-05-11 PROCEDURE — 99233 SBSQ HOSP IP/OBS HIGH 50: CPT | Performed by: INTERNAL MEDICINE

## 2024-05-11 PROCEDURE — 1200000000 HC SEMI PRIVATE

## 2024-05-11 RX ORDER — TAMSULOSIN HYDROCHLORIDE 0.4 MG/1
0.4 CAPSULE ORAL DAILY
Status: DISCONTINUED | OUTPATIENT
Start: 2024-05-12 | End: 2024-05-13 | Stop reason: HOSPADM

## 2024-05-11 RX ORDER — TAMSULOSIN HYDROCHLORIDE 0.4 MG/1
0.8 CAPSULE ORAL DAILY
Status: CANCELLED | OUTPATIENT
Start: 2024-05-12

## 2024-05-11 RX ORDER — TAMSULOSIN HYDROCHLORIDE 0.4 MG/1
0.4 CAPSULE ORAL DAILY
Status: DISCONTINUED | OUTPATIENT
Start: 2024-05-11 | End: 2024-05-11

## 2024-05-11 RX ORDER — TAMSULOSIN HYDROCHLORIDE 0.4 MG/1
0.4 CAPSULE ORAL ONCE
Status: CANCELLED | OUTPATIENT
Start: 2024-05-11

## 2024-05-11 RX ADMIN — ACETAMINOPHEN 650 MG: 325 TABLET ORAL at 20:00

## 2024-05-11 RX ADMIN — LEVETIRACETAM 1250 MG: 500 TABLET, FILM COATED ORAL at 20:01

## 2024-05-11 RX ADMIN — AMLODIPINE BESYLATE 10 MG: 10 TABLET ORAL at 09:59

## 2024-05-11 RX ADMIN — SODIUM BICARBONATE 325 MG: 650 TABLET ORAL at 20:01

## 2024-05-11 RX ADMIN — OXCARBAZEPINE 450 MG: 300 TABLET, FILM COATED ORAL at 20:01

## 2024-05-11 RX ADMIN — BISMUTH SUBSALICYLATE 524 MG: 262 TABLET, CHEWABLE ORAL at 11:37

## 2024-05-11 RX ADMIN — BISMUTH SUBSALICYLATE 524 MG: 262 TABLET, CHEWABLE ORAL at 00:13

## 2024-05-11 RX ADMIN — LEVETIRACETAM 1250 MG: 500 TABLET, FILM COATED ORAL at 09:59

## 2024-05-11 RX ADMIN — SODIUM BICARBONATE 325 MG: 650 TABLET ORAL at 14:00

## 2024-05-11 RX ADMIN — SODIUM BICARBONATE 325 MG: 650 TABLET ORAL at 09:59

## 2024-05-11 RX ADMIN — MIRTAZAPINE 15 MG: 15 TABLET, FILM COATED ORAL at 20:01

## 2024-05-11 RX ADMIN — OXCARBAZEPINE 450 MG: 300 TABLET, FILM COATED ORAL at 10:00

## 2024-05-11 RX ADMIN — ENOXAPARIN SODIUM 40 MG: 100 INJECTION SUBCUTANEOUS at 16:08

## 2024-05-11 RX ADMIN — TAMSULOSIN HYDROCHLORIDE 0.4 MG: 0.4 CAPSULE ORAL at 09:59

## 2024-05-11 RX ADMIN — BISMUTH SUBSALICYLATE 524 MG: 262 TABLET, CHEWABLE ORAL at 06:32

## 2024-05-11 RX ADMIN — FUROSEMIDE 40 MG: 10 INJECTION, SOLUTION INTRAMUSCULAR; INTRAVENOUS at 10:54

## 2024-05-11 RX ADMIN — BISMUTH SUBSALICYLATE 524 MG: 262 TABLET, CHEWABLE ORAL at 16:08

## 2024-05-11 RX ADMIN — SODIUM CHLORIDE: 9 INJECTION, SOLUTION INTRAVENOUS at 05:19

## 2024-05-11 RX ADMIN — BISMUTH SUBSALICYLATE 524 MG: 262 TABLET, CHEWABLE ORAL at 20:01

## 2024-05-11 RX ADMIN — PRAVASTATIN SODIUM 40 MG: 40 TABLET ORAL at 20:01

## 2024-05-11 ASSESSMENT — PAIN SCALES - GENERAL
PAINLEVEL_OUTOF10: 0
PAINLEVEL_OUTOF10: 5

## 2024-05-11 ASSESSMENT — PAIN SCALES - WONG BAKER
WONGBAKER_NUMERICALRESPONSE: NO HURT
WONGBAKER_NUMERICALRESPONSE: HURTS A LITTLE BIT
WONGBAKER_NUMERICALRESPONSE: NO HURT
WONGBAKER_NUMERICALRESPONSE: NO HURT

## 2024-05-11 ASSESSMENT — PAIN DESCRIPTION - FREQUENCY: FREQUENCY: INTERMITTENT

## 2024-05-11 ASSESSMENT — PAIN DESCRIPTION - DIRECTION: RADIATING_TOWARDS: UPPER ABDOMEN

## 2024-05-11 ASSESSMENT — PAIN DESCRIPTION - ORIENTATION: ORIENTATION: LOWER;MID

## 2024-05-11 ASSESSMENT — PAIN DESCRIPTION - ONSET: ONSET: ON-GOING

## 2024-05-11 ASSESSMENT — PAIN DESCRIPTION - DESCRIPTORS: DESCRIPTORS: ACHING;SORE

## 2024-05-11 ASSESSMENT — PAIN - FUNCTIONAL ASSESSMENT: PAIN_FUNCTIONAL_ASSESSMENT: ACTIVITIES ARE NOT PREVENTED

## 2024-05-11 ASSESSMENT — PAIN DESCRIPTION - PAIN TYPE: TYPE: ACUTE PAIN

## 2024-05-11 ASSESSMENT — PAIN DESCRIPTION - LOCATION: LOCATION: ABDOMEN

## 2024-05-11 NOTE — PROCEDURES
Bladder scan completed at 0521 and results told to Lakisha TERESA. Scan showed 701 mL in the patients bladder. Last void was 4 hours prior to scan.

## 2024-05-11 NOTE — PLAN OF CARE
Problem: Discharge Planning  Goal: Discharge to home or other facility with appropriate resources  5/11/2024 0103 by Lakisha Del Real, RN  Outcome: Progressing  Flowsheets (Taken 5/10/2024 2037)  Discharge to home or other facility with appropriate resources:   Identify barriers to discharge with patient and caregiver   Arrange for needed discharge resources and transportation as appropriate   Identify discharge learning needs (meds, wound care, etc)   Refer to discharge planning if patient needs post-hospital services based on physician order or complex needs related to functional status, cognitive ability or social support system  5/10/2024 1557 by Sabrina Frank LSW  Outcome: Progressing     Problem: Safety - Adult  Goal: Free from fall injury  Outcome: Progressing  Flowsheets (Taken 5/11/2024 0103)  Free From Fall Injury: Instruct family/caregiver on patient safety     Problem: Pain  Goal: Verbalizes/displays adequate comfort level or baseline comfort level  Outcome: Progressing  Flowsheets (Taken 5/11/2024 0103)  Verbalizes/displays adequate comfort level or baseline comfort level:   Encourage patient to monitor pain and request assistance   Administer analgesics based on type and severity of pain and evaluate response   Assess pain using appropriate pain scale   Implement non-pharmacological measures as appropriate and evaluate response     Problem: Genitourinary - Adult  Goal: Absence of urinary retention  Outcome: Progressing  Flowsheets (Taken 5/11/2024 0103)  Absence of urinary retention:   Assess patient’s ability to void and empty bladder   Monitor intake/output and perform bladder scan as needed   Care plan reviewed with patient.  Patient verbalizes understanding of the plan of care and contribute to goal setting.

## 2024-05-11 NOTE — PROCEDURES
Bladder scan completed at 2318 and results told to Lakisha TERESA. Scan showed 405 mL in the patients bladder. Last void was unknown.     normal...

## 2024-05-11 NOTE — PLAN OF CARE
Problem: Discharge Planning  Goal: Discharge to home or other facility with appropriate resources  Outcome: Progressing  Flowsheets (Taken 5/10/2024 2037 by Lakisha Del Real, RN)  Discharge to home or other facility with appropriate resources:   Identify barriers to discharge with patient and caregiver   Arrange for needed discharge resources and transportation as appropriate   Identify discharge learning needs (meds, wound care, etc)   Refer to discharge planning if patient needs post-hospital services based on physician order or complex needs related to functional status, cognitive ability or social support system     Problem: Safety - Adult  Goal: Free from fall injury  Outcome: Progressing  Flowsheets (Taken 5/11/2024 0103 by Lakisha Del Real, RN)  Free From Fall Injury: Instruct family/caregiver on patient safety     Problem: Pain  Goal: Verbalizes/displays adequate comfort level or baseline comfort level  Outcome: Progressing  Flowsheets (Taken 5/11/2024 0103 by Lakisha Del Real, RN)  Verbalizes/displays adequate comfort level or baseline comfort level:   Encourage patient to monitor pain and request assistance   Administer analgesics based on type and severity of pain and evaluate response   Assess pain using appropriate pain scale   Implement non-pharmacological measures as appropriate and evaluate response     Problem: Skin/Tissue Integrity  Goal: Absence of new skin breakdown  Description: 1.  Monitor for areas of redness and/or skin breakdown  2.  Assess vascular access sites hourly  3.  Every 4-6 hours minimum:  Change oxygen saturation probe site  4.  Every 4-6 hours:  If on nasal continuous positive airway pressure, respiratory therapy assess nares and determine need for appliance change or resting period.  Outcome: Progressing  Note: No new areas of skin breakdown.   Care plan reviewed with patient.  Patient verbalize understanding of the plan of care and contribute to goal setting.

## 2024-05-12 LAB
ANION GAP SERPL CALC-SCNC: 14 MEQ/L (ref 8–16)
BUN SERPL-MCNC: 20 MG/DL (ref 7–22)
CALCIUM SERPL-MCNC: 9.3 MG/DL (ref 8.5–10.5)
CHLORIDE SERPL-SCNC: 106 MEQ/L (ref 98–111)
CO2 SERPL-SCNC: 21 MEQ/L (ref 23–33)
CREAT SERPL-MCNC: 1.4 MG/DL (ref 0.4–1.2)
DEPRECATED RDW RBC AUTO: 46.6 FL (ref 35–45)
EKG ATRIAL RATE: 100 BPM
EKG P AXIS: 59 DEGREES
EKG P-R INTERVAL: 154 MS
EKG Q-T INTERVAL: 376 MS
EKG QRS DURATION: 90 MS
EKG QTC CALCULATION (BAZETT): 485 MS
EKG R AXIS: -7 DEGREES
EKG T AXIS: 53 DEGREES
EKG VENTRICULAR RATE: 100 BPM
ERYTHROCYTE [DISTWIDTH] IN BLOOD BY AUTOMATED COUNT: 13.1 % (ref 11.5–14.5)
GFR SERPL CREATININE-BSD FRML MDRD: 51 ML/MIN/1.73M2
GLUCOSE SERPL-MCNC: 110 MG/DL (ref 70–108)
HCT VFR BLD AUTO: 37.9 % (ref 42–52)
HGB BLD-MCNC: 13 GM/DL (ref 14–18)
MCH RBC QN AUTO: 33.4 PG (ref 26–33)
MCHC RBC AUTO-ENTMCNC: 34.3 GM/DL (ref 32.2–35.5)
MCV RBC AUTO: 97.4 FL (ref 80–94)
PLATELET # BLD AUTO: 240 THOU/MM3 (ref 130–400)
PMV BLD AUTO: 10.4 FL (ref 9.4–12.4)
POTASSIUM SERPL-SCNC: 4.6 MEQ/L (ref 3.5–5.2)
RBC # BLD AUTO: 3.89 MILL/MM3 (ref 4.7–6.1)
SODIUM SERPL-SCNC: 141 MEQ/L (ref 135–145)
WBC # BLD AUTO: 8.3 THOU/MM3 (ref 4.8–10.8)

## 2024-05-12 PROCEDURE — 2580000003 HC RX 258

## 2024-05-12 PROCEDURE — 6370000000 HC RX 637 (ALT 250 FOR IP)

## 2024-05-12 PROCEDURE — 1200000000 HC SEMI PRIVATE

## 2024-05-12 PROCEDURE — 36415 COLL VENOUS BLD VENIPUNCTURE: CPT

## 2024-05-12 PROCEDURE — 6360000002 HC RX W HCPCS

## 2024-05-12 PROCEDURE — 93010 ELECTROCARDIOGRAM REPORT: CPT | Performed by: INTERNAL MEDICINE

## 2024-05-12 PROCEDURE — 93005 ELECTROCARDIOGRAM TRACING: CPT | Performed by: INTERNAL MEDICINE

## 2024-05-12 PROCEDURE — 80048 BASIC METABOLIC PNL TOTAL CA: CPT

## 2024-05-12 PROCEDURE — 85027 COMPLETE CBC AUTOMATED: CPT

## 2024-05-12 RX ORDER — SENNOSIDES A AND B 8.6 MG/1
1 TABLET, FILM COATED ORAL NIGHTLY
Status: DISCONTINUED | OUTPATIENT
Start: 2024-05-12 | End: 2024-05-13 | Stop reason: HOSPADM

## 2024-05-12 RX ORDER — SODIUM CHLORIDE 9 MG/ML
INJECTION, SOLUTION INTRAVENOUS CONTINUOUS
Status: ACTIVE | OUTPATIENT
Start: 2024-05-12 | End: 2024-05-12

## 2024-05-12 RX ORDER — FAMOTIDINE 20 MG/1
20 TABLET, FILM COATED ORAL DAILY PRN
Status: DISCONTINUED | OUTPATIENT
Start: 2024-05-12 | End: 2024-05-13 | Stop reason: HOSPADM

## 2024-05-12 RX ORDER — POLYETHYLENE GLYCOL 3350 17 G/17G
17 POWDER, FOR SOLUTION ORAL DAILY
Status: DISCONTINUED | OUTPATIENT
Start: 2024-05-12 | End: 2024-05-13 | Stop reason: HOSPADM

## 2024-05-12 RX ADMIN — OXCARBAZEPINE 450 MG: 300 TABLET, FILM COATED ORAL at 21:06

## 2024-05-12 RX ADMIN — SODIUM CHLORIDE: 9 INJECTION, SOLUTION INTRAVENOUS at 12:14

## 2024-05-12 RX ADMIN — AMLODIPINE BESYLATE 10 MG: 10 TABLET ORAL at 09:19

## 2024-05-12 RX ADMIN — TAMSULOSIN HYDROCHLORIDE 0.4 MG: 0.4 CAPSULE ORAL at 09:19

## 2024-05-12 RX ADMIN — MIRTAZAPINE 15 MG: 15 TABLET, FILM COATED ORAL at 21:06

## 2024-05-12 RX ADMIN — POLYETHYLENE GLYCOL 3350 17 G: 17 POWDER, FOR SOLUTION ORAL at 12:17

## 2024-05-12 RX ADMIN — LEVETIRACETAM 1250 MG: 500 TABLET, FILM COATED ORAL at 21:05

## 2024-05-12 RX ADMIN — BISMUTH SUBSALICYLATE 524 MG: 262 TABLET, CHEWABLE ORAL at 05:51

## 2024-05-12 RX ADMIN — OXCARBAZEPINE 450 MG: 300 TABLET, FILM COATED ORAL at 09:19

## 2024-05-12 RX ADMIN — SODIUM BICARBONATE 325 MG: 650 TABLET ORAL at 09:15

## 2024-05-12 RX ADMIN — PRAVASTATIN SODIUM 40 MG: 40 TABLET ORAL at 21:05

## 2024-05-12 RX ADMIN — LEVETIRACETAM 1250 MG: 500 TABLET, FILM COATED ORAL at 09:18

## 2024-05-12 RX ADMIN — ENOXAPARIN SODIUM 40 MG: 100 INJECTION SUBCUTANEOUS at 16:18

## 2024-05-12 ASSESSMENT — PAIN SCALES - WONG BAKER
WONGBAKER_NUMERICALRESPONSE: HURTS A LITTLE BIT
WONGBAKER_NUMERICALRESPONSE: HURTS A LITTLE BIT
WONGBAKER_NUMERICALRESPONSE: NO HURT
WONGBAKER_NUMERICALRESPONSE: HURTS A LITTLE BIT
WONGBAKER_NUMERICALRESPONSE: NO HURT
WONGBAKER_NUMERICALRESPONSE: HURTS A LITTLE BIT
WONGBAKER_NUMERICALRESPONSE: NO HURT

## 2024-05-12 ASSESSMENT — PAIN SCALES - GENERAL
PAINLEVEL_OUTOF10: 3
PAINLEVEL_OUTOF10: 1
PAINLEVEL_OUTOF10: 2
PAINLEVEL_OUTOF10: 2

## 2024-05-12 ASSESSMENT — PAIN - FUNCTIONAL ASSESSMENT: PAIN_FUNCTIONAL_ASSESSMENT: ACTIVITIES ARE NOT PREVENTED

## 2024-05-12 ASSESSMENT — PAIN DESCRIPTION - LOCATION
LOCATION: ABDOMEN
LOCATION: ABDOMEN

## 2024-05-12 ASSESSMENT — PAIN DESCRIPTION - DESCRIPTORS: DESCRIPTORS: SORE

## 2024-05-12 ASSESSMENT — PAIN DESCRIPTION - PAIN TYPE: TYPE: ACUTE PAIN

## 2024-05-12 ASSESSMENT — PAIN DESCRIPTION - ORIENTATION
ORIENTATION: MID;LOWER
ORIENTATION: LOWER;MID

## 2024-05-12 ASSESSMENT — PAIN DESCRIPTION - ONSET: ONSET: ON-GOING

## 2024-05-12 ASSESSMENT — PAIN DESCRIPTION - FREQUENCY: FREQUENCY: INTERMITTENT

## 2024-05-12 NOTE — PLAN OF CARE
Problem: Discharge Planning  Goal: Discharge to home or other facility with appropriate resources  5/12/2024 0117 by Reena Miller, RN  Outcome: Progressing  Flowsheets (Taken 5/11/2024 2000)  Discharge to home or other facility with appropriate resources:   Identify barriers to discharge with patient and caregiver   Arrange for needed discharge resources and transportation as appropriate   Identify discharge learning needs (meds, wound care, etc)   Refer to discharge planning if patient needs post-hospital services based on physician order or complex needs related to functional status, cognitive ability or social support system  5/11/2024 1801 by Starla Bennett RN  Outcome: Progressing  Flowsheets (Taken 5/10/2024 2037 by Lakisha Del Real, RN)  Discharge to home or other facility with appropriate resources:   Identify barriers to discharge with patient and caregiver   Arrange for needed discharge resources and transportation as appropriate   Identify discharge learning needs (meds, wound care, etc)   Refer to discharge planning if patient needs post-hospital services based on physician order or complex needs related to functional status, cognitive ability or social support system     Problem: Safety - Adult  Goal: Free from fall injury  5/12/2024 0117 by Reena Miller RN  Outcome: Progressing  Flowsheets (Taken 5/11/2024 0103 by Lakisha Del Real, RN)  Free From Fall Injury: Instruct family/caregiver on patient safety  5/11/2024 1801 by Starla Bennett RN  Outcome: Progressing  Flowsheets (Taken 5/11/2024 0103 by Lakisha Del Real, RN)  Free From Fall Injury: Instruct family/caregiver on patient safety     Problem: Pain  Goal: Verbalizes/displays adequate comfort level or baseline comfort level  5/12/2024 0117 by Reena Miller, RN  Outcome: Progressing  Flowsheets (Taken 5/11/2024 1953)  Verbalizes/displays adequate comfort level or baseline comfort level:   Encourage patient to monitor pain and request

## 2024-05-13 ENCOUNTER — TELEPHONE (OUTPATIENT)
Dept: FAMILY MEDICINE CLINIC | Age: 79
End: 2024-05-13

## 2024-05-13 VITALS
TEMPERATURE: 98.4 F | RESPIRATION RATE: 19 BRPM | OXYGEN SATURATION: 98 % | SYSTOLIC BLOOD PRESSURE: 117 MMHG | HEIGHT: 69 IN | WEIGHT: 195 LBS | DIASTOLIC BLOOD PRESSURE: 86 MMHG | HEART RATE: 91 BPM | BODY MASS INDEX: 28.88 KG/M2

## 2024-05-13 LAB
ANION GAP SERPL CALC-SCNC: 15 MEQ/L (ref 8–16)
BUN SERPL-MCNC: 19 MG/DL (ref 7–22)
CALCIUM SERPL-MCNC: 8.6 MG/DL (ref 8.5–10.5)
CHLORIDE SERPL-SCNC: 109 MEQ/L (ref 98–111)
CO2 SERPL-SCNC: 19 MEQ/L (ref 23–33)
CREAT SERPL-MCNC: 1.3 MG/DL (ref 0.4–1.2)
DEPRECATED RDW RBC AUTO: 46.7 FL (ref 35–45)
ERYTHROCYTE [DISTWIDTH] IN BLOOD BY AUTOMATED COUNT: 13.2 % (ref 11.5–14.5)
GFR SERPL CREATININE-BSD FRML MDRD: 56 ML/MIN/1.73M2
GLUCOSE SERPL-MCNC: 95 MG/DL (ref 70–108)
HCT VFR BLD AUTO: 34.1 % (ref 42–52)
HGB BLD-MCNC: 11.7 GM/DL (ref 14–18)
MCH RBC QN AUTO: 33.9 PG (ref 26–33)
MCHC RBC AUTO-ENTMCNC: 34.3 GM/DL (ref 32.2–35.5)
MCV RBC AUTO: 98.8 FL (ref 80–94)
PLATELET # BLD AUTO: 204 THOU/MM3 (ref 130–400)
PMV BLD AUTO: 10.5 FL (ref 9.4–12.4)
POTASSIUM SERPL-SCNC: 4 MEQ/L (ref 3.5–5.2)
RBC # BLD AUTO: 3.45 MILL/MM3 (ref 4.7–6.1)
SODIUM SERPL-SCNC: 143 MEQ/L (ref 135–145)
WBC # BLD AUTO: 6.6 THOU/MM3 (ref 4.8–10.8)

## 2024-05-13 PROCEDURE — 80048 BASIC METABOLIC PNL TOTAL CA: CPT

## 2024-05-13 PROCEDURE — 6370000000 HC RX 637 (ALT 250 FOR IP)

## 2024-05-13 PROCEDURE — 99239 HOSP IP/OBS DSCHRG MGMT >30: CPT | Performed by: INTERNAL MEDICINE

## 2024-05-13 PROCEDURE — 36415 COLL VENOUS BLD VENIPUNCTURE: CPT

## 2024-05-13 PROCEDURE — 85027 COMPLETE CBC AUTOMATED: CPT

## 2024-05-13 RX ORDER — POLYETHYLENE GLYCOL 3350 17 G/17G
17 POWDER, FOR SOLUTION ORAL DAILY
Qty: 30 PACKET | Refills: 1 | DISCHARGE
Start: 2024-05-13

## 2024-05-13 RX ORDER — SENNOSIDES A AND B 8.6 MG/1
1 TABLET, FILM COATED ORAL NIGHTLY
Qty: 30 TABLET | Refills: 1 | DISCHARGE
Start: 2024-05-13

## 2024-05-13 RX ADMIN — LEVETIRACETAM 1250 MG: 500 TABLET, FILM COATED ORAL at 08:49

## 2024-05-13 RX ADMIN — AMLODIPINE BESYLATE 10 MG: 10 TABLET ORAL at 08:49

## 2024-05-13 RX ADMIN — TAMSULOSIN HYDROCHLORIDE 0.4 MG: 0.4 CAPSULE ORAL at 08:49

## 2024-05-13 RX ADMIN — OXCARBAZEPINE 450 MG: 300 TABLET, FILM COATED ORAL at 08:48

## 2024-05-13 NOTE — TELEPHONE ENCOUNTER
Care Transitions Initial Follow Up Call    Outreach made within 2 business days of discharge: Yes    Patient: Ceferino Sanches Jr Patient : 1945   MRN: [id number]  Reason for Admission: There are no discharge diagnoses documented for the most recent discharge.  Discharge Date: 24       Spoke with: Left message for patient to call office back.       Discharge department/facility: [facility] Interactive Patient Contact:  Was patient able to fill all prescriptions:   Was patient instructed to bring all medications to the follow-up visit:   Is patient taking all medications as directed in the discharge summary?   Does patient understand their discharge instructions:   Does patient have questions or concerns that need addressed prior to 7-14 day follow up office visit:     Scheduled appointment with PCP within 7-14 days    Follow Up  Future Appointments   Date Time Provider Department Center   2024 11:45 AM Carlos Caban MD [facility] - Yazmin Ross LPN

## 2024-05-13 NOTE — DISCHARGE INSTRUCTIONS
Please follow-up this hospitalization with urology, gastroenterology.  Home health has been ordered for continued maintenance of your John catheter.  Please follow-up this appointment with urology promptly to discuss further workup regarding your urinary retention.  Please continue to consistently take your tamsulosin (Flomax).  A BMP (blood work) has been ordered for you to be obtained in approximately 1 week's time.

## 2024-05-13 NOTE — PLAN OF CARE
Problem: Discharge Planning  Goal: Discharge to home or other facility with appropriate resources  5/13/2024 0905 by Javi Arana RN  Outcome: Progressing  5/13/2024 0905 by Javi Arana RN  Outcome: Progressing  5/13/2024 0905 by Javi Arana RN  Outcome: Progressing  5/12/2024 2306 by Mitesh Watt RN  Outcome: Progressing  Flowsheets (Taken 5/12/2024 1954)  Discharge to home or other facility with appropriate resources:   Identify barriers to discharge with patient and caregiver   Arrange for needed discharge resources and transportation as appropriate   Identify discharge learning needs (meds, wound care, etc)   Refer to discharge planning if patient needs post-hospital services based on physician order or complex needs related to functional status, cognitive ability or social support system     Problem: Safety - Adult  Goal: Free from fall injury  5/13/2024 0905 by Javi Arana RN  Outcome: Progressing  5/13/2024 0905 by Javi Arana RN  Outcome: Progressing  5/13/2024 0905 by Javi Arana RN  Outcome: Progressing  5/12/2024 2306 by Mitesh Watt RN  Outcome: Progressing  Flowsheets (Taken 5/11/2024 0103 by Lakisha Del Real, RN)  Free From Fall Injury: Instruct family/caregiver on patient safety     Problem: Pain  Goal: Verbalizes/displays adequate comfort level or baseline comfort level  5/13/2024 0905 by Javi Arana RN  Outcome: Progressing  5/13/2024 0905 by Javi Arana RN  Outcome: Progressing  5/13/2024 0905 by Javi Arana RN  Outcome: Progressing  5/12/2024 2306 by Mitesh Watt RN  Outcome: Progressing  Flowsheets (Taken 5/12/2024 1418 by Meghan Harris, RN)  Verbalizes/displays adequate comfort level or baseline comfort level:   Encourage patient to monitor pain and request assistance   Assess pain using appropriate pain scale   Administer analgesics based on type and severity of pain and evaluate response   Implement non-pharmacological measures as

## 2024-05-13 NOTE — PROGRESS NOTES
Internal Medicine  Resident Progress Note    Patient:  Ceferino Sanches Jr, 78 y.o. male  : 1945  Unit: 6A-09/009-A  MRN:  [id number]     Acct:  [id number]    PCP:  Jihan Lyon MD    Hospital Day:  2  Date of Admission:  2024    Date of Service:  24      ASSESSMENT / PLAN:   Urinary retention, medical noncompliance.  Patient on tamsulosin chronically, dispense report shows many years of continuous usage.  Patient has been discarding this medication noncompliant with it for past several months thinking was contributing to constipation.  In ED he had 2.3 L drained by John.  CT shows normal-sized prostate.  Attempted voiding trial 5/10, unsuccessful.  Tamsulosin, 0.4 mg.  Discharge with John catheter  Strict I/O  Follow-up with urology outpatient.  Hyperkalemia.  Suspect secondary to urinary retention.  Currently, asymptomatic.  No telemetry changes.  Continue to monitor with morning BMP  Metabolic acidosis, improving.  ED bicarb 17.  Unclear etiology.  No lactic acidosis, no elevated BHB.  Bicarbonate 650 mg po qid  BMP  Acute kidney injury, stage II, resolved.  Secondary to urinary retention.  ED 1.8 Cr, baseline 0.9.  FeNa 2.4%, suggestive of intrinsic vs. Post-obstructive etiology.  Positive urine eosinophils, perhaps secondary to home eslicarbazepine.  History of constipation.  No current constipation.  Seizure history.  Home Aptiom 1000 mg, Keppra 1250 mg.  Chronic ALP elevation.  GGT WNL.  Suspect pulm source, possibly secondary to osteopenia.  Osteopenia.   Right kidney 5 mm lesion, stable.  15 mm left kidney simple cyst, stable.    Chief Complaint:  Urinary retention     Interval History:   Patient was becoming agitated and confused today-unsure of reason.  Patient seems to be becoming frustrated.  Despite long conversation discussing course of therapy, rationale for medications, patient continues to confound constipation versus urinary retention.  He seems to have incorrect 
    Internal Medicine  Resident Progress Note    Patient:  Ceferino Sanches Jr, 78 y.o. male  : 1945  Unit: 6A-09/009-A  MRN:  [id number]     Acct:  [id number]    PCP:  Jihan Lyon MD    Hospital Day:  3  Date of Admission:  2024    Date of Service:  24      ASSESSMENT / PLAN:   Urinary retention, medical noncompliance.  Patient on tamsulosin chronically, dispense report shows many years of continuous usage.  Patient has been discarding this medication noncompliant with it for past several months thinking was contributing to constipation.  In ED he had 2.3 L drained by John.  CT shows normal-sized prostate.  Attempted voiding trial 5/10, unsuccessful.  Continue Flomax daily  Keep John in place, plan to discharge with John and outpatient urology follow-up for further evaluation.  May need cystoscopy outpatient.  Metabolic acidosis, improving.  ED bicarb 17.  Unclear etiology.  No lactic acidosis, no elevated BHB.  Monitor with BMP.  Sodium bicarbonate discontinued.  Acute kidney injury, stage II.  Secondary to urinary retention.  ED 1.8 Cr, baseline 0.9.  FeNa 2.4%, suggestive of intrinsic vs. Post-obstructive etiology.  Positive urine eosinophils, perhaps secondary to home eslicarbazepine.  Noted to have increasing creatinine overnight on .  Creatinine 1.4 from 1.1.  Ordered IVF however nursing having difficulty with IV placement.  Encourage patient to increase water intake.  Continue to monitor with BMP  History of constipation.  Patient was placed on aggressive bowel regimen on admission however started to have some diarrhea so bowel regimen was de-escalated.  Noting that he feels constipated on .  Discussed further with patient regarding history of his bowel movements.  He said that he has struggled with diarrhea and constipation most of his life.  He does report lactose intolerance however likes to eat milk containing products.  He does not like taking lactase.  Advised 
  1427 Pt departed the floor and was discharged via WC in stable condition. Pt and sister(Daina) were educated on hospital course and discharge instructions and medication changes - they verbalized their understanding. Pts belongings were accounted for and sent with them. Pts IV was removed w/o complications and intact.              
Patient attempted to void multiple times since straight cath, unsuccessful. Bladder scan obtained and read 701mL of urine in the bladder. This RN notified Artie Scott to inform him of bladder scan results. Straight cath ordered. This RN straight cathed patient and obtained 700mLs of michi, clear urine with some blood noted upon insertion of catheter.  
Patient unable to void with multiple attempts. This RN notified Artie Scott of this finding. Bladder scan obtained and Artie Scott informed of results. Order to straight cath obtained. Patient updated.  
Pharmacy Medication History Note      List of current medications patient is taking is complete.    Source of information: [facility]CF MAR    Changes made to medication list:  Medications removed (include reason, ex. therapy complete or physician discontinued):  Lidocaine viscous- not on ECF MAR      Medications added/doses adjusted:  Acetaminophen dose increased  Norvasc dose increased  Corrected Aptiom tablet strengths  Added frequency for Genteal  Keppra dose adjusted   Lisinopril dose increased   Added tamsulosin  Added gabapentin   Added hydrochlorothiazide  Added melatonin  Added cyclobenzaprine  Added nicotrol inhaler   Added albuterol  Added silvadene  Added triamcinolone  Added naproxen    Other notes (ex. Recent course of antibiotics, Coumadin dosing):    Denies use of other OTC or herbal medications.    Allergies reviewed    Electronically signed by Ann Marie Rodas RPH on 5/9/2024 at 4:09 PM                                                     
Pt admitted to 6A rm 9 from ED, oriented to room, call light in [facility], bed alarm on.  
Telesitter initiated due to patient confusion and high fall risk. Patient has pulled two IV's out in past 12 hours. Patient also will not stay in bed or chair without someone in the room with him. Telesitter for safety.  
This RN straight cathed patient with 600mL michi urine noted. Artie Scott informed of urine output.  
of month history of urinary retention, worsening.  He complains of urinary frequency, urgency, pain with urination suprapubically.  Patient has many year history of continually dispensed tamsulosin, however has been not taking his medication over the past several months because he thinks it has been contributing to his constipation.  He presented to the orthopedic clinic for follow-up of back pain.  Had MRI study with incidental finding of distended bladder.  Presented to ED, John catheter placed draining 2.3 L urine.  Unremarkable urinalysis.  Patient endorses continued urinary burning.     Scheduled Medications:     tamsulosin, 0.4 mg, Oral, Daily    enoxaparin, 40 mg, SubCUTAneous, Q24H    pravastatin, 40 mg, Oral, Daily    mirtazapine, 15 mg, Oral, Nightly    [Held by provider] lisinopril, 10 mg, Oral, Daily    OXcarbazepine, 450 mg, Oral, BID    levETIRAcetam, 1,250 mg, Oral, BID    amLODIPine, 10 mg, Oral, Daily    bisacodyl, 5 mg, Oral, Daily    polyethylene glycol, 17 g, Oral, Daily    sennosides-docusate sodium, 2 tablet, Oral, Daily     PHYSICAL EXAM:   Vitals:    05/09/24 2322 05/10/24 0315 05/10/24 0846 05/10/24 1104   BP: (!) 155/85 (!) 151/72 (!) 158/81 (!) 158/70   Pulse: 85 86 82 79   Resp: 16 18 18 18   Temp: 97.7 °F (36.5 °C) 97.8 °F (36.6 °C) 97.9 °F (36.6 °C) 98.2 °F (36.8 °C)   TempSrc: Oral Oral Oral Oral   SpO2: 97% 96% 97% 97%   Weight:       Height:         General appearance: Alert / well-appearing male. Cooperative. NAD.  HEENT:  Normocephalic / atraumatic. PERRL. EOM intact. Conjunctivae appear normal.  Neck: Supple. No JVD.  Respiratory: Normal respiratory effort on RA. CTAB. No wheezes / rales / rhonchi.  Cardiovascular: RRR. Normal S1/S2. No murmurs / rubs / gallops.  Abdomen: Soft / non-tender / non-distended. BS present.  John catheter in place.  Musculoskeletal: No cyanosis or edema.  Skin: Warm / dry. Normal turgor.  Neurologic: A/O x 3. Speech normal. Answers questions

## 2024-05-13 NOTE — DISCHARGE SUMMARY
6:39 AM   Result Value Ref Range    Est, Glom Filt Rate 68 >60 ml/min/1.73m2   CBC    Collection Time: 05/12/24  7:03 AM   Result Value Ref Range    WBC 8.3 4.8 - 10.8 thou/mm3    RBC 3.89 (L) 4.70 - 6.10 mill/mm3    Hemoglobin 13.0 (L) 14.0 - 18.0 gm/dl    Hematocrit 37.9 (L) 42.0 - 52.0 %    MCV 97.4 (H) 80.0 - 94.0 fL    MCH 33.4 (H) 26.0 - 33.0 pg    MCHC 34.3 32.2 - 35.5 gm/dl    RDW-CV 13.1 11.5 - 14.5 %    RDW-SD 46.6 (H) 35.0 - 45.0 fL    Platelets 240 130 - 400 thou/mm3    MPV 10.4 9.4 - 12.4 fL   Basic Metabolic Panel    Collection Time: 05/12/24  7:03 AM   Result Value Ref Range    Sodium 141 135 - 145 meq/L    Potassium 4.6 3.5 - 5.2 meq/L    Chloride 106 98 - 111 meq/L    CO2 21 (L) 23 - 33 meq/L    Glucose 110 (H) 70 - 108 mg/dL    BUN 20 7 - 22 mg/dL    Creatinine 1.4 (H) 0.4 - 1.2 mg/dL    Calcium 9.3 8.5 - 10.5 mg/dL   Anion Gap    Collection Time: 05/12/24  7:03 AM   Result Value Ref Range    Anion Gap 14.0 8.0 - 16.0 meq/L   Glomerular Filtration Rate, Estimated    Collection Time: 05/12/24  7:03 AM   Result Value Ref Range    Est, Glom Filt Rate 51 (A) >60 ml/min/1.73m2   EKG 12 Lead    Collection Time: 05/12/24  3:36 PM   Result Value Ref Range    Ventricular Rate 100 BPM    Atrial Rate 100 BPM    P-R Interval 154 ms    QRS Duration 90 ms    Q-T Interval 376 ms    QTc Calculation (Bazett) 485 ms    P Axis 59 degrees    R Axis -7 degrees    T Axis 53 degrees   CBC    Collection Time: 05/13/24  6:06 AM   Result Value Ref Range    WBC 6.6 4.8 - 10.8 thou/mm3    RBC 3.45 (L) 4.70 - 6.10 mill/mm3    Hemoglobin 11.7 (L) 14.0 - 18.0 gm/dl    Hematocrit 34.1 (L) 42.0 - 52.0 %    MCV 98.8 (H) 80.0 - 94.0 fL    MCH 33.9 (H) 26.0 - 33.0 pg    MCHC 34.3 32.2 - 35.5 gm/dl    RDW-CV 13.2 11.5 - 14.5 %    RDW-SD 46.7 (H) 35.0 - 45.0 fL    Platelets 204 130 - 400 thou/mm3    MPV 10.5 9.4 - 12.4 fL   Basic Metabolic Panel    Collection Time: 05/13/24  6:06 AM   Result Value Ref Range    Sodium 143 135 - 145

## 2024-05-13 NOTE — CARE COORDINATION
5/13/24, 1:12 PM EDT    Patient goals/plan/ treatment preferences discussed by  and .  Patient goals/plan/ treatment preferences reviewed with patient/ family.  Patient/ family verbalize understanding of discharge plan and are in agreement with goal/plan/treatment preferences.  Understanding was demonstrated using the teach back method.  AVS provided by RN at time of discharge, which includes all necessary medical information pertaining to the patients current course of illness, treatment, post-discharge goals of care, and treatment preferences.     Services At/After Discharge[facility]    Spoke with CHANELL, anticipating discharge today, CHANELL did set up [facility]. Call to [facility], updated on planning discharge today, pt continues with John and [facility] being set up.

## 2024-05-13 NOTE — CARE COORDINATION
Discharge Planning Update:     Hospital day: 4  Location: 6A-09/009-A     Barriers to Discharge:  Spoke with pt and sister regarding HH orders. Offered HH list, declined. [facility] to [facility], message left.     SIGNED:  Elzbieta Jacinto RN   5/13/2024, 12:18 PM

## 2024-05-13 NOTE — PLAN OF CARE
Problem: Discharge Planning  Goal: Discharge to home or other facility with appropriate resources  5/12/2024 2306 by Mitesh Watt RN  Outcome: Progressing  Flowsheets (Taken 5/12/2024 1954)  Discharge to home or other facility with appropriate resources:   Identify barriers to discharge with patient and caregiver   Arrange for needed discharge resources and transportation as appropriate   Identify discharge learning needs (meds, wound care, etc)   Refer to discharge planning if patient needs post-hospital services based on physician order or complex needs related to functional status, cognitive ability or social support system  5/12/2024 1418 by Meghan Harris RN  Outcome: Progressing  Flowsheets (Taken 5/12/2024 1418)  Discharge to home or other facility with appropriate resources:   Identify barriers to discharge with patient and caregiver   Arrange for needed discharge resources and transportation as appropriate   Identify discharge learning needs (meds, wound care, etc)  5/12/2024 1417 by Meghan Harris RN  Outcome: Progressing     Problem: Safety - Adult  Goal: Free from fall injury  5/12/2024 2306 by Mitesh Watt RN  Outcome: Progressing  Flowsheets (Taken 5/11/2024 0103 by Lakisha Del Real, RN)  Free From Fall Injury: Instruct family/caregiver on patient safety  5/12/2024 1418 by Meghan Harris RN  Outcome: Progressing  Flowsheets (Taken 5/11/2024 0103 by Lakisha Del Real, RN)  Free From Fall Injury: Instruct family/caregiver on patient safety  5/12/2024 1417 by Meghan Harris RN  Outcome: Progressing     Problem: Pain  Goal: Verbalizes/displays adequate comfort level or baseline comfort level  5/12/2024 2306 by Mitesh Watt RN  Outcome: Progressing  Flowsheets (Taken 5/12/2024 1418 by Meghan Harris RN)  Verbalizes/displays adequate comfort level or baseline comfort level:   Encourage patient to monitor pain and request assistance   Assess pain using

## 2024-05-14 ENCOUNTER — HOSPITAL ENCOUNTER (EMERGENCY)
Age: 79
Discharge: HOME | End: 2024-05-14
Attending: EMERGENCY MEDICINE
Payer: MEDICARE

## 2024-05-14 VITALS
SYSTOLIC BLOOD PRESSURE: 143 MMHG | DIASTOLIC BLOOD PRESSURE: 79 MMHG | RESPIRATION RATE: 16 BRPM | HEART RATE: 90 BPM | TEMPERATURE: 98.2 F | OXYGEN SATURATION: 94 %

## 2024-05-14 DIAGNOSIS — T83.9XXA PROBLEM WITH FOLEY CATHETER, INITIAL ENCOUNTER (HCC): Primary | ICD-10-CM

## 2024-05-14 LAB
AMORPH SED URNS QL MICRO: ABNORMAL
BACTERIA URNS QL MICRO: ABNORMAL /HPF
BILIRUB UR QL STRIP.AUTO: NEGATIVE
CASTS #/AREA URNS LPF: ABNORMAL /LPF
CASTS 2: ABNORMAL /LPF
CHARACTER UR: ABNORMAL
COLOR: YELLOW
CRYSTALS URNS MICRO: ABNORMAL
EPITHELIAL CELLS, UA: ABNORMAL /HPF
GLUCOSE UR QL STRIP.AUTO: NEGATIVE MG/DL
HGB UR QL STRIP.AUTO: ABNORMAL
KETONES UR QL STRIP.AUTO: NEGATIVE
MISCELLANEOUS 2: ABNORMAL
NITRITE UR QL STRIP: NEGATIVE
PH UR STRIP.AUTO: 5 [PH] (ref 5–9)
PROT UR STRIP.AUTO-MCNC: 300 MG/DL
RBC URINE: > 100 /HPF
RENAL EPI CELLS #/AREA URNS HPF: ABNORMAL /[HPF]
SP GR UR REFRACT.AUTO: 1.02 (ref 1–1.03)
UROBILINOGEN, URINE: 1 EU/DL (ref 0–1)
WBC #/AREA URNS HPF: ABNORMAL /HPF
WBC #/AREA URNS HPF: ABNORMAL /[HPF]
YEAST LIKE FUNGI URNS QL MICRO: ABNORMAL

## 2024-05-14 PROCEDURE — 99283 EMERGENCY DEPT VISIT LOW MDM: CPT

## 2024-05-14 PROCEDURE — 81001 URINALYSIS AUTO W/SCOPE: CPT

## 2024-05-14 PROCEDURE — 51702 INSERT TEMP BLADDER CATH: CPT

## 2024-05-14 PROCEDURE — 87086 URINE CULTURE/COLONY COUNT: CPT

## 2024-05-14 ASSESSMENT — PAIN DESCRIPTION - LOCATION: LOCATION: PENIS

## 2024-05-14 ASSESSMENT — PAIN SCALES - GENERAL: PAINLEVEL_OUTOF10: 5

## 2024-05-14 ASSESSMENT — PAIN - FUNCTIONAL ASSESSMENT: PAIN_FUNCTIONAL_ASSESSMENT: 0-10

## 2024-05-14 NOTE — DISCHARGE INSTRUCTIONS
You were seen for a displaced John catheter.  This has been replaced here in the emergency department.  Please continue all home medications as prescribed.  Follow-up with your primary care physician as needed.

## 2024-05-14 NOTE — ED NOTES
Nahum, aid from [facility], called stating, \"I am 99% sure he pulled it out, then lied about it to us. I went in to empty it and it wasn't there. When I asked him about it, he said he didn't know it was there when he went to sleep. I looked and looked for it then I found it in his other bedroom on the desk. There was some blood. I am just concerned if he did any damage, he's only been back to us for 12 hours and he knows that he needs the catheter. I am also worried this is going to become and issue.\"

## 2024-05-14 NOTE — ED PROVIDER NOTES
Comorbid Conditions:    Urinary retention, hypertension, seizure disorder    2)  Data Reviewed (none if left blank)          My Independent interpretations:       Labs:      Urinalysis shows greater than 100 red blood cells.  There are some WBCs and only a few bacteria.  Will send out for culture prior to giving antibiotics.           External Documentation Reviewed:         Previous patient encounter documents & history available on EMR was reviewed              See Formal Diagnostic Results above for the lab and radiology tests and orders.    3)  Treatment and Disposition           Shared Decision-Making was performed and disposition discussed with the        Patient/Family and questions answered          Social determinants of health impacting treatment or disposition: Nursing home patient         Code Status: Full      Summary of Patient Presentation:      MDM  Number of Diagnoses or Management Options  Problem with John catheter, initial encounter (HCC)  Diagnosis management comments: 78-year-old male presents emergency room for John catheter replacement.  This was done by the nurse.  Will evaluate with urinalysis just to ensure there is no significant infection and we will also send for culture.    /   Vitals Reviewed:    Vitals:    05/14/24 0131 05/14/24 0227 05/14/24 0311   BP: 138/81 (!) 161/65 (!) 143/79   Pulse: 95 92 90   Resp: 16 16 16   Temp: 98.2 °F (36.8 °C)     TempSrc: Oral     SpO2: 95% 94% 94%       The patient was seen and examined. Appropriate diagnostic testing was performed and results reviewed with the patient.      The results of pertinent diagnostic studies and exam findings were discussed. The patient’s provisional diagnosis and plan of care were discussed with the patient and present family who expressed understanding. Any medications were reviewed and indications and risks of medications were discussed with the patient /family present. Strict verbal and written return precautions,

## 2024-05-14 NOTE — ED TRIAGE NOTES
Pt presents to ED due to needing a catheter replacement. Facility states that they went in to drain it and it was not there and the pt stated, \"it just went missing.\" Facility states that patient pulled it out. Pt alert and oriented x4.

## 2024-05-14 NOTE — TELEPHONE ENCOUNTER
Care Transitions Initial Follow Up Call    Outreach made within 2 business days of discharge: Yes    Patient: Ceferino Sanches Jr Patient : 1945   MRN: [id number]  Reason for Admission: There are no discharge diagnoses documented for the most recent discharge.  Discharge Date: 24       Spoke with:      Discharge department/facility: [facility] Interactive Patient Contact:  Was patient able to fill all prescriptions: Yes  Was patient instructed to bring all medications to the follow-up visit: Yes  Is patient taking all medications as directed in the discharge summary? Yes  Does patient understand their discharge instructions: Yes  Does patient have questions or concerns that need addressed prior to 7-14 day follow up office visit: no    Scheduled appointment with PCP within 7-14 days    Follow Up  Future Appointments   Date Time Provider Department Center   2024 11:45 AM Carlos Caban MD [facility] Yazmin Calvillo CMA ([facility])

## 2024-05-14 NOTE — ED NOTES
Catheter placed and draining appropriately. Pt tolerated well. Pt in bed, eyes open, respirations even and unlabored. Vital signs reassessed, no needs voiced.

## 2024-05-15 LAB
BACTERIA UR CULT: ABNORMAL
ORGANISM: ABNORMAL

## 2024-06-28 ENCOUNTER — HOSPITAL ENCOUNTER (INPATIENT)
Age: 79
LOS: 5 days | Discharge: OTHER INSTITUTION | DRG: 092 | End: 2024-07-03
Attending: FAMILY MEDICINE | Admitting: HOSPITALIST
Payer: MEDICARE

## 2024-06-28 ENCOUNTER — APPOINTMENT (OUTPATIENT)
Dept: MRI IMAGING | Age: 79
DRG: 092 | End: 2024-06-28
Payer: MEDICARE

## 2024-06-28 ENCOUNTER — APPOINTMENT (OUTPATIENT)
Dept: CT IMAGING | Age: 79
DRG: 092 | End: 2024-06-28
Payer: MEDICARE

## 2024-06-28 ENCOUNTER — APPOINTMENT (OUTPATIENT)
Dept: ULTRASOUND IMAGING | Age: 79
DRG: 092 | End: 2024-06-28
Payer: MEDICARE

## 2024-06-28 DIAGNOSIS — R01.1 HEART MURMUR: ICD-10-CM

## 2024-06-28 DIAGNOSIS — S92.902A CLOSED FRACTURE OF LEFT FOOT, INITIAL ENCOUNTER: ICD-10-CM

## 2024-06-28 DIAGNOSIS — N17.9 ACUTE RENAL FAILURE, UNSPECIFIED ACUTE RENAL FAILURE TYPE (HCC): ICD-10-CM
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
## 2024-06-28 DIAGNOSIS — S09.90XA CLOSED HEAD INJURY, INITIAL ENCOUNTER: ICD-10-CM

## 2024-06-28 DIAGNOSIS — E87.5 HYPERKALEMIA: Primary | ICD-10-CM

## 2024-06-28 DIAGNOSIS — R29.898 WEAKNESS OF BOTH LOWER EXTREMITIES: ICD-10-CM

## 2024-06-28 DIAGNOSIS — E87.20 METABOLIC ACIDOSIS: ICD-10-CM

## 2024-06-28 DIAGNOSIS — R20.2 PARESTHESIAS: ICD-10-CM

## 2024-06-28 LAB
ALBUMIN SERPL BCG-MCNC: 4.5 G/DL (ref 3.5–5.1)
ALP SERPL-CCNC: 142 U/L (ref 38–126)
ALT SERPL W/O P-5'-P-CCNC: 17 U/L (ref 11–66)
ANION GAP SERPL CALC-SCNC: 15 MEQ/L (ref 8–16)
ANION GAP SERPL CALC-SCNC: 16 MEQ/L (ref 8–16)
AST SERPL-CCNC: 24 U/L (ref 5–40)
BACTERIA: ABNORMAL
BASE EXCESS BLDA CALC-SCNC: -10.7 MMOL/L (ref -2–3)
BASOPHILS ABSOLUTE: 0 THOU/MM3 (ref 0–0.1)
BASOPHILS NFR BLD AUTO: 0.4 %
BILIRUB SERPL-MCNC: < 0.2 MG/DL (ref 0.3–1.2)
BILIRUB UR QL STRIP: NEGATIVE
BUN SERPL-MCNC: 46 MG/DL (ref 7–22)
BUN SERPL-MCNC: 48 MG/DL (ref 7–22)
CALCIUM SERPL-MCNC: 8.6 MG/DL (ref 8.5–10.5)
CALCIUM SERPL-MCNC: 9.3 MG/DL (ref 8.5–10.5)
CASTS #/AREA URNS LPF: ABNORMAL /LPF
CHARACTER UR: ABNORMAL
CHLORIDE 24H UR-SRATE: 50 MEQ/L
CHLORIDE SERPL-SCNC: 106 MEQ/L (ref 98–111)
CHLORIDE SERPL-SCNC: 108 MEQ/L (ref 98–111)
CO2 SERPL-SCNC: 14 MEQ/L (ref 23–33)
CO2 SERPL-SCNC: 14 MEQ/L (ref 23–33)
COLLECTED BY:: ABNORMAL
COLOR UR: YELLOW
CREAT SERPL-MCNC: 3.4 MG/DL (ref 0.4–1.2)
CREAT SERPL-MCNC: 4.2 MG/DL (ref 0.4–1.2)
CREAT UR-MCNC: 112 MG/DL
CRYSTALS URNS QL MICRO: ABNORMAL
DEPRECATED RDW RBC AUTO: 50.6 FL (ref 35–45)
DEVICE: ABNORMAL
EKG ATRIAL RATE: 98 BPM
EKG P AXIS: 60 DEGREES
EKG P-R INTERVAL: 228 MS
EKG Q-T INTERVAL: 362 MS
EKG QRS DURATION: 132 MS
EKG QTC CALCULATION (BAZETT): 462 MS
EKG R AXIS: -57 DEGREES
EKG T AXIS: 50 DEGREES
EKG VENTRICULAR RATE: 98 BPM
EOSINOPHIL NFR BLD AUTO: 1.4 %
EOSINOPHILS ABSOLUTE: 0.2 THOU/MM3 (ref 0–0.4)
EPITHELIAL CELLS, UA: ABNORMAL /HPF
ERYTHROCYTE [DISTWIDTH] IN BLOOD BY AUTOMATED COUNT: 13.5 % (ref 11.5–14.5)
GFR SERPL CREATININE-BSD FRML MDRD: 14 ML/MIN/1.73M2
GFR SERPL CREATININE-BSD FRML MDRD: 18 ML/MIN/1.73M2
GLUCOSE SERPL-MCNC: 120 MG/DL (ref 70–108)
GLUCOSE SERPL-MCNC: 94 MG/DL (ref 70–108)
GLUCOSE UR QL STRIP.AUTO: NEGATIVE MG/DL
HCO3 BLDA-SCNC: 14 MMOL/L (ref 23–28)
HCT VFR BLD AUTO: 37.3 % (ref 42–52)
HGB BLD-MCNC: 11.8 GM/DL (ref 14–18)
HGB UR QL STRIP.AUTO: ABNORMAL
IMM GRANULOCYTES # BLD AUTO: 0.04 THOU/MM3 (ref 0–0.07)
IMM GRANULOCYTES NFR BLD AUTO: 0.4 %
INR PPP: 1.04 (ref 0.85–1.13)
KETONES UR QL STRIP.AUTO: NEGATIVE
LACTATE SERPL-SCNC: 1 MMOL/L (ref 0.5–2)
LEUKOCYTE ESTERASE UR QL STRIP.AUTO: ABNORMAL
LYMPHOCYTES ABSOLUTE: 1 THOU/MM3 (ref 1–4.8)
LYMPHOCYTES NFR BLD AUTO: 8.7 %
MAGNESIUM SERPL-MCNC: 2.7 MG/DL (ref 1.6–2.4)
MCH RBC QN AUTO: 32.2 PG (ref 26–33)
MCHC RBC AUTO-ENTMCNC: 31.6 GM/DL (ref 32.2–35.5)
MCV RBC AUTO: 101.6 FL (ref 80–94)
MONOCYTES ABSOLUTE: 1 THOU/MM3 (ref 0.4–1.3)
MONOCYTES NFR BLD AUTO: 9.2 %
MUCOUS THREADS URNS QL MICRO: ABNORMAL
NEUTROPHILS ABSOLUTE: 8.9 THOU/MM3 (ref 1.8–7.7)
NEUTROPHILS NFR BLD AUTO: 79.9 %
NITRITE UR QL STRIP.AUTO: NEGATIVE
NRBC BLD AUTO-RTO: 0 /100 WBC
OSMOLALITY SERPL CALC.SUM OF ELEC: 282.5 MOSMOL/KG (ref 275–300)
OSMOLALITY UR: 386 MOSMOL/KG (ref 250–750)
PCO2 TEMP ADJ BLDMV: 27 MMHG (ref 41–51)
PH BLDMV: 7.34 [PH] (ref 7.31–7.41)
PH UR STRIP.AUTO: 5 [PH] (ref 5–9)
PLATELET # BLD AUTO: 203 THOU/MM3 (ref 130–400)
PMV BLD AUTO: 10.2 FL (ref 9.4–12.4)
PO2 BLDMV: 105 MMHG (ref 25–40)
POTASSIUM SERPL-SCNC: 5.9 MEQ/L (ref 3.5–5.2)
POTASSIUM SERPL-SCNC: 6.3 MEQ/L (ref 3.5–5.2)
POTASSIUM SERPL-SCNC: 7.9 MEQ/L (ref 3.5–5.2)
POTASSIUM UR-SCNC: 35.5 MEQ/L
PROT SERPL-MCNC: 7.2 G/DL (ref 6.1–8)
PROT UR STRIP.AUTO-MCNC: 100 MG/DL
RBC # BLD AUTO: 3.67 MILL/MM3 (ref 4.7–6.1)
RBC #/AREA URNS HPF: > 100 /HPF
SAO2 % BLDMV: 98 %
SITE: ABNORMAL
SODIUM SERPL-SCNC: 135 MEQ/L (ref 135–145)
SODIUM SERPL-SCNC: 138 MEQ/L (ref 135–145)
SODIUM UR-SCNC: 70 MEQ/L
SPECIFIC GRAVITY UA: 1.02 (ref 1–1.03)
T4 FREE SERPL-MCNC: 0.8 NG/DL (ref 0.93–1.68)
TROPONIN, HIGH SENSITIVITY: 29 NG/L (ref 0–12)
TROPONIN, HIGH SENSITIVITY: 36 NG/L (ref 0–12)
TSH SERPL DL<=0.005 MIU/L-ACNC: 0.34 UIU/ML (ref 0.4–4.2)
UROBILINOGEN, URINE: 0.2 EU/DL (ref 0–1)
UUN 24H UR-MCNC: 399 MG/DL
VENTILATION MODE VENT: ABNORMAL
WBC # BLD AUTO: 11.2 THOU/MM3 (ref 4.8–10.8)
WBC #/AREA URNS HPF: > 200 /HPF
YEAST LIKE FUNGI URNS QL MICRO: ABNORMAL

## 2024-06-28 PROCEDURE — 84300 ASSAY OF URINE SODIUM: CPT

## 2024-06-28 PROCEDURE — 84132 ASSAY OF SERUM POTASSIUM: CPT

## 2024-06-28 PROCEDURE — 2500000003 HC RX 250 WO HCPCS: Performed by: FAMILY MEDICINE

## 2024-06-28 PROCEDURE — 2580000003 HC RX 258: Performed by: INTERNAL MEDICINE

## 2024-06-28 PROCEDURE — 36415 COLL VENOUS BLD VENIPUNCTURE: CPT

## 2024-06-28 PROCEDURE — 6360000002 HC RX W HCPCS: Performed by: STUDENT IN AN ORGANIZED HEALTH CARE EDUCATION/TRAINING PROGRAM

## 2024-06-28 PROCEDURE — 82436 ASSAY OF URINE CHLORIDE: CPT

## 2024-06-28 PROCEDURE — 81001 URINALYSIS AUTO W/SCOPE: CPT

## 2024-06-28 PROCEDURE — 84133 ASSAY OF URINE POTASSIUM: CPT

## 2024-06-28 PROCEDURE — 80053 COMPREHEN METABOLIC PANEL: CPT

## 2024-06-28 PROCEDURE — 83605 ASSAY OF LACTIC ACID: CPT

## 2024-06-28 PROCEDURE — 6370000000 HC RX 637 (ALT 250 FOR IP): Performed by: STUDENT IN AN ORGANIZED HEALTH CARE EDUCATION/TRAINING PROGRAM

## 2024-06-28 PROCEDURE — 72131 CT LUMBAR SPINE W/O DYE: CPT

## 2024-06-28 PROCEDURE — 83735 ASSAY OF MAGNESIUM: CPT

## 2024-06-28 PROCEDURE — 82570 ASSAY OF URINE CREATININE: CPT

## 2024-06-28 PROCEDURE — 99223 1ST HOSP IP/OBS HIGH 75: CPT | Performed by: INTERNAL MEDICINE

## 2024-06-28 PROCEDURE — 84540 ASSAY OF URINE/UREA-N: CPT

## 2024-06-28 PROCEDURE — 6360000002 HC RX W HCPCS: Performed by: INTERNAL MEDICINE

## 2024-06-28 PROCEDURE — 70551 MRI BRAIN STEM W/O DYE: CPT

## 2024-06-28 PROCEDURE — 84439 ASSAY OF FREE THYROXINE: CPT

## 2024-06-28 PROCEDURE — 93010 ELECTROCARDIOGRAM REPORT: CPT | Performed by: NUCLEAR MEDICINE

## 2024-06-28 PROCEDURE — 2580000003 HC RX 258: Performed by: FAMILY MEDICINE

## 2024-06-28 PROCEDURE — 84443 ASSAY THYROID STIM HORMONE: CPT

## 2024-06-28 PROCEDURE — 6370000000 HC RX 637 (ALT 250 FOR IP): Performed by: FAMILY MEDICINE

## 2024-06-28 PROCEDURE — 72128 CT CHEST SPINE W/O DYE: CPT

## 2024-06-28 PROCEDURE — 76770 US EXAM ABDO BACK WALL COMP: CPT

## 2024-06-28 PROCEDURE — 87106 FUNGI IDENTIFICATION YEAST: CPT

## 2024-06-28 PROCEDURE — 85610 PROTHROMBIN TIME: CPT

## 2024-06-28 PROCEDURE — 72125 CT NECK SPINE W/O DYE: CPT

## 2024-06-28 PROCEDURE — 83935 ASSAY OF URINE OSMOLALITY: CPT

## 2024-06-28 PROCEDURE — 51702 INSERT TEMP BLADDER CATH: CPT

## 2024-06-28 PROCEDURE — 85025 COMPLETE CBC W/AUTO DIFF WBC: CPT

## 2024-06-28 PROCEDURE — 2500000003 HC RX 250 WO HCPCS: Performed by: INTERNAL MEDICINE

## 2024-06-28 PROCEDURE — 96374 THER/PROPH/DIAG INJ IV PUSH: CPT

## 2024-06-28 PROCEDURE — 51798 US URINE CAPACITY MEASURE: CPT

## 2024-06-28 PROCEDURE — 2580000003 HC RX 258: Performed by: STUDENT IN AN ORGANIZED HEALTH CARE EDUCATION/TRAINING PROGRAM

## 2024-06-28 PROCEDURE — 93005 ELECTROCARDIOGRAM TRACING: CPT | Performed by: FAMILY MEDICINE

## 2024-06-28 PROCEDURE — 84484 ASSAY OF TROPONIN QUANT: CPT

## 2024-06-28 PROCEDURE — 6370000000 HC RX 637 (ALT 250 FOR IP): Performed by: INTERNAL MEDICINE

## 2024-06-28 PROCEDURE — 6370000000 HC RX 637 (ALT 250 FOR IP)

## 2024-06-28 PROCEDURE — 93005 ELECTROCARDIOGRAM TRACING: CPT | Performed by: HOSPITALIST

## 2024-06-28 PROCEDURE — 2140000000 HC CCU INTERMEDIATE R&B

## 2024-06-28 PROCEDURE — 70450 CT HEAD/BRAIN W/O DYE: CPT

## 2024-06-28 PROCEDURE — 99285 EMERGENCY DEPT VISIT HI MDM: CPT

## 2024-06-28 PROCEDURE — 87086 URINE CULTURE/COLONY COUNT: CPT

## 2024-06-28 PROCEDURE — 72148 MRI LUMBAR SPINE W/O DYE: CPT

## 2024-06-28 PROCEDURE — 96375 TX/PRO/DX INJ NEW DRUG ADDON: CPT

## 2024-06-28 PROCEDURE — 82803 BLOOD GASES ANY COMBINATION: CPT

## 2024-06-28 PROCEDURE — 80177 DRUG SCRN QUAN LEVETIRACETAM: CPT

## 2024-06-28 RX ORDER — ACETAMINOPHEN 325 MG/1
650 TABLET ORAL EVERY 6 HOURS PRN
Status: DISCONTINUED | OUTPATIENT
Start: 2024-06-28 | End: 2024-07-03 | Stop reason: HOSPADM

## 2024-06-28 RX ORDER — TAMSULOSIN HYDROCHLORIDE 0.4 MG/1
0.4 CAPSULE ORAL DAILY
Status: DISCONTINUED | OUTPATIENT
Start: 2024-06-29 | End: 2024-07-03 | Stop reason: HOSPADM

## 2024-06-28 RX ORDER — OXCARBAZEPINE 300 MG/1
450 TABLET, FILM COATED ORAL 2 TIMES DAILY
Status: DISCONTINUED | OUTPATIENT
Start: 2024-06-29 | End: 2024-07-03 | Stop reason: HOSPADM

## 2024-06-28 RX ORDER — ACETAMINOPHEN 650 MG/1
650 SUPPOSITORY RECTAL EVERY 6 HOURS PRN
Status: DISCONTINUED | OUTPATIENT
Start: 2024-06-28 | End: 2024-07-03 | Stop reason: HOSPADM

## 2024-06-28 RX ORDER — SODIUM CHLORIDE 0.9 % (FLUSH) 0.9 %
10 SYRINGE (ML) INJECTION PRN
Status: DISCONTINUED | OUTPATIENT
Start: 2024-06-28 | End: 2024-07-03 | Stop reason: HOSPADM

## 2024-06-28 RX ORDER — FUROSEMIDE 10 MG/ML
20 INJECTION INTRAMUSCULAR; INTRAVENOUS ONCE
Status: COMPLETED | OUTPATIENT
Start: 2024-06-28 | End: 2024-06-28

## 2024-06-28 RX ORDER — CYCLOBENZAPRINE HCL 10 MG
5 TABLET ORAL 2 TIMES DAILY
Status: DISCONTINUED | OUTPATIENT
Start: 2024-06-28 | End: 2024-07-03 | Stop reason: HOSPADM

## 2024-06-28 RX ORDER — ONDANSETRON 2 MG/ML
4 INJECTION INTRAMUSCULAR; INTRAVENOUS EVERY 6 HOURS PRN
Status: DISCONTINUED | OUTPATIENT
Start: 2024-06-28 | End: 2024-07-03 | Stop reason: HOSPADM

## 2024-06-28 RX ORDER — AMLODIPINE BESYLATE 10 MG/1
10 TABLET ORAL DAILY
Status: DISCONTINUED | OUTPATIENT
Start: 2024-06-29 | End: 2024-07-03 | Stop reason: HOSPADM

## 2024-06-28 RX ORDER — ONDANSETRON 4 MG/1
4 TABLET, ORALLY DISINTEGRATING ORAL EVERY 8 HOURS PRN
Status: DISCONTINUED | OUTPATIENT
Start: 2024-06-28 | End: 2024-07-03 | Stop reason: HOSPADM

## 2024-06-28 RX ORDER — HEPARIN SODIUM 5000 [USP'U]/ML
5000 INJECTION, SOLUTION INTRAVENOUS; SUBCUTANEOUS EVERY 8 HOURS SCHEDULED
Status: DISCONTINUED | OUTPATIENT
Start: 2024-06-29 | End: 2024-07-03 | Stop reason: HOSPADM

## 2024-06-28 RX ORDER — HYDROCHLOROTHIAZIDE 12.5 MG/1
12.5 CAPSULE, GELATIN COATED ORAL DAILY
Status: DISCONTINUED | OUTPATIENT
Start: 2024-06-29 | End: 2024-07-03 | Stop reason: HOSPADM

## 2024-06-28 RX ORDER — LANOLIN ALCOHOL/MO/W.PET/CERES
6 CREAM (GRAM) TOPICAL NIGHTLY PRN
Status: DISCONTINUED | OUTPATIENT
Start: 2024-06-28 | End: 2024-07-03 | Stop reason: HOSPADM

## 2024-06-28 RX ORDER — LISINOPRIL 40 MG/1
40 TABLET ORAL NIGHTLY
Status: DISCONTINUED | OUTPATIENT
Start: 2024-06-28 | End: 2024-07-01

## 2024-06-28 RX ORDER — GABAPENTIN 300 MG/1
300 CAPSULE ORAL DAILY
Status: DISCONTINUED | OUTPATIENT
Start: 2024-06-29 | End: 2024-06-30

## 2024-06-28 RX ORDER — SODIUM CHLORIDE 9 MG/ML
INJECTION, SOLUTION INTRAVENOUS PRN
Status: DISCONTINUED | OUTPATIENT
Start: 2024-06-28 | End: 2024-07-03 | Stop reason: HOSPADM

## 2024-06-28 RX ORDER — PRAVASTATIN SODIUM 40 MG
40 TABLET ORAL DAILY
Status: DISCONTINUED | OUTPATIENT
Start: 2024-06-29 | End: 2024-07-03 | Stop reason: HOSPADM

## 2024-06-28 RX ORDER — SIMETHICONE 80 MG
80 TABLET,CHEWABLE ORAL EVERY 6 HOURS PRN
Status: DISCONTINUED | OUTPATIENT
Start: 2024-06-28 | End: 2024-07-03 | Stop reason: HOSPADM

## 2024-06-28 RX ORDER — MIRTAZAPINE 15 MG/1
15 TABLET, FILM COATED ORAL NIGHTLY
Status: DISCONTINUED | OUTPATIENT
Start: 2024-06-28 | End: 2024-07-03 | Stop reason: HOSPADM

## 2024-06-28 RX ORDER — SODIUM CHLORIDE 0.9 % (FLUSH) 0.9 %
5-40 SYRINGE (ML) INJECTION EVERY 12 HOURS SCHEDULED
Status: DISCONTINUED | OUTPATIENT
Start: 2024-06-28 | End: 2024-07-03 | Stop reason: HOSPADM

## 2024-06-28 RX ORDER — POLYETHYLENE GLYCOL 3350 17 G/17G
17 POWDER, FOR SOLUTION ORAL DAILY PRN
Status: DISCONTINUED | OUTPATIENT
Start: 2024-06-28 | End: 2024-07-03 | Stop reason: HOSPADM

## 2024-06-28 RX ORDER — CALCIUM CHLORIDE 100 MG/ML
1000 INJECTION INTRAVENOUS; INTRAVENTRICULAR ONCE
Status: COMPLETED | OUTPATIENT
Start: 2024-06-28 | End: 2024-06-28

## 2024-06-28 RX ORDER — SODIUM CHLORIDE 9 MG/ML
INJECTION, SOLUTION INTRAVENOUS CONTINUOUS
Status: DISCONTINUED | OUTPATIENT
Start: 2024-06-28 | End: 2024-06-28

## 2024-06-28 RX ADMIN — SODIUM ZIRCONIUM CYCLOSILICATE 10 G: 10 POWDER, FOR SUSPENSION ORAL at 23:16

## 2024-06-28 RX ADMIN — DEXTROSE MONOHYDRATE 250 ML: 100 INJECTION, SOLUTION INTRAVENOUS at 14:40

## 2024-06-28 RX ADMIN — SODIUM CHLORIDE: 9 INJECTION, SOLUTION INTRAVENOUS at 15:02

## 2024-06-28 RX ADMIN — CYCLOBENZAPRINE 5 MG: 10 TABLET, FILM COATED ORAL at 21:17

## 2024-06-28 RX ADMIN — CALCIUM CHLORIDE INJECTION 1000 MG: 100 INJECTION, SOLUTION INTRAVENOUS at 14:59

## 2024-06-28 RX ADMIN — LEVETIRACETAM 1250 MG: 500 TABLET, FILM COATED ORAL at 21:18

## 2024-06-28 RX ADMIN — CEFTRIAXONE SODIUM 1000 MG: 1 INJECTION, POWDER, FOR SOLUTION INTRAMUSCULAR; INTRAVENOUS at 20:08

## 2024-06-28 RX ADMIN — SODIUM BICARBONATE: 84 INJECTION, SOLUTION INTRAVENOUS at 21:15

## 2024-06-28 RX ADMIN — SIMETHICONE 80 MG: 80 TABLET, CHEWABLE ORAL at 22:33

## 2024-06-28 RX ADMIN — SODIUM BICARBONATE 50 MEQ: 84 INJECTION, SOLUTION INTRAVENOUS at 14:39

## 2024-06-28 RX ADMIN — Medication 10 UNITS: at 14:41

## 2024-06-28 RX ADMIN — FUROSEMIDE 20 MG: 10 INJECTION, SOLUTION INTRAMUSCULAR; INTRAVENOUS at 23:16

## 2024-06-28 RX ADMIN — MIRTAZAPINE 15 MG: 15 TABLET, FILM COATED ORAL at 21:18

## 2024-06-28 RX ADMIN — SODIUM ZIRCONIUM CYCLOSILICATE 10 G: 10 POWDER, FOR SUSPENSION ORAL at 17:49

## 2024-06-28 RX ADMIN — Medication 6 MG: at 21:17

## 2024-06-28 ASSESSMENT — PAIN DESCRIPTION - DESCRIPTORS: DESCRIPTORS: PRESSURE

## 2024-06-28 ASSESSMENT — LIFESTYLE VARIABLES: HOW OFTEN DO YOU HAVE A DRINK CONTAINING ALCOHOL: NEVER

## 2024-06-28 ASSESSMENT — PAIN - FUNCTIONAL ASSESSMENT
PAIN_FUNCTIONAL_ASSESSMENT: NONE - DENIES PAIN

## 2024-06-28 ASSESSMENT — PAIN SCALES - GENERAL: PAINLEVEL_OUTOF10: 3

## 2024-06-28 ASSESSMENT — PAIN DESCRIPTION - LOCATION: LOCATION: LEG

## 2024-06-28 ASSESSMENT — PAIN DESCRIPTION - ORIENTATION: ORIENTATION: RIGHT;LEFT

## 2024-06-28 NOTE — H&P
MD   Multiple Vitamins-Minerals (CENTRUM SILVER PO) Take 1 tablet by mouth daily.    Provider, Historical, MD       Allergies:    Patient has no known allergies.    Family History:       Problem Relation Age of Onset    Hypertension Mother     Stroke Father        Physical Exam:  BP (!) 136/56   Pulse 99   Temp 97.5 °F (36.4 °C) (Oral)   Resp 19   Ht 1.753 m (5' 9\")   Wt 86.6 kg (191 lb)   SpO2 96%   BMI 28.21 kg/m²     General appearance: No apparent distress, appears stated age.  Well-appearing elderly male.  Pleasantly confused.  Eyes:  Pupils equal, round, and reactive to light. Conjunctivae/corneas clear.  HENT: Head normal in appearance. External nares normal.  Oral mucosa moist without lesions.  Hearing grossly intact.   Neck: Supple, with full range of motion. Trachea midline.  No gross JVD appreciated.  Respiratory:  Normal respiratory effort. Clear to auscultation, bilaterally without rales or wheezes or rhonchi.  Cardiovascular: Normal rate, regular rhythm with normal S1/S2 without murmurs.  No lower extremity edema.   Abdomen: Soft, mildy distended and tender with normal bowel sounds.  Musculoskeletal: No joint swelling or tenderness. No abnormal movements.   Skin: Warm and dry. No rashes or lesions.  Neurologic:  No obvious focal deficits.  Cranial nerves:  grossly non-focal 2-12.   Difficult to assess, he is having some bilateral lower extremity numbness, groin numbness present.  Diminished patellar reflexes  Psychiatric: Alert and oriented to self and place, scattered insight and thought content.   Capillary Refill: Brisk,< 3 seconds.           Peripheral Pulses: +2 palpable, equal bilaterally.     Labs:     Recent Labs     06/28/24  1300   WBC 11.2*   HGB 11.8*   HCT 37.3*        Recent Labs     06/28/24  1300 06/28/24  1524     --    K 7.9* 5.9*     --    CO2 14*  --    BUN 48*  --    CREATININE 4.2*  --    CALCIUM 8.6  --      Recent Labs     06/28/24  1300   AST 24   ALT  using voice recognition software. It may contain   minor errors which are inherent in voice recognition technology.**            Electronically signed by Dr. Richie Brice      US RENAL COMPLETE    (Results Pending)   MRI BRAIN WO CONTRAST    (Results Pending)   MRI LUMBAR SPINE WO CONTRAST    (Results Pending)        PT/OT Eval Status: will be assessed  Diet: ADULT DIET; Regular; Low Potassium (Less than 3000 mg/day)  DVT prophylaxis: heparin  Code Status: Full Code  Disposition: admit to stepdown     Thank you Jihan Lyon MD for the opportunity to be involved in this patient's care.    Electronically signed by Zach Vázquez DO on 6/28/2024 at 5:54 PM.   Case and plan developed in coordination with Richard Menendez MD

## 2024-06-28 NOTE — ED TRIAGE NOTES
Pt to the ED via [facility] from the [facility] with c/o of bilateral lower leg numbness. Pt had two unwitnessed falls this morning. Pt states he has had this numbness before but only in the left leg. Pt states he did hit his head but declines taking any blood thinners. Small abrasion noted to left side of forehead. Pt has hx of seizures also but does not know when the last one was. EKG obtained upon arrival. Bilateral weakness noted in lower extremities. Decrease sensation in both legs. Pt is A&O X4. Pts feet are warm to touch with appropriate color noted.

## 2024-06-28 NOTE — ED NOTES
ED to inpatient nurses report      Chief Complaint:  Chief Complaint   Patient presents with    Fall    Numbness     BILATERAL LEGS     Present to ED from: [place]    MOA:     LOC: alert and orientated to name and place  Mobility: Requires assistance * 2  Oxygen Baseline: ROOM AIR    Current needs required: NONE     Code Status:   Prior    What abnormal results were found and what did you give/do to treat them? HYPERKALEMIA, ACUTE RENAL FAILURE, FALL, CLOSED HEAD INJURY, PARESTHESIA   Any procedures or intervention occur? SEE MAR    Mental Status:  Level of Consciousness: Alert (0)    Psych Assessment:        Vitals:  Patient Vitals for the past 24 hrs:   BP Temp Temp src Pulse Resp SpO2 Height Weight   06/28/24 1503 (!) 114/59 -- -- (!) 105 18 96 % -- --   06/28/24 1428 (!) 131/50 -- -- 96 18 97 % -- --   06/28/24 1343 (!) 102/58 -- -- 96 16 95 % -- --   06/28/24 1229 109/60 98 °F (36.7 °C) Oral 99 18 94 % 1.753 m (5' 9\") 86.6 kg (191 lb)        LDAs:   Peripheral IV 06/28/24 Right Antecubital (Active)   Site Assessment Clean, dry & intact 06/28/24 1503   Line Status Blood return noted;Flushed;Specimen collected 06/28/24 1247   Phlebitis Assessment No symptoms 06/28/24 1503   Infiltration Assessment 0 06/28/24 1503   Dressing Status New dressing applied 06/28/24 1247   Dressing Type Transparent 06/28/24 1247   Dressing Intervention New 06/28/24 1247       Peripheral IV 06/28/24 Left Arm (Active)   Site Assessment Clean, dry & intact 06/28/24 1503   Line Status Blood return noted;Flushed 06/28/24 1435   Phlebitis Assessment No symptoms 06/28/24 1503   Infiltration Assessment 0 06/28/24 1503   Dressing Status New dressing applied 06/28/24 1435   Dressing Type Transparent 06/28/24 1435   Dressing Intervention New 06/28/24 1435       Ambulatory Status:  No data recorded    Diagnosis:  DISPOSITION Decision To Admit 06/28/2024 02:44:02 PM   Final diagnoses:   Closed head injury, initial encounter   Weakness

## 2024-06-28 NOTE — ED NOTES
Pt medicated per MAR. Tolerated well. Updated on POC. Verbalized understanding. Voices no other needs or concerns at this time. Vitals stable. Call light in reach

## 2024-06-28 NOTE — ED PROVIDER NOTES
EMERGENCY DEPARTMENT ENCOUNTER     CHIEF COMPLAINT   Chief Complaint   Patient presents with    Fall    Numbness     BILATERAL LEGS      HPI   Ceferino Sanches Jr is a 78 y.o. male with a  hx of COPD, seizure disorder, from [facility] (independent  historian), who presents bilateral leg weakness after a fall from standing while in his apartment this morning. Pt stood up and could not get strength from one of his legs, then fell, after which she got up, then fell again, striking the front of his head against the floor. Pt denies any nausea, vomiting or photophobia after the fall. Pt was transported to the ER for evaluation. Pt denies LOC, denies any weakness in his upper extremities, thinking or speech.    Pt has a leg bag via John for his urinary bladder issues (for which he plans to see urology next week).    Most importantly, pt could not feel neither of his lower legs, which was the primary reason for his ED visit.    Pt does not take any anti-coagulants.    PAST MEDICAL & SURGICAL HISTORY   Past Medical History:   Diagnosis Date    Anxiety disorder     Cancer (HCC)     Squamous Cell Cancer    COPD (chronic obstructive pulmonary disease) (HCC)     HLD (hyperlipidemia)     Seizure disorder (HCC)       Past Surgical History:   Procedure Laterality Date    CATARACT REMOVAL      HEMORRHOID SURGERY        CURRENT MEDICATIONS        ALLERGIES   No Known Allergies   SOCIAL & FAMILYHISTORY   Social History     Socioeconomic History    Marital status: Single     Spouse name: None    Number of children: None    Years of education: None    Highest education level: None   Tobacco Use    Smoking status: Former     Current packs/day: 0.00     Average packs/day: 0.3 packs/day for 53.0 years (13.3 ttl pk-yrs)     Types: Cigarettes     Start date: 12/10/1965     Quit date: 12/10/2018     Years since quittin.5    Smokeless tobacco: Never   Substance and Sexual Activity    Alcohol use: No    Drug use: No     Social Determinants of  stabilized, he may need further eval for his bilateral LE to rule out arterial insufficiency    Final Disposition: Admit to Stepdown to medicine service (accepted by Dr. Vázquez), with consultation placed to Dr. Plaza of nephrology.    Electronically signed by: COLTEN ALMODOVAR MD, 6/28/2024 5:06 PM     (This note was completed with a voice recognition program)         Colten Almodovar MD  06/28/24 8912

## 2024-06-28 NOTE — PROGRESS NOTES
Thomas catheter exchanged. Old thomas pulled and appeared to be obstructed with sediment. New thomas placed with urine return. Urine has sediment present.

## 2024-06-28 NOTE — CONSULTS
[facility]          [address]        [address]        [place], [place] [id number] [phone]           Inpatient Initial consult note         6/28/2024 4:05 PM      Patient Name:   Ceferino Sanches Jr  YOB: 1945  Primary Care Physician:  Jihan Lyon MD   Admit Date:    6/28/2024 12:22 PM    Consultation requested by : Richard Menendez MD    Reason for Consult : Nephrology following for acute kidney injury and hyperkalemia.    History of presenting illness :Ceferino Sanches Jr is a 78 y.o.   male with Past Medical History as stated below presented with a chief complaint of Fall and Numbness (BILATERAL LEGS)   on 6/28/2024 .    Patient presented with chief complaints of fall from standing while in his apartment this morning could not get enough strength with his head against the floor.  Denies any chest pain nausea vomiting no diarrhea no abdominal pain does complain of occasional dizziness.  No fever or chills patient states he is eating and drinking well denies any use of nonsteroidal anti-inflammatory agents also denies any use of and antibiotics.    Patient has a history of bladder outlet obstruction and apparently being seen by urology in [place] For the suprapubic catheter symptomatic next week.Upon arrival to the patient was found to have a potassium of 7.9 CO2 of 43 creatinine of 4.2 apparently had some EKG changes as well so nephrology has been consulted for possible need for emergent dialysis.    Currently on examination patient looks comfortable not in any acute distress    Past History:  Past Medical History:   Diagnosis Date    Anxiety disorder     Cancer (HCC)     Squamous Cell Cancer    COPD (chronic obstructive pulmonary disease) (HCC)     HLD (hyperlipidemia)     Seizure disorder (HCC)      Past Surgical History:   Procedure Laterality Date    CATARACT REMOVAL      HEMORRHOID SURGERY       Social History     Socioeconomic History    Marital

## 2024-06-28 NOTE — PROGRESS NOTES
Patient arrived per cart to 3B. Heart monitor applied and vitals taken.  Admission paperwork completed.  Explained to patient that [facility] is not responsible for any lost or stolen items.  Patient verbalized understanding. Oriented to room and use of call light and bed controls.  Patient denies pain or needs. No signs of distress noted.  Bed locked & in low position, side-rails up x2.  Call light in reach.  Reminded patient to call nurse if any needs arise.     2 person skin assessment performed by this nurse and Edie TERESA.

## 2024-06-29 ENCOUNTER — APPOINTMENT (OUTPATIENT)
Dept: MRI IMAGING | Age: 79
DRG: 092 | End: 2024-06-29
Payer: MEDICARE

## 2024-06-29 PROBLEM — E87.5 HYPERKALEMIA: Status: ACTIVE | Noted: 2024-06-29

## 2024-06-29 PROBLEM — R29.898 WEAKNESS OF BOTH LOWER EXTREMITIES: Status: ACTIVE | Noted: 2024-06-29

## 2024-06-29 PROBLEM — R20.2 PARESTHESIAS: Status: ACTIVE | Noted: 2024-06-29

## 2024-06-29 LAB
ANION GAP SERPL CALC-SCNC: 12 MEQ/L (ref 8–16)
BASOPHILS ABSOLUTE: 0 THOU/MM3 (ref 0–0.1)
BASOPHILS NFR BLD AUTO: 0.6 %
BUN SERPL-MCNC: 43 MG/DL (ref 7–22)
CALCIUM SERPL-MCNC: 8.8 MG/DL (ref 8.5–10.5)
CHLORIDE SERPL-SCNC: 106 MEQ/L (ref 98–111)
CO2 SERPL-SCNC: 17 MEQ/L (ref 23–33)
CREAT SERPL-MCNC: 3.1 MG/DL (ref 0.4–1.2)
DEPRECATED RDW RBC AUTO: 51.3 FL (ref 35–45)
EKG ATRIAL RATE: 89 BPM
EKG P AXIS: 68 DEGREES
EKG P-R INTERVAL: 190 MS
EKG Q-T INTERVAL: 362 MS
EKG QRS DURATION: 96 MS
EKG QTC CALCULATION (BAZETT): 440 MS
EKG R AXIS: -7 DEGREES
EKG T AXIS: 50 DEGREES
EKG VENTRICULAR RATE: 89 BPM
EOSINOPHIL NFR BLD AUTO: 6.2 %
EOSINOPHILS ABSOLUTE: 0.4 THOU/MM3 (ref 0–0.4)
ERYTHROCYTE [DISTWIDTH] IN BLOOD BY AUTOMATED COUNT: 13.5 % (ref 11.5–14.5)
FOLATE SERPL-MCNC: > 20 NG/ML (ref 4.8–24.2)
GFR SERPL CREATININE-BSD FRML MDRD: 20 ML/MIN/1.73M2
GLUCOSE BLD STRIP.AUTO-MCNC: 116 MG/DL (ref 70–108)
GLUCOSE SERPL-MCNC: 91 MG/DL (ref 70–108)
HCT VFR BLD AUTO: 35.2 % (ref 42–52)
HGB BLD-MCNC: 11.2 GM/DL (ref 14–18)
IMM GRANULOCYTES # BLD AUTO: 0.02 THOU/MM3 (ref 0–0.07)
IMM GRANULOCYTES NFR BLD AUTO: 0.3 %
KEPPRA: 65 UG/ML
LYMPHOCYTES ABSOLUTE: 1.1 THOU/MM3 (ref 1–4.8)
LYMPHOCYTES NFR BLD AUTO: 15 %
MAGNESIUM SERPL-MCNC: 2.4 MG/DL (ref 1.6–2.4)
MCH RBC QN AUTO: 32.7 PG (ref 26–33)
MCHC RBC AUTO-ENTMCNC: 31.8 GM/DL (ref 32.2–35.5)
MCV RBC AUTO: 102.9 FL (ref 80–94)
MONOCYTES ABSOLUTE: 0.8 THOU/MM3 (ref 0.4–1.3)
MONOCYTES NFR BLD AUTO: 11.3 %
NEUTROPHILS ABSOLUTE: 4.7 THOU/MM3 (ref 1.8–7.7)
NEUTROPHILS NFR BLD AUTO: 66.6 %
NRBC BLD AUTO-RTO: 0 /100 WBC
PLATELET # BLD AUTO: 173 THOU/MM3 (ref 130–400)
PMV BLD AUTO: 10.5 FL (ref 9.4–12.4)
POTASSIUM SERPL-SCNC: 5.9 MEQ/L (ref 3.5–5.2)
RBC # BLD AUTO: 3.42 MILL/MM3 (ref 4.7–6.1)
SODIUM SERPL-SCNC: 135 MEQ/L (ref 135–145)
VIT B12 SERPL-MCNC: 495 PG/ML (ref 211–911)
WBC # BLD AUTO: 7.1 THOU/MM3 (ref 4.8–10.8)

## 2024-06-29 PROCEDURE — 82607 VITAMIN B-12: CPT

## 2024-06-29 PROCEDURE — 72146 MRI CHEST SPINE W/O DYE: CPT

## 2024-06-29 PROCEDURE — 2140000000 HC CCU INTERMEDIATE R&B

## 2024-06-29 PROCEDURE — 6360000002 HC RX W HCPCS: Performed by: STUDENT IN AN ORGANIZED HEALTH CARE EDUCATION/TRAINING PROGRAM

## 2024-06-29 PROCEDURE — 72141 MRI NECK SPINE W/O DYE: CPT

## 2024-06-29 PROCEDURE — 83735 ASSAY OF MAGNESIUM: CPT

## 2024-06-29 PROCEDURE — 2580000003 HC RX 258: Performed by: INTERNAL MEDICINE

## 2024-06-29 PROCEDURE — 84132 ASSAY OF SERUM POTASSIUM: CPT

## 2024-06-29 PROCEDURE — 85025 COMPLETE CBC W/AUTO DIFF WBC: CPT

## 2024-06-29 PROCEDURE — 2580000003 HC RX 258: Performed by: STUDENT IN AN ORGANIZED HEALTH CARE EDUCATION/TRAINING PROGRAM

## 2024-06-29 PROCEDURE — 82746 ASSAY OF FOLIC ACID SERUM: CPT

## 2024-06-29 PROCEDURE — 99232 SBSQ HOSP IP/OBS MODERATE 35: CPT | Performed by: INTERNAL MEDICINE

## 2024-06-29 PROCEDURE — 36415 COLL VENOUS BLD VENIPUNCTURE: CPT

## 2024-06-29 PROCEDURE — 6370000000 HC RX 637 (ALT 250 FOR IP): Performed by: INTERNAL MEDICINE

## 2024-06-29 PROCEDURE — 99223 1ST HOSP IP/OBS HIGH 75: CPT | Performed by: NURSE PRACTITIONER

## 2024-06-29 PROCEDURE — 82948 REAGENT STRIP/BLOOD GLUCOSE: CPT

## 2024-06-29 PROCEDURE — 6370000000 HC RX 637 (ALT 250 FOR IP): Performed by: STUDENT IN AN ORGANIZED HEALTH CARE EDUCATION/TRAINING PROGRAM

## 2024-06-29 PROCEDURE — 2500000003 HC RX 250 WO HCPCS: Performed by: INTERNAL MEDICINE

## 2024-06-29 PROCEDURE — 93010 ELECTROCARDIOGRAM REPORT: CPT | Performed by: NUCLEAR MEDICINE

## 2024-06-29 PROCEDURE — 80048 BASIC METABOLIC PNL TOTAL CA: CPT

## 2024-06-29 PROCEDURE — 6370000000 HC RX 637 (ALT 250 FOR IP)

## 2024-06-29 RX ADMIN — SODIUM ZIRCONIUM CYCLOSILICATE 10 G: 10 POWDER, FOR SUSPENSION ORAL at 11:29

## 2024-06-29 RX ADMIN — SODIUM BICARBONATE: 84 INJECTION, SOLUTION INTRAVENOUS at 22:00

## 2024-06-29 RX ADMIN — SIMETHICONE 80 MG: 80 TABLET, CHEWABLE ORAL at 20:04

## 2024-06-29 RX ADMIN — SODIUM CHLORIDE, PRESERVATIVE FREE 10 ML: 5 INJECTION INTRAVENOUS at 20:04

## 2024-06-29 RX ADMIN — GABAPENTIN 300 MG: 300 CAPSULE ORAL at 09:11

## 2024-06-29 RX ADMIN — SODIUM CHLORIDE, PRESERVATIVE FREE 10 ML: 5 INJECTION INTRAVENOUS at 09:12

## 2024-06-29 RX ADMIN — PRAVASTATIN SODIUM 40 MG: 40 TABLET ORAL at 09:11

## 2024-06-29 RX ADMIN — CEFTRIAXONE SODIUM 1000 MG: 1 INJECTION, POWDER, FOR SOLUTION INTRAMUSCULAR; INTRAVENOUS at 20:11

## 2024-06-29 RX ADMIN — ACETAMINOPHEN 650 MG: 325 TABLET ORAL at 09:10

## 2024-06-29 RX ADMIN — LEVETIRACETAM 1250 MG: 500 TABLET, FILM COATED ORAL at 09:11

## 2024-06-29 RX ADMIN — CYCLOBENZAPRINE 5 MG: 10 TABLET, FILM COATED ORAL at 20:03

## 2024-06-29 RX ADMIN — OXCARBAZEPINE 450 MG: 300 TABLET, FILM COATED ORAL at 20:04

## 2024-06-29 RX ADMIN — HEPARIN SODIUM 5000 UNITS: 5000 INJECTION INTRAVENOUS; SUBCUTANEOUS at 16:56

## 2024-06-29 RX ADMIN — TAMSULOSIN HYDROCHLORIDE 0.4 MG: 0.4 CAPSULE ORAL at 09:11

## 2024-06-29 RX ADMIN — SODIUM ZIRCONIUM CYCLOSILICATE 10 G: 10 POWDER, FOR SUSPENSION ORAL at 17:00

## 2024-06-29 RX ADMIN — SODIUM BICARBONATE: 84 INJECTION, SOLUTION INTRAVENOUS at 09:23

## 2024-06-29 RX ADMIN — Medication 6 MG: at 20:03

## 2024-06-29 RX ADMIN — LEVETIRACETAM 1250 MG: 500 TABLET, FILM COATED ORAL at 20:04

## 2024-06-29 RX ADMIN — OXCARBAZEPINE 450 MG: 300 TABLET, FILM COATED ORAL at 09:11

## 2024-06-29 RX ADMIN — CYCLOBENZAPRINE 5 MG: 10 TABLET, FILM COATED ORAL at 09:11

## 2024-06-29 RX ADMIN — MIRTAZAPINE 15 MG: 15 TABLET, FILM COATED ORAL at 20:04

## 2024-06-29 RX ADMIN — AMLODIPINE BESYLATE 10 MG: 10 TABLET ORAL at 09:11

## 2024-06-29 RX ADMIN — HEPARIN SODIUM 5000 UNITS: 5000 INJECTION INTRAVENOUS; SUBCUTANEOUS at 05:36

## 2024-06-29 ASSESSMENT — PAIN DESCRIPTION - DESCRIPTORS
DESCRIPTORS: ACHING
DESCRIPTORS: ACHING

## 2024-06-29 ASSESSMENT — PAIN DESCRIPTION - LOCATION
LOCATION: BACK
LOCATION: BACK;FOOT
LOCATION: BACK

## 2024-06-29 ASSESSMENT — PAIN - FUNCTIONAL ASSESSMENT: PAIN_FUNCTIONAL_ASSESSMENT: ACTIVITIES ARE NOT PREVENTED

## 2024-06-29 ASSESSMENT — PAIN SCALES - GENERAL
PAINLEVEL_OUTOF10: 4
PAINLEVEL_OUTOF10: 2
PAINLEVEL_OUTOF10: 4
PAINLEVEL_OUTOF10: 2
PAINLEVEL_OUTOF10: 2
PAINLEVEL_OUTOF10: 4
PAINLEVEL_OUTOF10: 2
PAINLEVEL_OUTOF10: 3
PAINLEVEL_OUTOF10: 5
PAINLEVEL_OUTOF10: 2
PAINLEVEL_OUTOF10: 2
PAINLEVEL_OUTOF10: 5

## 2024-06-29 ASSESSMENT — PAIN DESCRIPTION - ORIENTATION
ORIENTATION: LOWER
ORIENTATION: LOWER;MID
ORIENTATION: MID;UPPER

## 2024-06-29 NOTE — PLAN OF CARE
Problem: Discharge Planning  Goal: Discharge to home or other facility with appropriate resources  Outcome: Progressing  Flowsheets  Taken 6/29/2024 0059 by Linda Peña RN  Discharge to home or other facility with appropriate resources: Identify barriers to discharge with patient and caregiver  Taken 6/28/2024 1644 by Светлана Dacosta RN  Discharge to home or other facility with appropriate resources:   Identify barriers to discharge with patient and caregiver   Identify discharge learning needs (meds, wound care, etc)   Refer to discharge planning if patient needs post-hospital services based on physician order or complex needs related to functional status, cognitive ability or social support system     Problem: Safety - Adult  Goal: Free from fall injury  Outcome: Progressing  Flowsheets (Taken 6/29/2024 0059)  Free From Fall Injury: Instruct family/caregiver on patient safety     Problem: Pain  Goal: Verbalizes/displays adequate comfort level or baseline comfort level  Outcome: Progressing  Flowsheets (Taken 6/29/2024 0059)  Verbalizes/displays adequate comfort level or baseline comfort level: Encourage patient to monitor pain and request assistance

## 2024-06-29 NOTE — PROCEDURES
PROCEDURE NOTE  Date: 6/28/2024   Name: Ceferino Sanches Jr  YOB: 1945    Procedures    12 lead EKG completed. Results handed to Kristine TERESA.

## 2024-06-29 NOTE — CARE COORDINATION
06/29/24 0925   Service Assessment   Patient Orientation Alert and Oriented;Situation;Place;Person;Self   History Provided By Patient;Child/Family  (Sister Daina)   Primary Caregiver Self   Accompanied By/Relationship Sister Daina   Support Systems Family Members;Other (Comment)  ([facility])   Patient's Healthcare Decision Maker is: Legal Next of Kin   PCP Verified by CM Yes   Last Visit to PCP Within last 3 months   Prior Functional Level Independent in ADLs/IADLs;Dressing;Toileting;Mobility   Current Functional Level Assistance with the following:;Bathing;Dressing;Cooking;Housework;Shopping;Mobility   Can patient return to prior living arrangement No   Ability to make needs known: Good   Family able to assist with home care needs: Other (comment)  (Lives in [place])   Would you like for me to discuss the discharge plan with any other family members/significant others, and if so, who? Yes  (Sister Daina & [facility])   Financial Resources Medicaid;Medicare   Community Resources Assisted Living;Transportation;Other (Comment)  (Family for regular and [place] for activities)   CM/SW Referral Other (see comment)  (None)   Discharge Planning   Type of Residence Assisted living   Living Arrangements Other (Comment)  ([place])   Current Services Prior To Admission None   Potential Assistance Needed N/A   DME Ordered? No   Potential Assistance Purchasing Medications No   Type of Home Care Services None   Patient expects to be discharged to: Assisted living   Follow Up Appointment: Best Day/Time  Thursday AM   One/Two Story Residence Other (comment)  (3rd level of [place] uses elevator)   Lift Chair Available No   History of falls? 1   Services At/After Discharge   Transition of Care Consult (CM Consult) Assisted Living   Services At/After Discharge None   Mode of Transport at Discharge Other (see comment)  (Sister Daina)   Confirm Follow Up Transport Family   Condition of Participation: Discharge Planning   The Plan for  Transition of Care is related to the following treatment goals: To get over this stuff and get back to my old self.     Patient Goals/Plan/Treatment Preferences: To get over this stuff and get back to my old self. From [facility] [place] plans to return there, denies d/c needs.    If patient is discharged prior to next notation, then this note serves as note for discharge by case management.       Has ACP documents at home, sister Daina to bring in to have attached to chart this admission.

## 2024-06-29 NOTE — PROGRESS NOTES
Hospitalist Progress Note      Patient:  Ceferino Sanches Jr    Unit/Bed:3B23/023-A  YOB: 1945  MRN: [id number]   Acct: [id number]   PCP: Jihan Lyon MD  Date of Admission: 6/28/2024    Assessment/Plan:  -Lower extremity weakness of greater than 2 months.  MRI reviewed.  Vitamin B12 and TSH noted.  Will consult neuro for decreased sensation and strength of lower ext.    -BRENNA   Cont Bicarb drip per nephro   Creatinine improving  Avoid nephrotoxins    - Hyperkalemia  Improved  with Lokelma  Nephro following      -Leucocytosis   Resolved now.  Await final cult and if neg stop antibiotics.    - Urinary retention  Had undergone cysto on 6/17 and plans are for Suprapubic cath. Noted had small prostate.  Unsure if related to neuro     -Anemia  F/u on Hb      - Suppressed TSH and T4  ? Sick uthyroid    -Seizures  Cont antiepileptics    -Hyperlipidemia  Cont statins    -HTN   Antihypertensives held   BP stable with no further hypotension          LDA: []CVC / []PICC / []Midline / [x]John / []Drains / []Mediport / []None  Antibiotics: Rocephin  Steroids: none  Labs (still needed?): [x]Yes / []No  IVF (still needed?): [x]Yes / []No    Level of care: [x]Step Down / []Med-Surg  Bed Status: [x]Inpatient / []Observation  Telemetry: [x]Yes / []No  PT/OT: [x]Yes / []No    DVT Prophylaxis: [] Lovenox / [x] Heparin / [] SCDs / [] Already on Systemic Anticoagulation / [] None         Disposition: Back to assisted living vs ECF              Subjective (past 24 hours):   Lower back pain not worse   Lying in bed  Weakness lower legs for 2 months   Fell 3 times on the day of admission  No LOC or seizures  Bicarb drip  Await final cult to stop antibiotics       Past medical history, family history, social history and allergies reviewed again and is unchanged since admission.    ROS (12 point review of systems completed.  Pertinent positives noted. Otherwise ROS is  difficulty flexing both knees, barely moves toes. Absent knee and ankle reflex bilat. No sensation to tough on Lt and decreased on Rt below both knees.l.  Psychiatric: Alert and oriented, thought content appropriate, normal insight  Capillary Refill: intact  Peripheral Pulses: trace edema    Labs:   Recent Labs     06/28/24  1300 06/29/24  0354   WBC 11.2* 7.1   HGB 11.8* 11.2*   HCT 37.3* 35.2*    173     Recent Labs     06/28/24  1300 06/28/24  1524 06/28/24  2121 06/29/24  0354     --  138 135   K 7.9* 5.9* 6.3* 5.9*     --  108 106   CO2 14*  --  14* 17*   BUN 48*  --  46* 43*   CREATININE 4.2*  --  3.4* 3.1*   CALCIUM 8.6  --  9.3 8.8     Recent Labs     06/28/24  1300   AST 24   ALT 17   BILITOT <0.2*   ALKPHOS 142*     Recent Labs     06/28/24  1300   INR 1.04     No results for input(s): \"CKTOTAL\", \"TROPONINI\" in the last 72 hours.    Microbiology:    Blood culture #1: No results found for: \"BC\"    Blood culture #2:No results found for: \"BLOODCULT2\"    Organism:  Lab Results   Component Value Date/Time    ORG Growth of Contaminants 05/14/2024 02:19 AM       No results found for: \"LABGRAM\"    MRSA culture only:No results found for: \"MRSAC\"    Urine culture: No results found for: \"LABURIN\"    Respiratory culture: No results found for: \"CULTRESP\"    Aerobic and Anaerobic :  No results found for: \"LABAERO\"  No results found for: \"LABANAE\"    Urinalysis:      Lab Results   Component Value Date/Time    NITRU NEGATIVE 06/28/2024 05:34 PM    WBCUA > 200 06/28/2024 05:34 PM    BACTERIA FEW 06/28/2024 05:34 PM    RBCUA > 100 06/28/2024 05:34 PM    BLOODU LARGE 06/28/2024 05:34 PM    GLUCOSEU NEGATIVE 06/28/2024 05:34 PM       Radiology:  MRI BRAIN WO CONTRAST   Final Result   Impression:   Normal MRI of the brain for age.      This document has been electronically signed by: Adriano Chaudhary MD on    06/28/2024 07:46 PM      MRI LUMBAR SPINE WO CONTRAST   Final Result   Old L1 inferior endplate  an old fracture. There is no prevertebral soft tissue swelling. There are degenerative changes at C3-4, C6-7, C7-T1 and C5-6.. There is mild diffuse osteopenia. At C1-2, there is normal alignment of the dens relative to anterior arch of C1. At C2-3, there is no disc herniation, canal or foraminal stenosis. At C3-4, there is mild canal, moderate right and mild to moderate left-sided foraminal stenosis. At C4-5, there is mild to moderate bilateral foraminal stenosis. There is no canal stenosis. At C5-6, there is mild to moderate bilateral foraminal stenosis. There is no canal stenosis At C6-7, there is mild canal, moderate right and mild to moderate left-sided foraminal stenosis. At C7-T1, there is mild canal and mild to moderate bilateral foraminal stenosis . There is bilateral carotid artery calcification..  There are no suspicious findings in the lung apices.     1. Stable CT scan of the cervical spine, no interval change since previous study dated 5/17/2020. 2. No acute fracture noted.. **This report has been created using voice recognition software. It may contain minor errors which are inherent in voice recognition technology.** Electronically signed by Dr. Richie Brice    CT Head W/O Contrast    Result Date: 6/28/2024  PROCEDURE: CT HEAD WO CONTRAST CLINICAL INFORMATION: fell. leg weakness. COMPARISON: CT scan of the brain dated 5/9/2024.. TECHNIQUE: Noncontrast 5 mm axial images were obtained through the brain. Sagittal and coronal reconstructions were obtained. All CT scans at this facility use dose modulation, iterative reconstruction, and/or weight-based dosing when appropriate to reduce radiation dose to as low as reasonably achievable. FINDINGS: There is mild atrophy and dilatation of the third and lateral ventricles. There is a cavum septum pellucidum and cavum vergae. There is diminished attenuation in the white matter most likely representing ischemic changes. There is no hemorrhage. There are no

## 2024-06-29 NOTE — PLAN OF CARE
Problem: Safety - Adult  Goal: Free from fall injury  6/29/2024 1022 by Palak Gan RN  Outcome: Progressing  Flowsheets (Taken 6/29/2024 1022)  Free From Fall Injury:   Instruct family/caregiver on patient safety   Based on caregiver fall risk screen, instruct family/caregiver to ask for assistance with transferring infant if caregiver noted to have fall risk factors  6/29/2024 0059 by Linda Peañ RN  Outcome: Progressing  Flowsheets (Taken 6/29/2024 0059)  Free From Fall Injury: Instruct family/caregiver on patient safety     Problem: Pain  Goal: Verbalizes/displays adequate comfort level or baseline comfort level  6/29/2024 1022 by Palak Gan RN  Outcome: Progressing  Flowsheets  Taken 6/29/2024 1022  Verbalizes/displays adequate comfort level or baseline comfort level:   Encourage patient to monitor pain and request assistance   Assess pain using appropriate pain scale  Taken 6/29/2024 0830  Verbalizes/displays adequate comfort level or baseline comfort level: Encourage patient to monitor pain and request assistance  6/29/2024 0059 by Linda Peña RN  Outcome: Progressing  Flowsheets (Taken 6/29/2024 0059)  Verbalizes/displays adequate comfort level or baseline comfort level: Encourage patient to monitor pain and request assistance

## 2024-06-29 NOTE — CONSULTS
Neurology Consult Note    Date:6/29/2024       Room:-23/023-A  Patient Name:Ceferino Sanches Jr     YOB: 1945     Age:78 y.o.    Requesting Physician: Enrike Perales MD     Reason for Consult:  Evaluate for lower extremity weakness and decreased sensation      Chief Complaint:   Chief Complaint   Patient presents with    Fall    Numbness     BILATERAL LEGS       Subjective     Ceferino Sanches Jr is a 78 y.o. male with a history of hyperlipidemia, anxiety, squamous cell carcinoma, COPD, dementia, seizure disorder on Keppra 1250 twice daily, Trileptal 450 twice daily who presents to [facility] on 6/28/2024 following 3 falls at home.  He reports that for the last month and a half he has been experiencing left leg numbness from the top of the knee down to his foot which subsequently went into his right knee down to the middle of his calf on the internal aspect of his leg.  Secondary to this numbness he has had a difficulty time and ambulating for which she associates causing his falls. He denies any seizure-like activity or loss of consciousness during the fall.  He did hit his head on the third fall.  CT head negative for any acute intracranial abnormalities.  MRI brain negative for any acute intracranial abnormalities.  MRI lumbar spine shows old L1 inferior endplate vertebral body compression fracture.  He typically gets around by grabbing onto objects and transferring that way.  He does not currently use a cane or a walker.  He does reside in [facility].  He has been following outpatient with Dr. Rollins for back pain that does not require surgical intervention..  5 weeks ago due to his bladder and not functioning properly and was scheduled to see a urologist on July 10 to discuss further management.  He did go home with a John after his last admission due to urinary retention.  He denies any recent illness.  He denies any recent seizures or seizure-like  abnormalities within the spinal canal. On the axial images, at T12-L1, there is no disc herniation, canal or foraminal stenosis. At   L1-L2, there is mild canal and mild to moderate bilateral foraminal stenosis. At L2-3, there is no disc herniation, canal or foraminal stenosis. At L3-4, there is mild canal and mild to moderate bilateral foraminal stenosis. At L4-5, there is mild to moderate bilateral foraminal stenosis but no canal stenosis. At L5-S1, there is no disc herniation, canal or foraminal stenosis. There there is degenerative change involving the sacroiliac joints bilaterally..     1. Stable CT scan of the lumbar spine, no interval change since previous MRI scan dated 8/22/2022. 2. No acute fracture noted. **This report has been created using voice recognition software. It may contain minor errors which are inherent in voice recognition technology.** Electronically signed by Dr. Richie Brice    CT CSpine W/O Contrast    Result Date: 6/28/2024  PROCEDURE: CT CERVICAL SPINE WO CONTRAST CLINICAL INFORMATION: fell, leg weakness. COMPARISON: CT scan of the cervical spine dated 5/17/2022.. TECHNIQUE: 3 mm noncontrast axial images were obtained through the cervical spine with sagittal and coronal reconstructions. All CT scans at this facility use dose modulation, iterative reconstruction, and/or weight-based dosing when appropriate to reduce radiation dose to as low as reasonably achievable. FINDINGS: There is straightening of the normal cervical lordosis..  There is no acute fracture. There is deformity of the spinous processes of C6 which may be secondary to an old fracture. There is no prevertebral soft tissue swelling. There are degenerative changes at C3-4, C6-7, C7-T1 and C5-6.. There is mild diffuse osteopenia. At C1-2, there is normal alignment of the dens relative to anterior arch of C1. At C2-3, there is no disc herniation, canal or foraminal stenosis. At C3-4, there is mild canal, moderate right and

## 2024-06-29 NOTE — PROCEDURES
PROCEDURE NOTE  Date: 6/28/2024   Name: Ceferino Sanches Jr  YOB: 1945    Procedures  Bladder scan completed at 2105 and results told to Kristine TERESA. Scan showed 12 mL in the patients bladder. Patient has a foely.

## 2024-06-29 NOTE — PROGRESS NOTES
[facility]   Nephrology progress note  6/29/2024, 9:28 AM      Pt Name:    Ceferino Sanches Jr  MRN:     [id number]     YOB: 1945  Admit Date:    6/28/2024 12:22 PM    Chief Complaint: Nephrology following for acute kidney injury and hyperkalemia.    Subjective:  Patient seen and examined  No chest pain or shortness of breath  Feels okay    Objective:  24HR INTAKE/OUTPUT:    Intake/Output Summary (Last 24 hours) at 6/29/2024 0928  Last data filed at 6/29/2024 0358  Gross per 24 hour   Intake --   Output 1300 ml   Net -1300 ml      Admission weight: 86.6 kg (191 lb)  Wt Readings from Last 3 Encounters:   06/28/24 86.6 kg (191 lb)   05/09/24 88.5 kg (195 lb)   04/04/24 88 kg (194 lb)        Vitals :   Vitals:    06/28/24 1954 06/28/24 2315 06/29/24 0358 06/29/24 0830   BP: (!) 123/56 (!) 136/56 (!) 131/59 123/61   Pulse: 94 90 91 85   Resp: 20 20 18 18   Temp: 98 °F (36.7 °C) 97.7 °F (36.5 °C) 97.9 °F (36.6 °C) 97.5 °F (36.4 °C)   TempSrc: Oral Oral Oral Oral   SpO2: 96% 98% 98% 99%   Weight:       Height:           Physical examination  General Appearance:  Well developed. No distress  Mouth/Throat:  Oral mucosa moist  Neck:  Supple, no JVD  Lungs:  Breath sounds: clear  Heart::  S1,S2 heard  Abdomen:  Soft, non - tender  Musculoskeletal:  Edema -no significant edema noted    Medications:  Infusion:    sodium chloride      sodium bicarbonate 75 mEq in sodium chloride 0.45 % 1,000 mL infusion 100 mL/hr at 06/29/24 0923     Meds:    sodium chloride flush  5-40 mL IntraVENous 2 times per day    heparin (porcine)  5,000 Units SubCUTAneous 3 times per day    amLODIPine  10 mg Oral Daily    cyclobenzaprine  5 mg Oral BID    gabapentin  300 mg Oral Daily    [Held by provider] hydroCHLOROthiazide  12.5 mg Oral Daily    levETIRAcetam  1,250 mg Oral BID    [Held by provider] lisinopril  40 mg Oral Nightly    mirtazapine  15 mg Oral Nightly    pravastatin  40 mg Oral Daily    tamsulosin  0.4 mg  Oral Daily    OXcarbazepine  450 mg Oral BID    cefTRIAXone (ROCEPHIN) IV  1,000 mg IntraVENous Q24H       Lab Data :  CBC:   Recent Labs     06/28/24  1300 06/29/24  0354   WBC 11.2* 7.1   HGB 11.8* 11.2*   HCT 37.3* 35.2*    173     CMP:  Recent Labs     06/28/24  1300 06/28/24  1524 06/28/24  2121 06/29/24  0354     --  138 135   K 7.9* 5.9* 6.3* 5.9*     --  108 106   CO2 14*  --  14* 17*   BUN 48*  --  46* 43*   CREATININE 4.2*  --  3.4* 3.1*   GLUCOSE 94  --  120* 91   CALCIUM 8.6  --  9.3 8.8   MG  --   --  2.7* 2.4     Hepatic:   Recent Labs     06/28/24  1300   AST 24   ALT 17   BILITOT <0.2*   ALKPHOS 142*       Assessment and Plan:  Renal -acute kidney injury etiology not certain definitely no lower to rule out any bladder obstruction await a renal ultrasound scan the John catheter does not have any urine at all he was also on hydrochlorothiazide and lisinopril at home.  Held the lisinopril and the hydrochlorothiazide.  Making good urine output creatinine getting better  Continue bicarb drip for now  Electrolytes -hyperkalemia will continues to be running high start Lokelma 10 every 6 hours for 2 doses  Acid-base status patient definitely has nongap metabolic acidosis mostly due to renal failure will start on some sodium bicarbonate infusion  History of urinary retention being planned for suprapubic catheter placement next month  Essential hypertension  Meds reviewed and discussed with patient, and primary team    Chris Plaza MD  [facility]    This report has been created using voice recognition software. It may contain minor errors which are inherent in voice recognition technology

## 2024-06-30 PROBLEM — E87.20 METABOLIC ACIDOSIS: Status: ACTIVE | Noted: 2024-06-30

## 2024-06-30 LAB
ANION GAP SERPL CALC-SCNC: 11 MEQ/L (ref 8–16)
BASOPHILS ABSOLUTE: 0 THOU/MM3 (ref 0–0.1)
BASOPHILS NFR BLD AUTO: 0.9 %
BUN SERPL-MCNC: 33 MG/DL (ref 7–22)
CALCIUM SERPL-MCNC: 7.7 MG/DL (ref 8.5–10.5)
CHLORIDE SERPL-SCNC: 108 MEQ/L (ref 98–111)
CO2 SERPL-SCNC: 21 MEQ/L (ref 23–33)
CREAT SERPL-MCNC: 1.8 MG/DL (ref 0.4–1.2)
DEPRECATED RDW RBC AUTO: 49.3 FL (ref 35–45)
EOSINOPHIL NFR BLD AUTO: 9.1 %
EOSINOPHILS ABSOLUTE: 0.4 THOU/MM3 (ref 0–0.4)
ERYTHROCYTE [DISTWIDTH] IN BLOOD BY AUTOMATED COUNT: 13.2 % (ref 11.5–14.5)
GFR SERPL CREATININE-BSD FRML MDRD: 38 ML/MIN/1.73M2
GLUCOSE SERPL-MCNC: 87 MG/DL (ref 70–108)
HCT VFR BLD AUTO: 29.7 % (ref 42–52)
HGB BLD-MCNC: 9.7 GM/DL (ref 14–18)
IMM GRANULOCYTES # BLD AUTO: 0.01 THOU/MM3 (ref 0–0.07)
IMM GRANULOCYTES NFR BLD AUTO: 0.2 %
LYMPHOCYTES ABSOLUTE: 1.1 THOU/MM3 (ref 1–4.8)
LYMPHOCYTES NFR BLD AUTO: 24.4 %
MAGNESIUM SERPL-MCNC: 2 MG/DL (ref 1.6–2.4)
MCH RBC QN AUTO: 33 PG (ref 26–33)
MCHC RBC AUTO-ENTMCNC: 32.7 GM/DL (ref 32.2–35.5)
MCV RBC AUTO: 101 FL (ref 80–94)
MONOCYTES ABSOLUTE: 0.5 THOU/MM3 (ref 0.4–1.3)
MONOCYTES NFR BLD AUTO: 9.9 %
NEUTROPHILS ABSOLUTE: 2.6 THOU/MM3 (ref 1.8–7.7)
NEUTROPHILS NFR BLD AUTO: 55.5 %
NRBC BLD AUTO-RTO: 0 /100 WBC
PLATELET # BLD AUTO: 156 THOU/MM3 (ref 130–400)
PMV BLD AUTO: 10.6 FL (ref 9.4–12.4)
POTASSIUM SERPL-SCNC: 4.3 MEQ/L (ref 3.5–5.2)
POTASSIUM SERPL-SCNC: 4.6 MEQ/L (ref 3.5–5.2)
RBC # BLD AUTO: 2.94 MILL/MM3 (ref 4.7–6.1)
SODIUM SERPL-SCNC: 140 MEQ/L (ref 135–145)
WBC # BLD AUTO: 4.6 THOU/MM3 (ref 4.8–10.8)

## 2024-06-30 PROCEDURE — 1200000000 HC SEMI PRIVATE

## 2024-06-30 PROCEDURE — 85025 COMPLETE CBC W/AUTO DIFF WBC: CPT

## 2024-06-30 PROCEDURE — 6370000000 HC RX 637 (ALT 250 FOR IP): Performed by: STUDENT IN AN ORGANIZED HEALTH CARE EDUCATION/TRAINING PROGRAM

## 2024-06-30 PROCEDURE — 2580000003 HC RX 258: Performed by: INTERNAL MEDICINE

## 2024-06-30 PROCEDURE — 99232 SBSQ HOSP IP/OBS MODERATE 35: CPT | Performed by: INTERNAL MEDICINE

## 2024-06-30 PROCEDURE — 6360000002 HC RX W HCPCS: Performed by: STUDENT IN AN ORGANIZED HEALTH CARE EDUCATION/TRAINING PROGRAM

## 2024-06-30 PROCEDURE — 83735 ASSAY OF MAGNESIUM: CPT

## 2024-06-30 PROCEDURE — 2580000003 HC RX 258: Performed by: STUDENT IN AN ORGANIZED HEALTH CARE EDUCATION/TRAINING PROGRAM

## 2024-06-30 PROCEDURE — 1200000003 HC TELEMETRY R&B

## 2024-06-30 PROCEDURE — 36415 COLL VENOUS BLD VENIPUNCTURE: CPT

## 2024-06-30 PROCEDURE — 2500000003 HC RX 250 WO HCPCS: Performed by: INTERNAL MEDICINE

## 2024-06-30 PROCEDURE — 80048 BASIC METABOLIC PNL TOTAL CA: CPT

## 2024-06-30 RX ORDER — GABAPENTIN 100 MG/1
100 CAPSULE ORAL 3 TIMES DAILY
Status: DISCONTINUED | OUTPATIENT
Start: 2024-07-01 | End: 2024-07-03 | Stop reason: HOSPADM

## 2024-06-30 RX ADMIN — HEPARIN SODIUM 5000 UNITS: 5000 INJECTION INTRAVENOUS; SUBCUTANEOUS at 09:16

## 2024-06-30 RX ADMIN — ACETAMINOPHEN 650 MG: 325 TABLET ORAL at 00:20

## 2024-06-30 RX ADMIN — HEPARIN SODIUM 5000 UNITS: 5000 INJECTION INTRAVENOUS; SUBCUTANEOUS at 16:46

## 2024-06-30 RX ADMIN — CYCLOBENZAPRINE 5 MG: 10 TABLET, FILM COATED ORAL at 09:16

## 2024-06-30 RX ADMIN — SODIUM CHLORIDE, PRESERVATIVE FREE 10 ML: 5 INJECTION INTRAVENOUS at 19:46

## 2024-06-30 RX ADMIN — ACETAMINOPHEN 650 MG: 650 SUPPOSITORY RECTAL at 09:28

## 2024-06-30 RX ADMIN — TAMSULOSIN HYDROCHLORIDE 0.4 MG: 0.4 CAPSULE ORAL at 09:16

## 2024-06-30 RX ADMIN — LEVETIRACETAM 1250 MG: 500 TABLET, FILM COATED ORAL at 20:33

## 2024-06-30 RX ADMIN — HEPARIN SODIUM 5000 UNITS: 5000 INJECTION INTRAVENOUS; SUBCUTANEOUS at 23:17

## 2024-06-30 RX ADMIN — LEVETIRACETAM 1250 MG: 500 TABLET, FILM COATED ORAL at 09:18

## 2024-06-30 RX ADMIN — SODIUM BICARBONATE: 84 INJECTION, SOLUTION INTRAVENOUS at 19:48

## 2024-06-30 RX ADMIN — AMLODIPINE BESYLATE 10 MG: 10 TABLET ORAL at 09:15

## 2024-06-30 RX ADMIN — HEPARIN SODIUM 5000 UNITS: 5000 INJECTION INTRAVENOUS; SUBCUTANEOUS at 00:21

## 2024-06-30 RX ADMIN — CYCLOBENZAPRINE 5 MG: 10 TABLET, FILM COATED ORAL at 19:51

## 2024-06-30 RX ADMIN — ACETAMINOPHEN 650 MG: 325 TABLET ORAL at 19:51

## 2024-06-30 RX ADMIN — PRAVASTATIN SODIUM 40 MG: 40 TABLET ORAL at 09:16

## 2024-06-30 RX ADMIN — SODIUM BICARBONATE: 84 INJECTION, SOLUTION INTRAVENOUS at 09:30

## 2024-06-30 RX ADMIN — OXCARBAZEPINE 450 MG: 300 TABLET, FILM COATED ORAL at 20:33

## 2024-06-30 RX ADMIN — MIRTAZAPINE 15 MG: 15 TABLET, FILM COATED ORAL at 20:33

## 2024-06-30 RX ADMIN — GABAPENTIN 300 MG: 300 CAPSULE ORAL at 09:15

## 2024-06-30 RX ADMIN — OXCARBAZEPINE 450 MG: 300 TABLET, FILM COATED ORAL at 09:19

## 2024-06-30 ASSESSMENT — PAIN - FUNCTIONAL ASSESSMENT: PAIN_FUNCTIONAL_ASSESSMENT: ACTIVITIES ARE NOT PREVENTED

## 2024-06-30 ASSESSMENT — PAIN DESCRIPTION - ORIENTATION
ORIENTATION: LEFT
ORIENTATION: RIGHT;LEFT
ORIENTATION: RIGHT;LEFT

## 2024-06-30 ASSESSMENT — PAIN SCALES - GENERAL
PAINLEVEL_OUTOF10: 5
PAINLEVEL_OUTOF10: 5
PAINLEVEL_OUTOF10: 3
PAINLEVEL_OUTOF10: 4
PAINLEVEL_OUTOF10: 6
PAINLEVEL_OUTOF10: 5

## 2024-06-30 ASSESSMENT — PAIN DESCRIPTION - DESCRIPTORS
DESCRIPTORS: ACHING
DESCRIPTORS: ACHING;BURNING

## 2024-06-30 ASSESSMENT — PAIN DESCRIPTION - LOCATION
LOCATION: FOOT

## 2024-06-30 NOTE — PLAN OF CARE
Problem: Safety - Adult  Goal: Free from fall injury  Outcome: Progressing  Flowsheets (Taken 6/30/2024 1149)  Free From Fall Injury:   Based on caregiver fall risk screen, instruct family/caregiver to ask for assistance with transferring infant if caregiver noted to have fall risk factors   Instruct family/caregiver on patient safety     Problem: Pain  Goal: Verbalizes/displays adequate comfort level or baseline comfort level  Outcome: Progressing  Flowsheets (Taken 6/30/2024 1149)  Verbalizes/displays adequate comfort level or baseline comfort level:   Encourage patient to monitor pain and request assistance   Assess pain using appropriate pain scale

## 2024-06-30 NOTE — PROGRESS NOTES
Transported to  in bed in stable condition. Medications and personal belongings sent. Sister accompanied.

## 2024-06-30 NOTE — PROGRESS NOTES
[facility]   Nephrology progress note  6/30/2024, 10:00 AM      Pt Name:    Ceferino Sanches Jr  MRN:     [id number]     YOB: 1945  Admit Date:    6/28/2024 12:22 PM    Chief Complaint: Nephrology following for acute kidney injury and hyperkalemia.    Subjective:  Patient seen and examined  No chest pain or shortness of breath  Feels okay.  Urine output decent    Objective:  24HR INTAKE/OUTPUT:    Intake/Output Summary (Last 24 hours) at 6/30/2024 1000  Last data filed at 6/30/2024 0401  Gross per 24 hour   Intake 560 ml   Output 900 ml   Net -340 ml        Admission weight: 86.6 kg (191 lb)  Wt Readings from Last 3 Encounters:   06/28/24 86.6 kg (191 lb)   05/09/24 88.5 kg (195 lb)   04/04/24 88 kg (194 lb)        Vitals :   Vitals:    06/29/24 1953 06/29/24 2326 06/30/24 0401 06/30/24 0901   BP: 133/64 120/61 138/72 127/67   Pulse: 95 92 95 85   Resp: 18 16 18 18   Temp: 97.9 °F (36.6 °C) 97.7 °F (36.5 °C) 97.8 °F (36.6 °C) 97.7 °F (36.5 °C)   TempSrc: Oral Oral Oral Oral   SpO2: 95% 93% 95% 97%   Weight:       Height:           Physical examination  General Appearance:  Well developed. No distress  Mouth/Throat:  Oral mucosa moist  Neck:  Supple, no JVD  Lungs:  Breath sounds: clear  Heart::  S1,S2 heard  Abdomen:  Soft, non - tender  Musculoskeletal:  Edema -no significant edema noted    Medications:  Infusion:    sodium chloride      sodium bicarbonate 75 mEq in sodium chloride 0.45 % 1,000 mL infusion 100 mL/hr at 06/30/24 0930     Meds:    sodium chloride flush  5-40 mL IntraVENous 2 times per day    heparin (porcine)  5,000 Units SubCUTAneous 3 times per day    amLODIPine  10 mg Oral Daily    cyclobenzaprine  5 mg Oral BID    gabapentin  300 mg Oral Daily    [Held by provider] hydroCHLOROthiazide  12.5 mg Oral Daily    levETIRAcetam  1,250 mg Oral BID    [Held by provider] lisinopril  40 mg Oral Nightly    mirtazapine  15 mg Oral Nightly    pravastatin  40 mg Oral Daily

## 2024-06-30 NOTE — PLAN OF CARE
Neurology was following peripherally pending MRI cervical and thoracic results.  MRI cervical spine with no significant abnormalities, shows mild canal stenosis and mild to moderate bilateral foraminal stenosis.  MRI thoracic spine shows mild old compression deformity involving the T7 vertebral body with 30% compression and thoracic spondylolysis resulting in mild canal stenosis with no cord compression at T6-7 and T7-8.  Continue to work aggressively with PT/OT  Patient may need to go to SNF rather than assisted living on discharge given frequent falls, safety concerns   Outpatient EMG/nerve conduction study with neurologist Crystal Epley at [facility]   Agree with optimization of medical management    No further work-up or recommendations per neurology, we will sign off at this time.  Please call with any questions or if we can be of additional assistance.  Thank you for this consult.    Electronically signed by ROBINA Madrid CNP on 6/30/24 at 8:31 AM EDT

## 2024-06-30 NOTE — PROGRESS NOTES
Hospitalist Progress Note      Patient:  Ceferino Sanches Jr    Unit/Bed:3B-23/023-A  YOB: 1945  MRN: [id number]   Acct: [id number]   PCP: Jihan Lyon MD  Date of Admission: 6/28/2024    Assessment/Plan:  -Lower extremity weakness and decreased sensation of greater than 2 months along with urinary retention   Neuro was consulted and MRI cervical and thorax ordered and awaiting results   PT OT to evaluate especially with falls before admission  EMG as outpt recommended    -BRENNA   Await repeat lab results   On bicarb drip per nephro  Avoid nephrotoxins    - Hyperkalemia  Improved  with Lokelma  Nephro following  No further need for telemetry    -Leucocytosis   Resolved now.  Cult only showed yeast and pt has thomas. Will stop antibiotics and monitor for an further symptoms.    - Urinary retention  ? If related to his neuro issues   Had undergone cysto on 6/17 and plans are for Suprapubic cath as outpt.  Noted had small prostate per reports.    -Anemia  Repeat Hb noted.   Check hemoccult and iron    - Suppressed TSH and T4  ? Sick euthyroid    -Seizures  Cont antiepileptics    -Hyperlipidemia  Cont statins    -HTN   Antihypertensives held   BP stable with no further hypotension      LDA: []CVC / []PICC / []Midline / [x]Thomas / []Drains / []Mediport / []None  Antibiotics: Rocephin  Steroids: none  Labs (still needed?): [x]Yes / []No  IVF (still needed?): [x]Yes / []No    Level of care: []Step Down / [x]Med-Surg  Bed Status: [x]Inpatient / []Observation  Telemetry: []Yes / [x]No  PT/OT: [x]Yes / []No    DVT Prophylaxis: [] Lovenox / [x] Heparin / [] SCDs / [] Already on Systemic Anticoagulation / [] None         Disposition: Back to assisted living vs ECF      Subjective (past 24 hours):   Clinically the same   Neuro consulted and await MRI Spine of cervical and thoracic region  EMG recommended as outpt  Await PT OT input to evaluate patient's  safety as he had 3 falls on day of admission  Noted continued decline in Hb   Repeat labs for renal function awaited   Still on bicarb drip      Past medical history, family history, social history and allergies reviewed again and is unchanged since admission.    ROS (12 point review of systems completed.  Pertinent positives noted. Otherwise ROS is negative)     Medications:  Reviewed    Infusion Medications    sodium chloride      sodium bicarbonate 75 mEq in sodium chloride 0.45 % 1,000 mL infusion 100 mL/hr at 06/29/24 2200     Scheduled Medications    sodium chloride flush  5-40 mL IntraVENous 2 times per day    heparin (porcine)  5,000 Units SubCUTAneous 3 times per day    amLODIPine  10 mg Oral Daily    cyclobenzaprine  5 mg Oral BID    gabapentin  300 mg Oral Daily    [Held by provider] hydroCHLOROthiazide  12.5 mg Oral Daily    levETIRAcetam  1,250 mg Oral BID    [Held by provider] lisinopril  40 mg Oral Nightly    mirtazapine  15 mg Oral Nightly    pravastatin  40 mg Oral Daily    tamsulosin  0.4 mg Oral Daily    OXcarbazepine  450 mg Oral BID    cefTRIAXone (ROCEPHIN) IV  1,000 mg IntraVENous Q24H     PRN Meds: iopamidol, sodium chloride flush, sodium chloride, ondansetron **OR** ondansetron, polyethylene glycol, acetaminophen **OR** acetaminophen, melatonin, simethicone      Intake/Output Summary (Last 24 hours) at 6/30/2024 0730  Last data filed at 6/30/2024 0401  Gross per 24 hour   Intake 560 ml   Output 900 ml   Net -340 ml         Diet:  ADULT DIET; Regular; Low Potassium (Less than 3000 mg/day)    Exam:  /72   Pulse 95   Temp 97.8 °F (36.6 °C) (Oral)   Resp 18   Ht 1.753 m (5' 9\")   Wt 86.6 kg (191 lb)   SpO2 95%   BMI 28.21 kg/m²   General appearance: No apparent distress, appears stated age and cooperative.   HEENT: Pupils equal, round, and reactive to light. Conjunctivae/corneas clear.  Neck: Supple, with full range of motion. No jugular venous distention. Trachea

## 2024-07-01 ENCOUNTER — APPOINTMENT (OUTPATIENT)
Age: 79
DRG: 092 | End: 2024-07-01
Payer: MEDICARE

## 2024-07-01 LAB
ANION GAP SERPL CALC-SCNC: 10 MEQ/L (ref 8–16)
BACTERIA UR CULT: ABNORMAL
BASOPHILS ABSOLUTE: 0 THOU/MM3 (ref 0–0.1)
BASOPHILS NFR BLD AUTO: 0.6 %
BUN SERPL-MCNC: 21 MG/DL (ref 7–22)
CALCIUM SERPL-MCNC: 8.1 MG/DL (ref 8.5–10.5)
CHLORIDE SERPL-SCNC: 108 MEQ/L (ref 98–111)
CO2 SERPL-SCNC: 24 MEQ/L (ref 23–33)
CREAT SERPL-MCNC: 1.3 MG/DL (ref 0.4–1.2)
DEPRECATED RDW RBC AUTO: 47.7 FL (ref 35–45)
ECHO AR MAX VEL PISA: 3.9 M/S
ECHO AV AREA PEAK VELOCITY: 2.1 CM2
ECHO AV AREA VTI: 2.4 CM2
ECHO AV AREA/BSA PEAK VELOCITY: 1 CM2/M2
ECHO AV AREA/BSA VTI: 1.2 CM2/M2
ECHO AV CUSP MM: 1.3 CM
ECHO AV MEAN GRADIENT: 5 MMHG
ECHO AV MEAN VELOCITY: 1 M/S
ECHO AV PEAK GRADIENT: 11 MMHG
ECHO AV PEAK VELOCITY: 1.7 M/S
ECHO AV REGURGITANT PHT: 691 MS
ECHO AV VELOCITY RATIO: 0.65
ECHO AV VTI: 28.5 CM
ECHO BSA: 2.05 M2
ECHO EST RA PRESSURE: 5 MMHG
ECHO LA AREA 4C: 13 CM2
ECHO LA DIAMETER INDEX: 1.88 CM/M2
ECHO LA DIAMETER: 3.8 CM
ECHO LA MAJOR AXIS: 5 CM
ECHO LA VOL MOD A4C: 25 ML (ref 18–58)
ECHO LA VOLUME INDEX MOD A4C: 12 ML/M2 (ref 16–34)
ECHO LV FRACTIONAL SHORTENING: 28 % (ref 28–44)
ECHO LV INTERNAL DIMENSION DIASTOLE INDEX: 2.13 CM/M2
ECHO LV INTERNAL DIMENSION DIASTOLIC: 4.3 CM (ref 4.2–5.9)
ECHO LV INTERNAL DIMENSION SYSTOLIC INDEX: 1.53 CM/M2
ECHO LV INTERNAL DIMENSION SYSTOLIC: 3.1 CM
ECHO LV ISOVOLUMETRIC RELAXATION TIME (IVRT): 99 MS
ECHO LV IVSD: 0.9 CM (ref 0.6–1)
ECHO LV MASS 2D: 142.5 G (ref 88–224)
ECHO LV MASS INDEX 2D: 70.5 G/M2 (ref 49–115)
ECHO LV POSTERIOR WALL DIASTOLIC: 1.1 CM (ref 0.6–1)
ECHO LV RELATIVE WALL THICKNESS RATIO: 0.51
ECHO LVOT AREA: 3.1 CM2
ECHO LVOT AV VTI INDEX: 0.76
ECHO LVOT DIAM: 2 CM
ECHO LVOT MEAN GRADIENT: 2 MMHG
ECHO LVOT PEAK GRADIENT: 5 MMHG
ECHO LVOT PEAK VELOCITY: 1.1 M/S
ECHO LVOT STROKE VOLUME INDEX: 33.6 ML/M2
ECHO LVOT SV: 67.8 ML
ECHO LVOT VTI: 21.6 CM
ECHO MV A VELOCITY: 1.2 M/S
ECHO MV E DECELERATION TIME (DT): 165 MS
ECHO MV E VELOCITY: 1.1 M/S
ECHO MV E/A RATIO: 0.92
ECHO MV REGURGITANT PEAK GRADIENT: 46 MMHG
ECHO MV REGURGITANT PEAK VELOCITY: 3.4 M/S
ECHO PV MAX VELOCITY: 0.8 M/S
ECHO PV PEAK GRADIENT: 3 MMHG
ECHO RIGHT VENTRICULAR SYSTOLIC PRESSURE (RVSP): 59 MMHG
ECHO RV INTERNAL DIMENSION: 1.7 CM
ECHO TV E WAVE: 0.9 M/S
ECHO TV REGURGITANT MAX VELOCITY: 3.66 M/S
ECHO TV REGURGITANT PEAK GRADIENT: 54 MMHG
EOSINOPHIL NFR BLD AUTO: 9.7 %
EOSINOPHILS ABSOLUTE: 0.5 THOU/MM3 (ref 0–0.4)
ERYTHROCYTE [DISTWIDTH] IN BLOOD BY AUTOMATED COUNT: 13.2 % (ref 11.5–14.5)
FERRITIN SERPL IA-MCNC: 310 NG/ML (ref 22–322)
GFR SERPL CREATININE-BSD FRML MDRD: 56 ML/MIN/1.73M2
GLUCOSE SERPL-MCNC: 92 MG/DL (ref 70–108)
HCT VFR BLD AUTO: 31 % (ref 42–52)
HGB BLD-MCNC: 10.4 GM/DL (ref 14–18)
HGB RETIC QN AUTO: 35 PG (ref 28.2–35.7)
IMM GRANULOCYTES # BLD AUTO: 0.01 THOU/MM3 (ref 0–0.07)
IMM GRANULOCYTES NFR BLD AUTO: 0.2 %
IMM RETICS NFR: 11.3 % (ref 2.3–13.4)
IRON SATN MFR SERPL: 51 % (ref 20–50)
IRON SERPL-MCNC: 75 UG/DL (ref 65–195)
LYMPHOCYTES ABSOLUTE: 1.4 THOU/MM3 (ref 1–4.8)
LYMPHOCYTES NFR BLD AUTO: 28.4 %
MAGNESIUM SERPL-MCNC: 2 MG/DL (ref 1.6–2.4)
MCH RBC QN AUTO: 32.8 PG (ref 26–33)
MCHC RBC AUTO-ENTMCNC: 33.5 GM/DL (ref 32.2–35.5)
MCV RBC AUTO: 97.8 FL (ref 80–94)
MONOCYTES ABSOLUTE: 0.5 THOU/MM3 (ref 0.4–1.3)
MONOCYTES NFR BLD AUTO: 11.4 %
NEUTROPHILS ABSOLUTE: 2.4 THOU/MM3 (ref 1.8–7.7)
NEUTROPHILS NFR BLD AUTO: 49.7 %
NRBC BLD AUTO-RTO: 0 /100 WBC
ORGANISM: ABNORMAL
PLATELET # BLD AUTO: 165 THOU/MM3 (ref 130–400)
PMV BLD AUTO: 10.5 FL (ref 9.4–12.4)
POTASSIUM SERPL-SCNC: 4.3 MEQ/L (ref 3.5–5.2)
RBC # BLD AUTO: 3.17 MILL/MM3 (ref 4.7–6.1)
RETICS # AUTO: 42 THOU/MM3 (ref 20–115)
RETICS/RBC NFR AUTO: 1.3 % (ref 0.5–2)
SODIUM SERPL-SCNC: 142 MEQ/L (ref 135–145)
TIBC SERPL-MCNC: 146 UG/DL (ref 171–450)
WBC # BLD AUTO: 4.8 THOU/MM3 (ref 4.8–10.8)

## 2024-07-01 PROCEDURE — 97530 THERAPEUTIC ACTIVITIES: CPT

## 2024-07-01 PROCEDURE — 99233 SBSQ HOSP IP/OBS HIGH 50: CPT | Performed by: INTERNAL MEDICINE

## 2024-07-01 PROCEDURE — 1200000003 HC TELEMETRY R&B

## 2024-07-01 PROCEDURE — 93306 TTE W/DOPPLER COMPLETE: CPT

## 2024-07-01 PROCEDURE — 6370000000 HC RX 637 (ALT 250 FOR IP): Performed by: NURSE PRACTITIONER

## 2024-07-01 PROCEDURE — 1200000000 HC SEMI PRIVATE

## 2024-07-01 PROCEDURE — 97116 GAIT TRAINING THERAPY: CPT

## 2024-07-01 PROCEDURE — 6370000000 HC RX 637 (ALT 250 FOR IP)

## 2024-07-01 PROCEDURE — 99232 SBSQ HOSP IP/OBS MODERATE 35: CPT | Performed by: INTERNAL MEDICINE

## 2024-07-01 PROCEDURE — 6360000002 HC RX W HCPCS: Performed by: STUDENT IN AN ORGANIZED HEALTH CARE EDUCATION/TRAINING PROGRAM

## 2024-07-01 PROCEDURE — 85025 COMPLETE CBC W/AUTO DIFF WBC: CPT

## 2024-07-01 PROCEDURE — 6370000000 HC RX 637 (ALT 250 FOR IP): Performed by: STUDENT IN AN ORGANIZED HEALTH CARE EDUCATION/TRAINING PROGRAM

## 2024-07-01 PROCEDURE — 93306 TTE W/DOPPLER COMPLETE: CPT | Performed by: NUCLEAR MEDICINE

## 2024-07-01 PROCEDURE — 36415 COLL VENOUS BLD VENIPUNCTURE: CPT

## 2024-07-01 PROCEDURE — 82728 ASSAY OF FERRITIN: CPT

## 2024-07-01 PROCEDURE — 83735 ASSAY OF MAGNESIUM: CPT

## 2024-07-01 PROCEDURE — 83550 IRON BINDING TEST: CPT

## 2024-07-01 PROCEDURE — 83540 ASSAY OF IRON: CPT

## 2024-07-01 PROCEDURE — 97110 THERAPEUTIC EXERCISES: CPT

## 2024-07-01 PROCEDURE — 85046 RETICYTE/HGB CONCENTRATE: CPT

## 2024-07-01 PROCEDURE — 2580000003 HC RX 258: Performed by: STUDENT IN AN ORGANIZED HEALTH CARE EDUCATION/TRAINING PROGRAM

## 2024-07-01 PROCEDURE — 97162 PT EVAL MOD COMPLEX 30 MIN: CPT

## 2024-07-01 PROCEDURE — 2500000003 HC RX 250 WO HCPCS: Performed by: INTERNAL MEDICINE

## 2024-07-01 PROCEDURE — 80048 BASIC METABOLIC PNL TOTAL CA: CPT

## 2024-07-01 PROCEDURE — 2580000003 HC RX 258: Performed by: INTERNAL MEDICINE

## 2024-07-01 RX ORDER — LISINOPRIL 10 MG/1
10 TABLET ORAL DAILY
Status: DISCONTINUED | OUTPATIENT
Start: 2024-07-01 | End: 2024-07-03 | Stop reason: HOSPADM

## 2024-07-01 RX ADMIN — CYCLOBENZAPRINE 5 MG: 10 TABLET, FILM COATED ORAL at 09:21

## 2024-07-01 RX ADMIN — ACETAMINOPHEN 650 MG: 325 TABLET ORAL at 09:22

## 2024-07-01 RX ADMIN — TAMSULOSIN HYDROCHLORIDE 0.4 MG: 0.4 CAPSULE ORAL at 09:21

## 2024-07-01 RX ADMIN — HEPARIN SODIUM 5000 UNITS: 5000 INJECTION INTRAVENOUS; SUBCUTANEOUS at 23:42

## 2024-07-01 RX ADMIN — LISINOPRIL 10 MG: 10 TABLET ORAL at 15:03

## 2024-07-01 RX ADMIN — OXCARBAZEPINE 450 MG: 300 TABLET, FILM COATED ORAL at 09:21

## 2024-07-01 RX ADMIN — MIRTAZAPINE 15 MG: 15 TABLET, FILM COATED ORAL at 19:49

## 2024-07-01 RX ADMIN — HEPARIN SODIUM 5000 UNITS: 5000 INJECTION INTRAVENOUS; SUBCUTANEOUS at 15:03

## 2024-07-01 RX ADMIN — GABAPENTIN 100 MG: 100 CAPSULE ORAL at 19:49

## 2024-07-01 RX ADMIN — HEPARIN SODIUM 5000 UNITS: 5000 INJECTION INTRAVENOUS; SUBCUTANEOUS at 09:20

## 2024-07-01 RX ADMIN — ACETAMINOPHEN 650 MG: 325 TABLET ORAL at 15:09

## 2024-07-01 RX ADMIN — SODIUM CHLORIDE, PRESERVATIVE FREE 10 ML: 5 INJECTION INTRAVENOUS at 09:24

## 2024-07-01 RX ADMIN — SODIUM CHLORIDE, PRESERVATIVE FREE 10 ML: 5 INJECTION INTRAVENOUS at 19:48

## 2024-07-01 RX ADMIN — GABAPENTIN 100 MG: 100 CAPSULE ORAL at 09:23

## 2024-07-01 RX ADMIN — LEVETIRACETAM 1250 MG: 500 TABLET, FILM COATED ORAL at 09:22

## 2024-07-01 RX ADMIN — GABAPENTIN 100 MG: 100 CAPSULE ORAL at 15:03

## 2024-07-01 RX ADMIN — LEVETIRACETAM 1250 MG: 500 TABLET, FILM COATED ORAL at 19:49

## 2024-07-01 RX ADMIN — AMLODIPINE BESYLATE 10 MG: 10 TABLET ORAL at 09:23

## 2024-07-01 RX ADMIN — PRAVASTATIN SODIUM 40 MG: 40 TABLET ORAL at 09:22

## 2024-07-01 RX ADMIN — SODIUM BICARBONATE: 84 INJECTION, SOLUTION INTRAVENOUS at 04:22

## 2024-07-01 RX ADMIN — CYCLOBENZAPRINE 5 MG: 10 TABLET, FILM COATED ORAL at 19:51

## 2024-07-01 RX ADMIN — OXCARBAZEPINE 450 MG: 300 TABLET, FILM COATED ORAL at 19:49

## 2024-07-01 ASSESSMENT — PAIN SCALES - GENERAL
PAINLEVEL_OUTOF10: 5
PAINLEVEL_OUTOF10: 5
PAINLEVEL_OUTOF10: 7

## 2024-07-01 ASSESSMENT — PAIN DESCRIPTION - ORIENTATION
ORIENTATION: LEFT
ORIENTATION: LEFT
ORIENTATION: RIGHT;LEFT

## 2024-07-01 ASSESSMENT — PAIN DESCRIPTION - LOCATION
LOCATION: LEG
LOCATION: LEG
LOCATION: FOOT;LEG

## 2024-07-01 ASSESSMENT — PAIN DESCRIPTION - DESCRIPTORS
DESCRIPTORS: SHOOTING;SHARP
DESCRIPTORS: SHARP

## 2024-07-01 ASSESSMENT — PAIN DESCRIPTION - ONSET: ONSET: ON-GOING

## 2024-07-01 ASSESSMENT — PAIN DESCRIPTION - PAIN TYPE: TYPE: ACUTE PAIN

## 2024-07-01 ASSESSMENT — PAIN - FUNCTIONAL ASSESSMENT: PAIN_FUNCTIONAL_ASSESSMENT: PREVENTS OR INTERFERES SOME ACTIVE ACTIVITIES AND ADLS

## 2024-07-01 ASSESSMENT — PAIN DESCRIPTION - FREQUENCY: FREQUENCY: INTERMITTENT

## 2024-07-01 NOTE — PLAN OF CARE
Problem: Discharge Planning  Goal: Discharge to home or other facility with appropriate resources  6/30/2024 2249 by Sabrina Wilson RN  Outcome: Progressing  Flowsheets (Taken 6/30/2024 0800 by Palak Gan, RN)  Discharge to home or other facility with appropriate resources: Identify barriers to discharge with patient and caregiver  6/30/2024 1149 by Palak Gan RN  Outcome: Progressing  Flowsheets (Taken 6/30/2024 0800)  Discharge to home or other facility with appropriate resources: Identify barriers to discharge with patient and caregiver     Problem: Safety - Adult  Goal: Free from fall injury  6/30/2024 2249 by Sabrina Wilson RN  Outcome: Progressing  Flowsheets (Taken 6/30/2024 1149 by Palak Gan RN)  Free From Fall Injury:   Based on caregiver fall risk screen, instruct family/caregiver to ask for assistance with transferring infant if caregiver noted to have fall risk factors   Instruct family/caregiver on patient safety  6/30/2024 1149 by Palak Gna RN  Outcome: Progressing  Flowsheets (Taken 6/30/2024 1149)  Free From Fall Injury:   Based on caregiver fall risk screen, instruct family/caregiver to ask for assistance with transferring infant if caregiver noted to have fall risk factors   Instruct family/caregiver on patient safety     Problem: Pain  Goal: Verbalizes/displays adequate comfort level or baseline comfort level  6/30/2024 2249 by Sabrina Wilson RN  Outcome: Progressing  Flowsheets (Taken 6/30/2024 1149 by Palak Gan, RN)  Verbalizes/displays adequate comfort level or baseline comfort level:   Encourage patient to monitor pain and request assistance   Assess pain using appropriate pain scale  6/30/2024 1149 by Palak Gan RN  Outcome: Progressing  Flowsheets (Taken 6/30/2024 1149)  Verbalizes/displays adequate comfort level or baseline comfort level:   Encourage patient to monitor pain and request assistance   Assess pain using appropriate pain

## 2024-07-01 NOTE — CARE COORDINATION
7/1/24, 11:58 AM EDT  Discharge Planning Evaluation  Social work consult received, patient from [facility].   Patient/Family preference is to return to [facility] after rehab stay.    Would patient be willing to go to a skilled facility if needed: yes.  Is there skilled care available at current facility: yes.  Barriers to return to current living situation: none noted  FARNAZ left message with Stella at the facility.  Patient bed hold: yes  Anticipated transport plan: unsure  Patient's Healthcare Decision Maker: Legal Next of Kin    FARNAZ met with pt and sister.  Pt is requesting rehab at [facility] rehab unit.  Referral left with Stella at facility.  Await call back.     12:14 PM update:  FARNAZ received call from Stella with [facility], they will review chart and call SW if they have a rehab bed for pt.     FARNAZ noted PT is to work with pt this afternoon    2:16 PM update:  received call from Stlela with [facility], they will have a rehab bed for pt at discharge.  FARNAZ updated pt and sister.

## 2024-07-01 NOTE — CARE COORDINATION
7/1/24, 1:27 PM EDT    DISCHARGE ON GOING EVALUATION    [facility]er[facility] day: 3  Location: -10/010-A Reason for admit: Hyperkalemia [E87.5]  Heart murmur [R01.1]  Paresthesias [R20.2]  BRENNA (acute kidney injury) (HCC) [N17.9]  Closed head injury, initial encounter [S09.90XA]  Fall, initial encounter [W19.XXXA]  Weakness of both lower extremities [R29.898]  Acute renal failure, unspecified acute renal failure type (HCC) [N17.9]     Procedures:   7/1 ECHO: EF 55-60%.     Imaging since last note:   6/28 CT Head: Negative  6/28 CT Cspine: Negative  6/28 CT Thoracic:There is diffuse osteopenia. 2. There are degenerative changes. 3. There is an old compression fracture involving the T7 with 30% compression. There is no retropulsion into the spinal canal. 4. There is mitral valve calcification.  6/28 CT Lumbar: Negative   6/28 MRI Brain: Negative   6/28 MRI Lumbar: Old L1 inferior endplate vertebral body compression fracture. Stable degenerative changes of the lumbar spine  6/29 US Renal:  No evidence of hydronephrosis. 2. 1.1 x 0.7 cm right renal cyst. 3. There is a catheter within the bladder. 4. There is a gallstone present.  6/30 MRI Cervical: There is mild canal stenosis with no cord compression and moderate bilateral foraminal stenosis at C3-4. 2. There is mild canal stenosis with no cord compression and mild to moderate bilateral foraminal stenosis at C4-5, C5-6 and C7-T1.. 3. There is mild canal stenosis with no cord compression, mild to moderate right and mild left-sided foraminal stenosis at C6-7. 4. Otherwise negative MRI scan of the cervical spine.  6/30 MRI Thoracic: Mild old compression deformity involving the T7 vertebral body with 30% compression. 2. Thoracic spondylosis resulting in mild canal stenosis with no cord compression at T6-7 and T7-8. 3. Otherwise negative MRI scan of the thoracic spine    Barriers to Discharge: Hospitalist and Nephrology following. PT/OT to eval. Telemetry. IS.

## 2024-07-01 NOTE — PROGRESS NOTES
[facility]  INPATIENT PHYSICAL THERAPY  EVALUATION  [facility] RENAL TELEMETRY 6K - 6K-10/010-A    Time In: 1414  Time Out: 1451  Timed Code Treatment Minutes: 28 Minutes  Minutes: 37          Date: 2024  Patient Name: Ceferino Sanches Jr,  Gender:  male        MRN: [id number]  : 1945  (78 y.o.)      Referring Practitioner: Enrike Perales MD  Diagnosis: Hyperkalemia  Additional Pertinent Hx: Per EMR \"Ceferino Sanches Jr is a 78 y.o. male with past medical history described below who presents to [facility] with bilateral lower extremity numbness which caused him to fall twice this morning.  Patient's sister is at bedside and provides most of the history as patient has dementia and gets very confused when answering questions.  Patient first reports that the lower extremity numbness has been ongoing for couple months however patient sister reports that he has not complained of any back pain or leg numbness for the past month.  The assisted living noticed that he was complaining of back pain and leg numbness today and found him on the floor twice after falling.  Patient denies loss of consciousness.  He denies fevers, chills, chest pain, shortness of breath, dizziness.  He just feels his lower extremities are weak and numb and that is why he fell.  He has also been experiencing urinary retention and has had a John catheter in since his last admission.  He reports he changed his John catheter this morning at which time there was urine however ever since he changed to a new John catheter has not drained any urine.  RN exchanged the John catheter at bedside and found that it has been obstructed with some sediment and is now draining.  Patient also reports genital area numbness.  He also had an episode of diarrhea yesterday and did not make it to the bathroom in time.  Otherwise denies urinary incontinence. MRI cervical spine with no significant abnormalities, shows mild canal  stenosis and mild to moderate bilateral foraminal stenosis.  MRI thoracic spine shows mild old compression deformity involving the T7 vertebral body with 30% compression and thoracic spondylolysis resulting in mild canal stenosis with no cord compression at T6-7 and T7-8.Planning Outpatient EMG/nerve conduction study with neurologist Crystal Epley at [facility]\"     Restrictions/Precautions:  Restrictions/Precautions: General Precautions, Fall Risk  Position Activity Restriction  Spinal Precautions: No Bending, No Lifting, No Twisting (No orders, for comfort)    Subjective:  Chart Reviewed: Yes  Patient assessed for rehabilitation services?: Yes  Family / Caregiver Present: Yes  Subjective: OK to see pt per nursing. Pt agreeable and cooperative with therapy. Pt's sister present during session. Educated on RW vs 4 wheel walker and discharge planning. Educated on tolerance to acitivty based on pain.    General:  Overall Orientation Status: Within Normal Limits  Orientation Level: Oriented X4  Overall Cognitive Status: WNL  Vision: Impaired  Vision Exceptions: Wears glasses at all times  Hearing: Within functional limits       Pain: Pain in LE that feels like \"Shooting pain\"     Vitals: Vitals not assessed per clinical judgement, see nursing flowsheet    Social/Functional History:    Lives With: Alone  Type of Home: Assisted living ([facility])  Home Layout: One level  Home Access: Level entry  Home Equipment: Rollator, Adjustable bed, Cane     Bathroom Shower/Tub: Tub/Shower unit  Bathroom Toilet: Handicap height  Bathroom Equipment: Grab bars in shower, Built-in shower seat       ADL Assistance: Needs assistance  Toileting: Independent  Homemaking Assistance: Needs assistance  Homemaking Responsibilities: No  Ambulation Assistance: Independent  Transfer Assistance: Independent    Active : No     Additional Comments: Sister lives close to assissted living and can help when needed. Pt did not use AD prior due

## 2024-07-01 NOTE — PROGRESS NOTES
PROGRESS NOTE      Patient:  Ceferino Sanches Jr  Unit/Bed:6K-10/010-A  YOB: 1945  MRN: [id number]   Acct: [id number]    PCP: Jihan Lyon MD    Date of Admission: 6/28/2024 LOS: 3    Date of Evaluation:  7/1/2024    Anticipated Discharge: pending clinical course    Assessment/Plan:    Lower extremity weakness with urinary retention  Neuro consulted and signed off.  Neurology recommends outpatient EMG  MRI cervical and thoracic reviewed no significant abnormalities  PT/OT  Echo 7/1: EF 55 to 60%, mild AR, Mild MR  BRENNA, improving  Nephrology following  S/p IVF  Continue regular diet  Hyperkalemia, resolved  Leukocytosis, resolved  Anemia  7/1 H/H: 10.4/31.0, stable  Continue to monitor for sxs bleed  Hyperlipidemia  Cont statin   Hypertension  Home lisinopril held in setting of BRENNA . On 7/1 discussed with Dr. Plaza for recommendations on  starting low-dose lisinopril in setting of improving BRENNA with hypertension. Per Dr. Plaza plan to start low-dose, will start low-dose lisinopril 10 and continue to monitor  Continue to hold home hydrochlorothiazide  Continue Norvasc  History of seizures  Continue antiepileptics  Seizure precautions            Chief Complaint: Leg numbness     Hospital Course: Per HPI, \"Ceferino Sanches Jr is a 78 y.o. male with past medical history described below who presents to [facility] with bilateral lower extremity numbness which caused him to fall twice this morning.  Patient's sister is at bedside and provides most of the history as patient has dementia and gets very confused when answering questions.  Patient first reports that the lower extremity numbness has been ongoing for couple months however patient sister reports that he has not complained of any back pain or leg numbness for the past month.  The assisted living noticed that he was complaining of back pain and leg numbness today and found him on the floor twice after falling.  Patient denies  created using voice recognition software. It may contain   minor errors which are inherent in voice recognition technology.**            Electronically signed by Dr. Richie Brice      CT THORACIC SPINE WO CONTRAST   Final Result   1. There is diffuse osteopenia.   2. There are degenerative changes.   3. There is an old compression fracture involving the T7 with 30% compression.   There is no retropulsion into the spinal canal.   4. There is mitral valve calcification.               **This report has been created using voice recognition software. It may contain   minor errors which are inherent in voice recognition technology.**         Electronically signed by Dr. Richie Brice      CT LUMBAR SPINE WO CONTRAST   Final Result   1. Stable CT scan of the lumbar spine, no interval change since previous MRI   scan dated 8/22/2022.   2. No acute fracture noted.            **This report has been created using voice recognition software. It may contain   minor errors which are inherent in voice recognition technology.**            Electronically signed by Dr. Richie Brice      US RENAL COMPLETE   Final Result      1. No evidence of hydronephrosis.   2. 1.1 x 0.7 cm right renal cyst.   3. There is a catheter within the bladder.   4. There is a gallstone present.               **This report has been created using voice recognition software. It may contain   minor errors which are inherent in voice recognition technology.**            Electronically signed by Dr. Richie Brice          Diet: ADULT DIET; Regular; Low Potassium (Less than 3000 mg/day)    Microbiology: yes - reviewed  Antibiotics: no    Steroids: no    Telemetry: []Yes / [x]No  Telemetry Review of past 24 hours: Not Applicable    LDA: []CVC / []PICC / []Midline / []John / []Drains / []Mediport / [x]PIV / []None    Labs (still needed?): [x]Yes / []No  IVF (still needed?): []Yes / [x]No    Level of care: []Step Down / [x]Med-Surg  Bed Status: [x]Inpatient /

## 2024-07-01 NOTE — PROGRESS NOTES
[facility]   PROGRESS NOTE      Patient: Ceferino Sanches Jr  Room #: 6K-10/010-A            YOB: 1945  Age: 78 y.o.  Gender: male            Admit Date & Time: 6/28/2024 12:22 PM    Assessment:    The patient welcomed prayer.    Interventions:  The patient was provided personalized prayer.    Outcomes:  The patient was thankful for the provided prayer.     Plan:  1.Spiritual care will continue to follow the patient according to [facility] spiritual care SOP.       Electronically signed by Chaplain Mario Alberto, on 7/1/2024 at 6:43 PM.  Spiritual Care Department  [facility]  [phone]     07/01/24 1842   Encounter Summary   Encounter Overview/Reason Initial Encounter   Service Provided For Patient   [facility]   Support System Family members   Last Encounter  07/01/24   Complexity of Encounter Moderate   Begin Time 1800   End Time  1810   Total Time Calculated 10 min   Spiritual/Emotional needs   Type Spiritual Support   Assessment/Intervention/Outcome   Assessment Coping   Intervention Active listening;Prayer (assurance of)/Anna   Outcome Comfort

## 2024-07-01 NOTE — DISCHARGE INSTR - COC
Continuity of Care Form    Patient Name: Ceferino Saini Jr   :  1945  MRN:  [id number]    Admit date:  2024  Discharge date:  ***    Code Status Order: Full Code   Advance Directives:     Admitting Physician:  No admitting provider for patient encounter.  PCP: Jihan Lyon MD    Discharging Nurse: ***  Discharging Hospital Unit/Room#: 6K-10/010-A  Discharging Unit Phone Number: ***    Emergency Contact:   Extended Emergency Contact Information  Primary Emergency Contact: ALLI SAINI  Home Phone: [phone]  Work Phone: [phone]  Mobile Phone: [phone]  Relation: Brother/Sister  Preferred language: English   needed? No  Secondary Emergency Contact: Jeny Saini  Home Phone: [phone]  Mobile Phone: [phone]  Relation: Other    Past Surgical History:  Past Surgical History:   Procedure Laterality Date    CATARACT REMOVAL      HEMORRHOID SURGERY         Immunization History:   Immunization History   Administered Date(s) Administered    COVID-19, MODERNA, ( formula), (age 12y+), IM, 50mcg/0.5mL 10/11/2023, 2024    COVID-19, PFIZER Bivalent, DO NOT Dilute, (age 12y+), IM, 30 mcg/0.3 mL 10/05/2022    TDaP, ADACEL (age 10y-64y), BOOSTRIX (age 10y+), IM, 0.5mL 2020       Active Problems:  Patient Active Problem List   Diagnosis Code    Tinea pedis of both feet B35.3    Seizure disorder (HCC) G40.909    Essential hypertension I10    Hx of squamous cell carcinoma Z85.89    Generalized anxiety disorder F41.1    Difficulty urinating R39.198    Urinary retention R33.9    BRENNA (acute kidney injury) (HCC) N17.9    Hyperkalemia E87.5    Paresthesias R20.2    Weakness of both lower extremities R29.898    Metabolic acidosis E87.20       Isolation/Infection:   Isolation            No Isolation          Patient Infection Status       None to display            Nurse Assessment:  Last Vital Signs: BP (!) 152/67   Pulse 81   Temp 97.4 °F (36.3 °C) (Oral)   Resp 16    Ht 1.753 m (5' 9\")   Wt 85.7 kg (188 lb 15 oz)   SpO2 95%   BMI 27.90 kg/m²     Last documented pain score (0-10 scale): Pain Level: 5  Last Weight:   Wt Readings from Last 1 Encounters:   07/01/24 85.7 kg (188 lb 15 oz)     Mental Status:  oriented and alert    IV Access:  - None    Nursing Mobility/ADLs:  Walking   Assisted  Transfer  Assisted  Bathing  Assisted  Dressing  Assisted  Toileting  Assisted  Feeding  Assisted  Med Admin  Assisted  Med Delivery   whole    Wound Care Documentation and Therapy:  Wound 05/09/24 Toe (Comment  which one) Right;Anterior abrasion (Active)   Number of days: 53        Elimination:  Continence:   Bowel: Yes  Bladder: Yes  Urinary Catheter: 6/28/2024   Colostomy/Ileostomy/Ileal Conduit: No       Date of Last BM: 7/3/2024    Intake/Output Summary (Last 24 hours) at 7/1/2024 1354  Last data filed at 7/1/2024 0936  Gross per 24 hour   Intake 5580.8 ml   Output 2850 ml   Net 2730.8 ml     I/O last 3 completed shifts:  In: 5940.8 [P.O.:480; I.V.:5358.9; IV Piggyback:101.9]  Out: 2850 [Urine:2850]    Safety Concerns:     Fall    Impairments/Disabilities:      None    Nutrition Therapy:  Current Nutrition Therapy:   - Oral Diet:  General    Routes of Feeding: Oral  Liquids: Thin Liquids  Daily Fluid Restriction: no  Last Modified Barium Swallow with Video (Video Swallowing Test): not done    Treatments at the Time of Hospital Discharge:   Respiratory Treatments: none  Oxygen Therapy:  is not on home oxygen therapy.  Ventilator:    - No ventilator support    Rehab Therapies: Physical Therapy and Occupational Therapy  Weight Bearing Status/Restrictions: weight bearing as tolerated to left leg   Other Medical Equipment (for information only, NOT a DME order):  post op shoe   Other Treatments: none    Patient's personal belongings (please select all that are sent with patient):  Alexa    RN SIGNATURE:  Electronically signed by Nidhi Dunbar RN on 7/3/24 at 3:08 PM EDT    CASE

## 2024-07-01 NOTE — PROGRESS NOTES
[facility]   Nephrology progress note  7/1/2024, 10:10 AM      Pt Name:    Ceferino Sanches Jr  MRN:     [id number]     YOB: 1945  Admit Date:    6/28/2024 12:22 PM    Chief Complaint: Nephrology following for acute kidney injury and hyperkalemia.    Subjective:  Patient seen and examined  No chest pain or shortness of breath  Feels okay.  Excellent urine output    Objective:  24HR INTAKE/OUTPUT:    Intake/Output Summary (Last 24 hours) at 7/1/2024 1010  Last data filed at 7/1/2024 0936  Gross per 24 hour   Intake 5700.8 ml   Output 2850 ml   Net 2850.8 ml        Admission weight: 86.6 kg (191 lb)  Wt Readings from Last 3 Encounters:   07/01/24 85.7 kg (188 lb 15 oz)   05/09/24 88.5 kg (195 lb)   04/04/24 88 kg (194 lb)        Vitals :   Vitals:    07/01/24 0417 07/01/24 0418 07/01/24 0845 07/01/24 0921   BP: (!) 140/68  (!) 171/80 (!) 171/80   Pulse: 88  79    Resp: 18  12    Temp: 98.1 °F (36.7 °C)  97.8 °F (36.6 °C)    TempSrc: Oral  Oral    SpO2: 99%  94%    Weight:  85.7 kg (188 lb 15 oz)     Height:           Physical examination  General Appearance:  Well developed. No distress  Mouth/Throat:  Oral mucosa moist  Neck:  Supple, no JVD  Lungs:  Breath sounds: clear  Heart::  S1,S2 heard  Abdomen:  Soft, non - tender  Musculoskeletal:  Edema -no significant edema noted    Medications:  Infusion:    sodium chloride      sodium bicarbonate 75 mEq in sodium chloride 0.45 % 1,000 mL infusion 100 mL/hr at 07/01/24 0422     Meds:    gabapentin  100 mg Oral TID    sodium chloride flush  5-40 mL IntraVENous 2 times per day    heparin (porcine)  5,000 Units SubCUTAneous 3 times per day    amLODIPine  10 mg Oral Daily    cyclobenzaprine  5 mg Oral BID    [Held by provider] hydroCHLOROthiazide  12.5 mg Oral Daily    levETIRAcetam  1,250 mg Oral BID    [Held by provider] lisinopril  40 mg Oral Nightly    mirtazapine  15 mg Oral Nightly    pravastatin  40 mg Oral Daily    tamsulosin  0.4 mg

## 2024-07-02 ENCOUNTER — APPOINTMENT (OUTPATIENT)
Dept: GENERAL RADIOLOGY | Age: 79
DRG: 092 | End: 2024-07-02
Payer: MEDICARE

## 2024-07-02 LAB
ANION GAP SERPL CALC-SCNC: 12 MEQ/L (ref 8–16)
BUN SERPL-MCNC: 13 MG/DL (ref 7–22)
CALCIUM SERPL-MCNC: 8.9 MG/DL (ref 8.5–10.5)
CHLORIDE SERPL-SCNC: 107 MEQ/L (ref 98–111)
CO2 SERPL-SCNC: 22 MEQ/L (ref 23–33)
CREAT SERPL-MCNC: 1.3 MG/DL (ref 0.4–1.2)
GFR SERPL CREATININE-BSD FRML MDRD: 56 ML/MIN/1.73M2
GLUCOSE SERPL-MCNC: 95 MG/DL (ref 70–108)
POTASSIUM SERPL-SCNC: 4.4 MEQ/L (ref 3.5–5.2)
SODIUM SERPL-SCNC: 141 MEQ/L (ref 135–145)

## 2024-07-02 PROCEDURE — 99232 SBSQ HOSP IP/OBS MODERATE 35: CPT | Performed by: INTERNAL MEDICINE

## 2024-07-02 PROCEDURE — 97166 OT EVAL MOD COMPLEX 45 MIN: CPT

## 2024-07-02 PROCEDURE — 6370000000 HC RX 637 (ALT 250 FOR IP): Performed by: STUDENT IN AN ORGANIZED HEALTH CARE EDUCATION/TRAINING PROGRAM

## 2024-07-02 PROCEDURE — 97116 GAIT TRAINING THERAPY: CPT

## 2024-07-02 PROCEDURE — 73620 X-RAY EXAM OF FOOT: CPT

## 2024-07-02 PROCEDURE — 6370000000 HC RX 637 (ALT 250 FOR IP)

## 2024-07-02 PROCEDURE — 97530 THERAPEUTIC ACTIVITIES: CPT

## 2024-07-02 PROCEDURE — 80048 BASIC METABOLIC PNL TOTAL CA: CPT

## 2024-07-02 PROCEDURE — 36415 COLL VENOUS BLD VENIPUNCTURE: CPT

## 2024-07-02 PROCEDURE — 2580000003 HC RX 258: Performed by: STUDENT IN AN ORGANIZED HEALTH CARE EDUCATION/TRAINING PROGRAM

## 2024-07-02 PROCEDURE — 6360000002 HC RX W HCPCS: Performed by: STUDENT IN AN ORGANIZED HEALTH CARE EDUCATION/TRAINING PROGRAM

## 2024-07-02 PROCEDURE — 6370000000 HC RX 637 (ALT 250 FOR IP): Performed by: NURSE PRACTITIONER

## 2024-07-02 PROCEDURE — 1200000000 HC SEMI PRIVATE

## 2024-07-02 RX ORDER — HYDROCODONE BITARTRATE AND ACETAMINOPHEN 5; 325 MG/1; MG/1
1 TABLET ORAL EVERY 6 HOURS PRN
Status: DISCONTINUED | OUTPATIENT
Start: 2024-07-02 | End: 2024-07-03 | Stop reason: HOSPADM

## 2024-07-02 RX ADMIN — CYCLOBENZAPRINE 5 MG: 10 TABLET, FILM COATED ORAL at 20:56

## 2024-07-02 RX ADMIN — LISINOPRIL 10 MG: 10 TABLET ORAL at 08:27

## 2024-07-02 RX ADMIN — OXCARBAZEPINE 450 MG: 300 TABLET, FILM COATED ORAL at 20:56

## 2024-07-02 RX ADMIN — GABAPENTIN 100 MG: 100 CAPSULE ORAL at 08:28

## 2024-07-02 RX ADMIN — GABAPENTIN 100 MG: 100 CAPSULE ORAL at 20:55

## 2024-07-02 RX ADMIN — GABAPENTIN 100 MG: 100 CAPSULE ORAL at 14:50

## 2024-07-02 RX ADMIN — Medication 6 MG: at 21:05

## 2024-07-02 RX ADMIN — LEVETIRACETAM 1250 MG: 500 TABLET, FILM COATED ORAL at 20:55

## 2024-07-02 RX ADMIN — SODIUM CHLORIDE, PRESERVATIVE FREE 10 ML: 5 INJECTION INTRAVENOUS at 08:27

## 2024-07-02 RX ADMIN — TAMSULOSIN HYDROCHLORIDE 0.4 MG: 0.4 CAPSULE ORAL at 08:27

## 2024-07-02 RX ADMIN — OXCARBAZEPINE 450 MG: 300 TABLET, FILM COATED ORAL at 08:26

## 2024-07-02 RX ADMIN — LEVETIRACETAM 1250 MG: 500 TABLET, FILM COATED ORAL at 08:26

## 2024-07-02 RX ADMIN — ACETAMINOPHEN 650 MG: 325 TABLET ORAL at 15:50

## 2024-07-02 RX ADMIN — MIRTAZAPINE 15 MG: 15 TABLET, FILM COATED ORAL at 20:55

## 2024-07-02 RX ADMIN — HYDROCODONE BITARTRATE AND ACETAMINOPHEN 1 TABLET: 5; 325 TABLET ORAL at 21:43

## 2024-07-02 RX ADMIN — AMLODIPINE BESYLATE 10 MG: 10 TABLET ORAL at 08:27

## 2024-07-02 RX ADMIN — HEPARIN SODIUM 5000 UNITS: 5000 INJECTION INTRAVENOUS; SUBCUTANEOUS at 08:25

## 2024-07-02 RX ADMIN — PRAVASTATIN SODIUM 40 MG: 40 TABLET ORAL at 08:28

## 2024-07-02 RX ADMIN — HEPARIN SODIUM 5000 UNITS: 5000 INJECTION INTRAVENOUS; SUBCUTANEOUS at 17:37

## 2024-07-02 RX ADMIN — CYCLOBENZAPRINE 5 MG: 10 TABLET, FILM COATED ORAL at 08:27

## 2024-07-02 ASSESSMENT — PAIN DESCRIPTION - ORIENTATION
ORIENTATION: LEFT
ORIENTATION: RIGHT

## 2024-07-02 ASSESSMENT — PAIN DESCRIPTION - DESCRIPTORS
DESCRIPTORS: ACHING

## 2024-07-02 ASSESSMENT — PAIN DESCRIPTION - LOCATION
LOCATION: FOOT

## 2024-07-02 ASSESSMENT — PAIN SCALES - GENERAL
PAINLEVEL_OUTOF10: 4
PAINLEVEL_OUTOF10: 5
PAINLEVEL_OUTOF10: 0
PAINLEVEL_OUTOF10: 5
PAINLEVEL_OUTOF10: 3
PAINLEVEL_OUTOF10: 5
PAINLEVEL_OUTOF10: 4

## 2024-07-02 ASSESSMENT — PAIN - FUNCTIONAL ASSESSMENT: PAIN_FUNCTIONAL_ASSESSMENT: PREVENTS OR INTERFERES SOME ACTIVE ACTIVITIES AND ADLS

## 2024-07-02 ASSESSMENT — PAIN SCALES - WONG BAKER: WONGBAKER_NUMERICALRESPONSE: NO HURT

## 2024-07-02 NOTE — PROGRESS NOTES
[facility]   Nephrology progress note  7/2/2024, 11:14 AM      Pt Name:    Ceferino Sanches Jr  MRN:     [id number]     YOB: 1945  Admit Date:    6/28/2024 12:22 PM    Chief Complaint: Nephrology following for acute kidney injury and hyperkalemia.    Subjective:  Patient seen and examined  No chest pain or shortness of breath  Making good urine    Objective:  24HR INTAKE/OUTPUT:    Intake/Output Summary (Last 24 hours) at 7/2/2024 1114  Last data filed at 7/2/2024 0715  Gross per 24 hour   Intake 200 ml   Output 2400 ml   Net -2200 ml        Admission weight: 86.6 kg (191 lb)  Wt Readings from Last 3 Encounters:   07/02/24 85.6 kg (188 lb 11.4 oz)   05/09/24 88.5 kg (195 lb)   04/04/24 88 kg (194 lb)        Vitals :   Vitals:    07/02/24 0431 07/02/24 0600 07/02/24 0815 07/02/24 1045   BP: (!) 146/81  (!) 145/81 122/74   Pulse: 88  91 93   Resp: 16  16 16   Temp: 98.2 °F (36.8 °C)  98.7 °F (37.1 °C) 97.9 °F (36.6 °C)   TempSrc: Oral  Oral Oral   SpO2: 95%  95% 96%   Weight:  85.6 kg (188 lb 11.4 oz)     Height:           Physical examination  General Appearance:  Well developed. No distress  Mouth/Throat:  Oral mucosa moist  Neck:  Supple, no JVD  Lungs:  Breath sounds: clear  Heart::  S1,S2 heard  Abdomen:  Soft, non - tender  Musculoskeletal:  Edema -no edema noted    Medications:  Infusion:    sodium chloride       Meds:    lisinopril  10 mg Oral Daily    gabapentin  100 mg Oral TID    sodium chloride flush  5-40 mL IntraVENous 2 times per day    heparin (porcine)  5,000 Units SubCUTAneous 3 times per day    amLODIPine  10 mg Oral Daily    cyclobenzaprine  5 mg Oral BID    [Held by provider] hydroCHLOROthiazide  12.5 mg Oral Daily    levETIRAcetam  1,250 mg Oral BID    mirtazapine  15 mg Oral Nightly    pravastatin  40 mg Oral Daily    tamsulosin  0.4 mg Oral Daily    OXcarbazepine  450 mg Oral BID       Lab Data :  CBC:   Recent Labs     06/30/24  0647 07/01/24  0552   WBC 4.6*  4.8   HGB 9.7* 10.4*   HCT 29.7* 31.0*    165       CMP:  Recent Labs     06/30/24  0647 07/01/24  0552 07/02/24  0533    142 141   K 4.3 4.3 4.4    108 107   CO2 21* 24 22*   BUN 33* 21 13   CREATININE 1.8* 1.3* 1.3*   GLUCOSE 87 92 95   CALCIUM 7.7* 8.1* 8.9   MG 2.0 2.0  --        Hepatic:   No results for input(s): \"LABALBU\", \"AST\", \"ALT\", \"BILITOT\", \"ALKPHOS\" in the last 72 hours.    Invalid input(s): \"ALB\"      Assessment and Plan:  Renal -acute kidney injury etiology possibly due to prerenal/some vasomotor dysregulation he was also on hydrochlorothiazide and lisinopril at home.  Held the lisinopril and the hydrochlorothiazide.  Making good urine output creatinine getting better currently down to 1.3  Stable  Electrolytes -within normal  Mild metabolic acidosis will follow for now  History of urinary retention being planned for suprapubic catheter placement next month  Essential hypertension reasonable  Meds reviewed and discussed with patient and wife    Chris Plaza MD  [facility]    This report has been created using voice recognition software. It may contain minor errors which are inherent in voice recognition technology

## 2024-07-02 NOTE — PLAN OF CARE
Problem: Discharge Planning  Goal: Discharge to home or other facility with appropriate resources  7/2/2024 1032 by Teressa Sanchez RN  Outcome: Progressing  Flowsheets (Taken 7/2/2024 1032)  Discharge to home or other facility with appropriate resources: Identify barriers to discharge with patient and caregiver  Note: Patient preference to return to [facility] in [place].      Problem: Safety - Adult  Goal: Free from fall injury  7/2/2024 1032 by Teressa Sanchez RN  Outcome: Progressing  Flowsheets (Taken 7/2/2024 1032)  Free From Fall Injury: Instruct family/caregiver on patient safety  Note: No falls noted this shift. Continue falling star program. Bed alarm on, bed in low position. Call light and personal belongings in reach.  Patient uses call light appropriately.       Problem: Pain  Goal: Verbalizes/displays adequate comfort level or baseline comfort level  7/2/2024 1032 by Teressa Sanchez RN  Outcome: Progressing  Flowsheets (Taken 7/2/2024 1032)  Verbalizes/displays adequate comfort level or baseline comfort level:   Encourage patient to monitor pain and request assistance   Assess pain using appropriate pain scale  Note: Patient complaints of continuous left foot pain. Patient goal is 0 out of 10. Rest , reposition for non med measures.      Problem: Neurosensory - Adult  Goal: Achieves stable or improved neurological status  Outcome: Progressing  Flowsheets (Taken 7/2/2024 1032)  Achieves stable or improved neurological status: Assess for and report changes in neurological status     Problem: Gastrointestinal - Adult  Goal: Maintains or returns to baseline bowel function  Outcome: Progressing  Flowsheets (Taken 7/2/2024 1032)  Maintains or returns to baseline bowel function:   Assess bowel function   Encourage oral fluids to ensure adequate hydration     Problem: Metabolic/Fluid and Electrolytes - Adult  Goal: Electrolytes maintained within normal limits  Outcome:

## 2024-07-02 NOTE — PROGRESS NOTES
[facility]  INPATIENT PHYSICAL THERAPY  DAILY NOTE  STRZ RENAL TELEMETRY 6K - 6K-10/010-A    Time In: 0848  Time Out: 0915  Timed Code Treatment Minutes: 27 Minutes  Minutes: 27          Date: 2024  Patient Name: Ceferino Sanches Jr,  Gender:  male        MRN: [id number]  : 1945  (78 y.o.)     Referring Practitioner: Enrike Perales MD  Diagnosis: Hyperkalemia  Additional Pertinent Hx: Per EMR \"Ceferino Sanches Jr is a 78 y.o. male with past medical history described below who presents to [facility] with bilateral lower extremity numbness which caused him to fall twice this morning.  Patient's sister is at bedside and provides most of the history as patient has dementia and gets very confused when answering questions.  Patient first reports that the lower extremity numbness has been ongoing for couple months however patient sister reports that he has not complained of any back pain or leg numbness for the past month.  The assisted living noticed that he was complaining of back pain and leg numbness today and found him on the floor twice after falling.  Patient denies loss of consciousness.  He denies fevers, chills, chest pain, shortness of breath, dizziness.  He just feels his lower extremities are weak and numb and that is why he fell.  He has also been experiencing urinary retention and has had a John catheter in since his last admission.  He reports he changed his John catheter this morning at which time there was urine however ever since he changed to a new John catheter has not drained any urine.  RN exchanged the John catheter at bedside and found that it has been obstructed with some sediment and is now draining.  Patient also reports genital area numbness.  He also had an episode of diarrhea yesterday and did not make it to the bathroom in time.  Otherwise denies urinary incontinence. MRI cervical spine with no significant abnormalities, shows mild canal  Stand By Assistance  *cues for BLT for comfort  *cues for LE effort to roll ea direction    Transfers:  Sit to Stand: Contact Guard Assistance  Stand to Sit:Contact Guard Assistance    Ambulation:  Contact Guard Assistance  Distance: 15 ft + 15 ft  Surface: Level Tile  Device: Rolling Walker  Gait Deviations:  Slow Fernanda, Decreased Step Length Bilaterally, Decreased Weight Shift Bilaterally, Lean to Right, Decreased Arm Swing, Decreased Trunk Rotation, Decreased Heel Strike Bilaterally, Wide Base of Support, Moderate Path Deviations, Increased reliance on assistive device, and decrease ability to remain within RW base of support    Balance:  Static Sitting Balance:  Contact Guard Assistance  Static Standing Balance: Contact Guard Assistance    Exercise:  Patient was guided in 1 set(s) 10 reps of exercise to both lower extremities.  Ankle pumps, Hip abduction/adduction, Seated marches, and Long arc quads.  Exercises were completed for increased independence with functional mobility.    Functional Outcome Measures:  [facility] (6 CLICK) BASIC MOBILITY     AM-PAC Inpatient Mobility without Stair Climbing Raw Score : 15  AM-PAC Inpatient without Stair Climbing T-Scale Score : 43.03  Mobility Inpatient CMS 0-100% Score: 47.43  Mobility Inpatient without Stair CMS G-Code Modifier : CK     Modified McLaughlin Scale:  Not Applicable    ASSESSMENT:  Assessment: Patient progressing toward established goals.  Activity Tolerance:  Patient tolerance of  treatment: good. Patient demos good tolerance with PT today. He states that numbness and pain has improved since yesterday and only has numbness and pain in Lt foot and ankle. He has decrease strength with bed mobility, transfers and ambulation. Occasionally patient demos poor walker management and BLE are not within RW. Continued pt would benefit the patient to address deficits.  Recommend home with 24 hour assistance and Home health PT.   Equipment Recommendations:Equipment Needed:

## 2024-07-02 NOTE — CARE COORDINATION
7/2/24, 2:11 PM EDT    DISCHARGE ON GOING EVALUATION    Ceferino Sanches [facility] day: 4  Location: -10/010-A Reason for admit: Hyperkalemia [E87.5]  Heart murmur [R01.1]  Paresthesias [R20.2]  BRENNA (acute kidney injury) (HCC) [N17.9]  Closed head injury, initial encounter [S09.90XA]  Fall, initial encounter [W19.XXXA]  Weakness of both lower extremities [R29.898]  Acute renal failure, unspecified acute renal failure type (HCC) [N17.9]     Procedures: none    Imaging since last note: 7/2 left foot xray: Acute mildly displaced fractures distal second and third metatarsal shafts    Barriers to Discharge: Orthopedic consult pending.     PCP: Jihan Lyon MD  Readmission Risk Score: 22.7    Patient Goals/Plan/Treatment Preferences: From [facility], going to  [facility] for a rehab stay.

## 2024-07-02 NOTE — PLAN OF CARE
Problem: Discharge Planning  Goal: Discharge to home or other facility with appropriate resources  7/2/2024 0008 by Sabrina Wilson, RN  Outcome: Progressing  Flowsheets (Taken 7/1/2024 0845 by Reena De La Vega, RN)  Discharge to home or other facility with appropriate resources: Identify barriers to discharge with patient and caregiver  7/1/2024 1200 by Lissette Whaley LSW  Outcome: Progressing  Flowsheets (Taken 7/1/2024 0845 by Reena De La Vega, RN)  Discharge to home or other facility with appropriate resources: Identify barriers to discharge with patient and caregiver     Problem: Safety - Adult  Goal: Free from fall injury  Outcome: Progressing  Flowsheets (Taken 6/30/2024 1149 by Palak Gan, RN)  Free From Fall Injury:   Based on caregiver fall risk screen, instruct family/caregiver to ask for assistance with transferring infant if caregiver noted to have fall risk factors   Instruct family/caregiver on patient safety     Problem: Pain  Goal: Verbalizes/displays adequate comfort level or baseline comfort level  Outcome: Progressing  Flowsheets (Taken 6/30/2024 1149 by Palak Gan, RN)  Verbalizes/displays adequate comfort level or baseline comfort level:   Encourage patient to monitor pain and request assistance   Assess pain using appropriate pain scale

## 2024-07-02 NOTE — PROGRESS NOTES
PROGRESS NOTE      Patient:  Ceferino Sanches Jr  Unit/Bed:6K-10/010-A  YOB: 1945  MRN: [id number]   Acct: [id number]    PCP: Jihan Lyon MD    Date of Admission: 6/28/2024 LOS: 4    Date of Evaluation:  7/2/2024    Anticipated Discharge: likely 1-2 days     Assessment/Plan:    Lower extremity weakness with history of urinary retention  Neuro consulted and signed off.  Neurology recommends outpatient EMG with neurologist Crystal Epley at [facility]   MRI cervical and thoracic reviewed no significant abnormalities  CT head 6/28: Mild atrophy and dilatation of third and lateral ventricles however stable CT scan of the brain with no change from prior study disease 5/9/2024  CT C-spine and lumbar spine 6/28: Negative for fracture  CT thoracic spine 6/28: Diffuse osteopenia  MRI brain 6/28: normal  Cont PT/OT  Echo 7/1: EF 55 to 60%, mild AR, Mild MR  Cont home gabapentin   Plan for suprapubic catheter placement next month  Left foot pain with inability to bear weight  Left foot x-ray 7/2: Acute mildly displaced fractures distal second and third metatarsal shafts.   Consulted and contacted, will follow-up recommendations  BRENNA, improving  Cr 4.2 on admission  7/2: Cr 1.3  Baseline Cr ~1.0-1.3  Renal ultrasound 6/29: No evidence of hydronephrosis. 1.1 x 0.7 cm right renal cyst. There is a gallstone present.   Nephrology following  S/p bicarb IVF  Continue to hold hydrochlorothiazide  Continue regular diet  Hyperkalemia, resolved  Continue to replete as needed  Leukocytosis, resolved  Continue to monitor for sxs of infection  Macrocytic Anemia, stable   7/1 H/H: 10.4/31.0, stable  Ferritin 310, iron 75, TIBC 146, folate > 20, vitamin B12 495, % iron saturation 51  Continue to monitor for sxs bleed  Hyperlipidemia  Cont statin   Hypertension  Home lisinopril held in setting of BRENNA . On 7/1 discussed with Dr. Plaza for recommendations on  starting low-dose lisinopril in setting of improving BRENNA with  hypertension. Per Dr. Bola gaona to start low-dose  Will start low-dose lisinopril 10 and continue to monitor  Continue to hold home hydrochlorothiazide  Continue Norvasc  History of seizures  Continue antiepileptics  Seizure precautions            Chief Complaint: Leg numbness     Hospital Course: Per HPI, \"Ceferino Sanches Jr is a 78 y.o. male with past medical history described below who presents to [facility] with bilateral lower extremity numbness which caused him to fall twice this morning.  Patient's sister is at bedside and provides most of the history as patient has dementia and gets very confused when answering questions.  Patient first reports that the lower extremity numbness has been ongoing for couple months however patient sister reports that he has not complained of any back pain or leg numbness for the past month.  The assisted living noticed that he was complaining of back pain and leg numbness today and found him on the floor twice after falling.  Patient denies loss of consciousness.  He denies fevers, chills, chest pain, shortness of breath, dizziness.  He just feels his lower extremities are weak and numb and that is why he fell.  He has also been experiencing urinary retention and has had a John catheter in since his last admission.  He reports he changed his John catheter this morning at which time there was urine however ever since he changed to a new John catheter has not drained any urine.  RN exchanged the John catheter at bedside and found that it has been obstructed with some sediment and is now draining.  Patient also reports genital area numbness.  He also had an episode of diarrhea yesterday and did not make it to the bathroom in time.  Otherwise denies urinary incontinence.\"     Subjective/HPI:   Ceferino Sanches Jr feels well overall. He denies HA, SOB, CP, N/V/D. Pt was seen with sister at bedside. Pt is reporting left foot pain that has been worsening and he is  degenerative changes.   3. There is an old compression fracture involving the T7 with 30% compression.   There is no retropulsion into the spinal canal.   4. There is mitral valve calcification.               **This report has been created using voice recognition software. It may contain   minor errors which are inherent in voice recognition technology.**         Electronically signed by Dr. Richie Birce      CT LUMBAR SPINE WO CONTRAST   Final Result   1. Stable CT scan of the lumbar spine, no interval change since previous MRI   scan dated 8/22/2022.   2. No acute fracture noted.            **This report has been created using voice recognition software. It may contain   minor errors which are inherent in voice recognition technology.**            Electronically signed by Dr. Richie Brice      US RENAL COMPLETE   Final Result      1. No evidence of hydronephrosis.   2. 1.1 x 0.7 cm right renal cyst.   3. There is a catheter within the bladder.   4. There is a gallstone present.               **This report has been created using voice recognition software. It may contain   minor errors which are inherent in voice recognition technology.**            Electronically signed by Dr. Richie Brice          Diet: ADULT DIET; Regular; Low Potassium (Less than 3000 mg/day)    Microbiology: yes - reviewed  Antibiotics: no    Steroids: no    LDA: []CVC / []PICC / []Midline / []John / []Drains / []Mediport / [x]PIV / []None    Labs (still needed?): [x]Yes / []No  IVF (still needed?): []Yes / [x]No    Level of care: []Step Down / [x]Med-Surg  Bed Status: [x]Inpatient / []Observation    DVT Prophylaxis: [] Lovenox / [x] Heparin / [] SCDs / [] Already on Systemic Anticoagulation / [] None     PT/OT: [x]Yes / []No    Disposition:    [] Home       [] TCU       [] Rehab       [] Psych       [x] SNF       [] Long Term Care Facility       [] Other-    Code Status: Full Code      An electronic signature was used to authenticate this

## 2024-07-03 VITALS
SYSTOLIC BLOOD PRESSURE: 135 MMHG | BODY MASS INDEX: 28.02 KG/M2 | HEIGHT: 69 IN | TEMPERATURE: 97.6 F | HEART RATE: 89 BPM | DIASTOLIC BLOOD PRESSURE: 75 MMHG | RESPIRATION RATE: 18 BRPM | WEIGHT: 189.15 LBS | OXYGEN SATURATION: 100 %

## 2024-07-03 LAB
ANION GAP SERPL CALC-SCNC: 12 MEQ/L (ref 8–16)
BUN SERPL-MCNC: 14 MG/DL (ref 7–22)
CALCIUM SERPL-MCNC: 9 MG/DL (ref 8.5–10.5)
CHLORIDE SERPL-SCNC: 106 MEQ/L (ref 98–111)
CO2 SERPL-SCNC: 19 MEQ/L (ref 23–33)
CREAT SERPL-MCNC: 1.1 MG/DL (ref 0.4–1.2)
GFR SERPL CREATININE-BSD FRML MDRD: 68 ML/MIN/1.73M2
GLUCOSE SERPL-MCNC: 100 MG/DL (ref 70–108)
POTASSIUM SERPL-SCNC: 4.5 MEQ/L (ref 3.5–5.2)
SODIUM SERPL-SCNC: 137 MEQ/L (ref 135–145)

## 2024-07-03 PROCEDURE — 6360000002 HC RX W HCPCS: Performed by: STUDENT IN AN ORGANIZED HEALTH CARE EDUCATION/TRAINING PROGRAM

## 2024-07-03 PROCEDURE — 99232 SBSQ HOSP IP/OBS MODERATE 35: CPT | Performed by: INTERNAL MEDICINE

## 2024-07-03 PROCEDURE — 6370000000 HC RX 637 (ALT 250 FOR IP)

## 2024-07-03 PROCEDURE — 97530 THERAPEUTIC ACTIVITIES: CPT

## 2024-07-03 PROCEDURE — 80048 BASIC METABOLIC PNL TOTAL CA: CPT

## 2024-07-03 PROCEDURE — 97116 GAIT TRAINING THERAPY: CPT

## 2024-07-03 PROCEDURE — 2580000003 HC RX 258: Performed by: STUDENT IN AN ORGANIZED HEALTH CARE EDUCATION/TRAINING PROGRAM

## 2024-07-03 PROCEDURE — 36415 COLL VENOUS BLD VENIPUNCTURE: CPT

## 2024-07-03 PROCEDURE — 99239 HOSP IP/OBS DSCHRG MGMT >30: CPT | Performed by: FAMILY MEDICINE

## 2024-07-03 PROCEDURE — 6370000000 HC RX 637 (ALT 250 FOR IP): Performed by: STUDENT IN AN ORGANIZED HEALTH CARE EDUCATION/TRAINING PROGRAM

## 2024-07-03 PROCEDURE — 97535 SELF CARE MNGMENT TRAINING: CPT

## 2024-07-03 PROCEDURE — 6370000000 HC RX 637 (ALT 250 FOR IP): Performed by: NURSE PRACTITIONER

## 2024-07-03 PROCEDURE — 97110 THERAPEUTIC EXERCISES: CPT

## 2024-07-03 RX ORDER — AMLODIPINE BESYLATE 10 MG/1
10 TABLET ORAL DAILY
Qty: 30 TABLET | Refills: 3 | DISCHARGE
Start: 2024-07-04

## 2024-07-03 RX ORDER — LISINOPRIL 10 MG/1
10 TABLET ORAL DAILY
Qty: 30 TABLET | Refills: 3 | DISCHARGE
Start: 2024-07-04

## 2024-07-03 RX ORDER — HYDROCODONE BITARTRATE AND ACETAMINOPHEN 5; 325 MG/1; MG/1
1 TABLET ORAL EVERY 8 HOURS PRN
Qty: 3 TABLET | Refills: 0 | Status: SHIPPED | OUTPATIENT
Start: 2024-07-03 | End: 2024-07-06

## 2024-07-03 RX ADMIN — GABAPENTIN 100 MG: 100 CAPSULE ORAL at 07:45

## 2024-07-03 RX ADMIN — HEPARIN SODIUM 5000 UNITS: 5000 INJECTION INTRAVENOUS; SUBCUTANEOUS at 00:00

## 2024-07-03 RX ADMIN — CYCLOBENZAPRINE 5 MG: 10 TABLET, FILM COATED ORAL at 07:44

## 2024-07-03 RX ADMIN — SODIUM CHLORIDE, PRESERVATIVE FREE 10 ML: 5 INJECTION INTRAVENOUS at 07:46

## 2024-07-03 RX ADMIN — OXCARBAZEPINE 450 MG: 300 TABLET, FILM COATED ORAL at 07:45

## 2024-07-03 RX ADMIN — HEPARIN SODIUM 5000 UNITS: 5000 INJECTION INTRAVENOUS; SUBCUTANEOUS at 07:45

## 2024-07-03 RX ADMIN — LISINOPRIL 10 MG: 10 TABLET ORAL at 07:46

## 2024-07-03 RX ADMIN — GABAPENTIN 100 MG: 100 CAPSULE ORAL at 14:26

## 2024-07-03 RX ADMIN — TAMSULOSIN HYDROCHLORIDE 0.4 MG: 0.4 CAPSULE ORAL at 07:45

## 2024-07-03 RX ADMIN — HYDROCODONE BITARTRATE AND ACETAMINOPHEN 1 TABLET: 5; 325 TABLET ORAL at 09:27

## 2024-07-03 RX ADMIN — PRAVASTATIN SODIUM 40 MG: 40 TABLET ORAL at 07:46

## 2024-07-03 RX ADMIN — AMLODIPINE BESYLATE 10 MG: 10 TABLET ORAL at 07:45

## 2024-07-03 RX ADMIN — LEVETIRACETAM 1250 MG: 500 TABLET, FILM COATED ORAL at 07:45

## 2024-07-03 ASSESSMENT — PAIN SCALES - GENERAL
PAINLEVEL_OUTOF10: 4
PAINLEVEL_OUTOF10: 4
PAINLEVEL_OUTOF10: 5

## 2024-07-03 ASSESSMENT — PAIN DESCRIPTION - ORIENTATION
ORIENTATION: LEFT

## 2024-07-03 ASSESSMENT — PAIN DESCRIPTION - LOCATION
LOCATION: FOOT
LOCATION: FOOT

## 2024-07-03 NOTE — PROGRESS NOTES
[facility]  INPATIENT PHYSICAL THERAPY  DAILY NOTE  STRZ RENAL TELEMETRY 6K - [id number]-10010-A      Time In: 1317  Time Out: 1356  Timed Code Treatment Minutes: 39 Minutes  Minutes: 39          Date: 7/3/2024  Patient Name: Ceferino Sanches Jr,  Gender:  male        MRN: [id number]  : 1945  (78 y.o.)     Referring Practitioner: Enrike Perales MD  Diagnosis: Hyperkalemia  Additional Pertinent Hx: Per EMR \"Ceferino Sanches Jr is a 78 y.o. male with past medical history described below who presents to [facility] with bilateral lower extremity numbness which caused him to fall twice this morning.  Patient's sister is at bedside and provides most of the history as patient has dementia and gets very confused when answering questions.  Patient first reports that the lower extremity numbness has been ongoing for couple months however patient sister reports that he has not complained of any back pain or leg numbness for the past month.  The assisted living noticed that he was complaining of back pain and leg numbness today and found him on the floor twice after falling.  Patient denies loss of consciousness.  He denies fevers, chills, chest pain, shortness of breath, dizziness.  He just feels his lower extremities are weak and numb and that is why he fell.  He has also been experiencing urinary retention and has had a John catheter in since his last admission.  He reports he changed his John catheter this morning at which time there was urine however ever since he changed to a new John catheter has not drained any urine.  RN exchanged the John catheter at bedside and found that it has been obstructed with some sediment and is now draining.  Patient also reports genital area numbness.  He also had an episode of diarrhea yesterday and did not make it to the bathroom in time.  Otherwise denies urinary incontinence. MRI cervical spine with no significant abnormalities, shows mild canal  stenosis and mild to moderate bilateral foraminal stenosis.  MRI thoracic spine shows mild old compression deformity involving the T7 vertebral body with 30% compression and thoracic spondylolysis resulting in mild canal stenosis with no cord compression at T6-7 and T7-8.Planning Outpatient EMG/nerve conduction study with neurologist Crystal Epley at [facility]\"-- 7/2 xray to left foot showed Acute mildly displaced fractures distal second and third metatarsal  shafts     Prior Level of Function:  Lives With: Alone  Type of Home: Assisted living  Home Layout: One level  Home Access: Level entry, Elevator (3rd floor apartment and access with elevator)  Home Equipment: Rollator, Adjustable bed, Cane   Bathroom Shower/Tub: Shower chair without back  Bathroom Toilet: Handicap height  Bathroom Equipment: Grab bars in shower, Built-in shower seat  Bathroom Accessibility: Accessible    ADL Assistance: Needs assistance  Homemaking Assistance: Needs assistance  Homemaking Responsibilities: No  Ambulation Assistance: Independent  Transfer Assistance: Independent  Active : No  Additional Comments: Sister lives close to assissted living and can help when needed. Pt did not use AD prior due to feeling unsafe with rollator.  Pt goes to/from the dining room and takes the elvator for each meal.    Restrictions/Precautions:  Restrictions/Precautions: General Precautions, Fall Risk, Weight Bearing  Left Lower Extremity Weight Bearing: Weight Bearing As Tolerated (with surgical shoe)  Position Activity Restriction  Spinal Precautions: No Bending, No Lifting, No Twisting  Other position/activity restrictions: 7/2- x ray left foot Acute mildly displaced fractures distal second and third metatarsal  shafts- ok for WBAT w/ surgical shoe       SUBJECTIVE: pt agreeable for therapy and eager to complete mobility he had a lot of questions about his recent foot xray- pts sister present and supportive and reported that she will be transporting

## 2024-07-03 NOTE — PROGRESS NOTES
[facility]   Nephrology progress note  7/3/2024, 12:36 PM      Pt Name:    Ceferino Sanches Jr  MRN:     [id number]     YOB: 1945  Admit Date:    6/28/2024 12:22 PM    Chief Complaint: Nephrology following for acute kidney injury and hyperkalemia.    Subjective:  Patient seen and examined  No chest pain or shortness of breath  Making good urine. No issues    Objective:  24HR INTAKE/OUTPUT:    Intake/Output Summary (Last 24 hours) at 7/3/2024 1236  Last data filed at 7/3/2024 0818  Gross per 24 hour   Intake 440 ml   Output 1150 ml   Net -710 ml        Admission weight: 86.6 kg (191 lb)  Wt Readings from Last 3 Encounters:   07/03/24 85.8 kg (189 lb 2.5 oz)   05/09/24 88.5 kg (195 lb)   04/04/24 88 kg (194 lb)        Vitals :   Vitals:    07/03/24 0452 07/03/24 0600 07/03/24 0743 07/03/24 1151   BP: (!) 141/80  (!) 154/83 135/75   Pulse: 85  88 89   Resp:   18 18   Temp: 98.1 °F (36.7 °C)  97.7 °F (36.5 °C) 97.6 °F (36.4 °C)   TempSrc: Oral  Oral Oral   SpO2: 96%  98% 100%   Weight:  85.8 kg (189 lb 2.5 oz)     Height:           Physical examination  General Appearance:  Well developed. No distress  Mouth/Throat:  Oral mucosa moist  Neck:  Supple, no JVD  Lungs:  Breath sounds: clear  Heart::  S1,S2 heard  Abdomen:  Soft, non - tender  Musculoskeletal:  Edema -no edema noted    Medications:  Infusion:    sodium chloride       Meds:    lisinopril  10 mg Oral Daily    gabapentin  100 mg Oral TID    sodium chloride flush  5-40 mL IntraVENous 2 times per day    heparin (porcine)  5,000 Units SubCUTAneous 3 times per day    amLODIPine  10 mg Oral Daily    cyclobenzaprine  5 mg Oral BID    [Held by provider] hydroCHLOROthiazide  12.5 mg Oral Daily    levETIRAcetam  1,250 mg Oral BID    mirtazapine  15 mg Oral Nightly    pravastatin  40 mg Oral Daily    tamsulosin  0.4 mg Oral Daily    OXcarbazepine  450 mg Oral BID       Lab Data :  CBC:   Recent Labs     07/01/24  0552   WBC 4.8   HGB  10.4*   HCT 31.0*          CMP:  Recent Labs     07/01/24  0552 07/02/24  0533 07/03/24  0553    141 137   K 4.3 4.4 4.5    107 106   CO2 24 22* 19*   BUN 21 13 14   CREATININE 1.3* 1.3* 1.1   GLUCOSE 92 95 100   CALCIUM 8.1* 8.9 9.0   MG 2.0  --   --        Hepatic:   No results for input(s): \"LABALBU\", \"AST\", \"ALT\", \"BILITOT\", \"ALKPHOS\" in the last 72 hours.    Invalid input(s): \"ALB\"      Assessment and Plan:  Renal -acute kidney injury etiology possibly due to prerenal/some vasomotor dysregulation he was also on hydrochlorothiazide and lisinopril at home.  Held the lisinopril and the hydrochlorothiazide.  Making good urine output creatinine getting better currently down to 1.1  Electrolytes -within normal  Mild metabolic acidosis will follow for now  History of urinary retention being planned for suprapubic catheter placement next month  Essential hypertension reasonable  Meds reviewed and discussed with patient     Chris Plaza MD  [facility]    This report has been created using voice recognition software. It may contain minor errors which are inherent in voice recognition technology

## 2024-07-03 NOTE — PROGRESS NOTES
Patient escorted to front lobby via transportation where sister was waiting to transportation patient to facility.

## 2024-07-03 NOTE — DISCHARGE SUMMARY
DISCHARGE SUMMARY      Patient Identification:   Ceferino Sanches Jr   : 1945  MRN: [id number]   Account: [id number]      Patient's PCP: Jihan Lyon MD    Admit Date: 2024, 1222     Discharge Date: 7/3/24     Admitting Physician: Richard Menendez MD     Discharge Physician: Jihan Thompson MD     Discharge Diagnoses:    Lower extremity weakness with history of urinary retention  Left foot pain with inability to bear weight  BRENNA, resolved  Hyperkalemia, resolved  Leukocytosis, resolved  Macrocytic Anemia, stable   Hyperlipidemia  Hypertension  History of seizures    The patient was seen and examined on day of discharge and this discharge summary is in conjunction with any daily progress note from day of discharge.    Hospital Course:   Ceferino Sanches Jr is a 78 y.o. male with  has a past medical history of Anxiety disorder, Cancer (HCC), COPD (chronic obstructive pulmonary disease) ([facility]), HLD (hyperlipidemia), and Seizure disorder ([facility]) who was admitted to [facility] on 2024 for leg numbness.       Hospital Course By Problem:     Lower extremity weakness with history of urinary retention  Neuro consulted and signed off.  Neurology recommends outpatient EMG with neurologist Crystal Epley at [facility]   MRI cervical and thoracic reviewed no significant abnormalities  CT head : Mild atrophy and dilatation of third and lateral ventricles however stable CT scan of the brain with no change from prior study disease 2024  CT C-spine and lumbar spine : Negative for fracture  CT thoracic spine : Diffuse osteopenia  MRI brain : normal  PT/OT  Echo : EF 55 to 60%, mild AR, Mild MR  Cont home gabapentin   Plan for suprapubic catheter placement next month  Left foot pain with inability to bear weight  Left foot x-ray : Acute mildly displaced fractures distal second and third metatarsal shafts.   Ortho consulted and placed in boot with recommendations to follow up in

## 2024-07-03 NOTE — PLAN OF CARE
Problem: Discharge Planning  Goal: Discharge to home or other facility with appropriate resources  7/2/2024 2337 by Sabrina Wilson RN  Outcome: Progressing  Flowsheets (Taken 7/2/2024 2053)  Discharge to home or other facility with appropriate resources: Identify barriers to discharge with patient and caregiver  7/2/2024 1032 by Teressa Sanchez RN  Outcome: Progressing  Flowsheets (Taken 7/2/2024 1032)  Discharge to home or other facility with appropriate resources: Identify barriers to discharge with patient and caregiver  Note: Patient preference to return to [facility] in [place].      Problem: Safety - Adult  Goal: Free from fall injury  7/2/2024 2337 by Sabrina Wilson RN  Outcome: Progressing  Flowsheets (Taken 7/2/2024 1032 by Teressa Sanchez RN)  Free From Fall Injury: Instruct family/caregiver on patient safety  7/2/2024 1032 by Teressa Sanchez RN  Outcome: Progressing  Flowsheets (Taken 7/2/2024 1032)  Free From Fall Injury: Instruct family/caregiver on patient safety  Note: No falls noted this shift. Continue falling star program. Bed alarm on, bed in low position. Call light and personal belongings in reach.  Patient uses call light appropriately.       Problem: Pain  Goal: Verbalizes/displays adequate comfort level or baseline comfort level  7/2/2024 2337 by Sabrina Wilson RN  Outcome: Progressing  Flowsheets (Taken 7/2/2024 1032 by Teressa Sanchez RN)  Verbalizes/displays adequate comfort level or baseline comfort level:   Encourage patient to monitor pain and request assistance   Assess pain using appropriate pain scale  7/2/2024 1032 by Teressa Sanchez RN  Outcome: Progressing  Flowsheets (Taken 7/2/2024 1032)  Verbalizes/displays adequate comfort level or baseline comfort level:   Encourage patient to monitor pain and request assistance   Assess pain using appropriate pain scale  Note: Patient complaints of continuous left foot pain. Patient goal is 0 out of  10. Rest , reposition for non med measures.      Problem: Neurosensory - Adult  Goal: Achieves stable or improved neurological status  7/2/2024 2337 by Sabrina Wilson RN  Outcome: Progressing  Flowsheets (Taken 7/2/2024 2053)  Achieves stable or improved neurological status: Assess for and report changes in neurological status  7/2/2024 1032 by Teressa Sanchez RN  Outcome: Progressing  Flowsheets (Taken 7/2/2024 1032)  Achieves stable or improved neurological status: Assess for and report changes in neurological status     Problem: Respiratory - Adult  Goal: Achieves optimal ventilation and oxygenation  7/2/2024 2337 by Sabrina Wilson RN  Outcome: Progressing  Flowsheets (Taken 7/2/2024 2337)  Achieves optimal ventilation and oxygenation: Assess for changes in respiratory status  7/2/2024 1032 by Teressa Sanchez RN  Outcome: Progressing     Problem: Gastrointestinal - Adult  Goal: Maintains or returns to baseline bowel function  7/2/2024 2337 by Sabrina Wilson RN  Outcome: Progressing  Flowsheets (Taken 7/2/2024 2053)  Maintains or returns to baseline bowel function:   Assess bowel function   Encourage oral fluids to ensure adequate hydration  7/2/2024 1032 by Teressa Sanchez RN  Outcome: Progressing  Flowsheets (Taken 7/2/2024 1032)  Maintains or returns to baseline bowel function:   Assess bowel function   Encourage oral fluids to ensure adequate hydration  Goal: Maintains adequate nutritional intake  7/2/2024 2337 by Sabrina Wilson RN  Outcome: Progressing  Flowsheets (Taken 7/2/2024 2337)  Maintains adequate nutritional intake: Monitor percentage of each meal consumed  7/2/2024 1032 by Teressa Sanchez RN  Outcome: Progressing     Problem: Genitourinary - Adult  Goal: Urinary catheter remains patent  7/2/2024 2337 by Sabrina Wilson RN  Outcome: Progressing  Flowsheets (Taken 7/2/2024 2337)  Urinary catheter remains patent: Assess patency of urinary catheter  7/2/2024 1032 by

## 2024-07-03 NOTE — PROGRESS NOTES
[facility]  INPATIENT OCCUPATIONAL THERAPY  STRZ RENAL TELEMETRY 6K  EVALUATION    Time:   Time In:   Time Out:   Timed Code Treatment Minutes: 23 Minutes  Minutes: 38          Date: 2024  Patient Name: Ceferino Sanches Jr,   Gender: male      MRN: [id number]  : 1945  (78 y.o.)  Referring Practitioner: Dr. SUMA Perales MD  Diagnosis: Hyperkalemia  Additional Pertinent Hx: Pt presents to [facility] with bilateral lower extremity numbness which caused him to fall twice this morning.  Patient's sister is at bedside and provides most of the history as patient has dementia and gets very confused when answering questions.  Patient first reports that the lower extremity numbness has been ongoing for couple months however patient sister reports that he has not complained of any back pain or leg numbness for the past month.  The assisted living noticed that he was complaining of back pain and leg numbness today and found him on the floor twice after falling.  Patient denies loss of consciousness.  He denies fevers, chills, chest pain, shortness of breath, dizziness.  He just feels his lower extremities are weak and numb and that is why he fell.  He has also been experiencing urinary retention and has had a John catheter in since his last admission.  He reports he changed his John catheter this morning at which time there was urine however ever since he changed to a new John catheter has not drained any urine.  RN exchanged the John catheter at bedside and found that it has been obstructed with some sediment and is now draining.  Patient also reports genital area numbness.  He also had an episode of diarrhea yesterday and did not make it to the bathroom in time.  Otherwise denies urinary incontinence. MRI cervical spine with no significant abnormalities, shows mild canal stenosis and mild to moderate bilateral foraminal stenosis.  MRI thoracic spine shows mild old  was being controlled in this position.      Modified Estefani Scale:  Not Applicable    Assessment:  Assessment: Pt presents to occupational therapy on Acute with hyperkalemia. Pt demonstrates decreased standing balance and pain in his L foot impacting patient's ability to complete self care at prior level of functioning. Pt currently requires increased assistance with self care and use of a rolling walker for mobility.  Due to patient's performance deficits in endurance and standing balance, he would greatly benefit from continued occupational therapy services.    Performance deficits / Impairments: Decreased functional mobility , Decreased ADL status, Decreased strength, Decreased balance, Decreased endurance  Prognosis: Good  REQUIRES OT FOLLOW-UP: Yes  Decision Making: Medium Complexity    Treatment Initiated: Treatment and education initiated within context of evaluation.  Evaluation time included review of current medical information, gathering information related to past medical, social and functional history, completion of standardized testing, formal and informal observation of tasks, assessment of data and development of plan of care and goals.  Treatment time included skilled education and facilitation of tasks to increase safety and independence with ADL's for improved functional independence and quality of life.  Pt would benefit from continued skilled OT services when medically stable and discharged from Acute.   Pt was doing his own self care. He took showers and used a shower seat in the tub/shower and transferred himself into and out of the tub while standing. He was not using any AD for ambulation. He had recently obtained a 4 wheeled walker but had not started to use it secondary to not not feeling safe.  Pt asked about the treatments that are possible for his foot.  Pt was educated that the decision about his care will be by the orthopedic practitioner.  His weight bearing status will be important

## 2024-07-03 NOTE — PLAN OF CARE
Problem: Discharge Planning  Goal: Discharge to home or other facility with appropriate resources  7/3/2024 1020 by Nidhi Dunbar, RN  Outcome: Progressing  7/2/2024 2337 by Sabrina Wilson, RN  Outcome: Progressing  Flowsheets (Taken 7/2/2024 2053)  Discharge to home or other facility with appropriate resources: Identify barriers to discharge with patient and caregiver

## 2024-07-03 NOTE — CARE COORDINATION
7/3/24, 3:01 PM EDT    Patient goals/plan/ treatment preferences discussed by  and .  Patient goals/plan/ treatment preferences reviewed with patient/ family.  Patient/ family verbalize understanding of discharge plan and are in agreement with goal/plan/treatment preferences.  Understanding was demonstrated using the teach back method.  AVS provided by RN at time of discharge, which includes all necessary medical information pertaining to the patients current course of illness, treatment, post-discharge goals of care, and treatment preferences.     Services At/After Discharge: Skilled Nursing Facility (SNF)    Patient discharge today to [facility] and Sister present here to transport. Patient is agreement and AVS and report to be called by RN.

## 2024-07-03 NOTE — PROGRESS NOTES
[facility]  STRZ RENAL TELEMETRY 6K  Occupational Therapy  Daily Note  Time:   Time In: 1155  Time Out: 1234  Timed Code Treatment Minutes: 39 Minutes  Minutes: 39          Date: 7/3/2024  Patient Name: Ceferino Sanches Jr,   Gender: male      [facility]  MRN: [id number]  : 1945  (78 y.o.)  Referring Practitioner: Dr. SUMA Perales MD  Diagnosis: Hyperkalemia  Additional Pertinent Hx: Pt presents to [facility] with bilateral lower extremity numbness which caused him to fall twice this morning.  Patient's sister is at bedside and provides most of the history as patient has dementia and gets very confused when answering questions.  Patient first reports that the lower extremity numbness has been ongoing for couple months however patient sister reports that he has not complained of any back pain or leg numbness for the past month.  The assisted living noticed that he was complaining of back pain and leg numbness today and found him on the floor twice after falling.  Patient denies loss of consciousness.  He denies fevers, chills, chest pain, shortness of breath, dizziness.  He just feels his lower extremities are weak and numb and that is why he fell.  He has also been experiencing urinary retention and has had a John catheter in since his last admission.  He reports he changed his John catheter this morning at which time there was urine however ever since he changed to a new John catheter has not drained any urine.  RN exchanged the John catheter at bedside and found that it has been obstructed with some sediment and is now draining.  Patient also reports genital area numbness.  He also had an episode of diarrhea yesterday and did not make it to the bathroom in time.  Otherwise denies urinary incontinence. MRI cervical spine with no significant abnormalities, shows mild canal stenosis and mild to moderate bilateral foraminal stenosis.  MRI thoracic spine shows mild old

## 2024-07-15 PROBLEM — D64.9 CHRONIC ANEMIA: Status: ACTIVE | Noted: 2024-07-15

## 2024-07-15 PROBLEM — Z97.8 CHRONIC INDWELLING FOLEY CATHETER: Status: ACTIVE | Noted: 2024-07-15

## 2024-07-15 PROBLEM — R29.6 FALLS FREQUENTLY: Status: ACTIVE | Noted: 2024-07-15

## 2024-07-15 PROBLEM — B37.49 CANDIDURIA: Status: ACTIVE | Noted: 2024-07-15

## 2024-07-15 PROBLEM — G47.00 INSOMNIA: Status: ACTIVE | Noted: 2024-07-15

## 2024-07-15 PROBLEM — Z87.898 HISTORY OF SEIZURES: Status: ACTIVE | Noted: 2024-07-15

## 2024-07-15 PROBLEM — R53.81 PHYSICAL DECONDITIONING: Status: ACTIVE | Noted: 2024-07-15

## 2024-07-15 PROBLEM — D72.829 LEUKOCYTOSIS: Status: ACTIVE | Noted: 2024-07-15

## 2024-07-15 PROBLEM — S92.302A MULTIPLE CLOSED FRACTURES OF METATARSAL BONE OF LEFT FOOT: Status: ACTIVE | Noted: 2024-07-15

## 2024-07-15 PROBLEM — R26.81 UNSTEADY GAIT: Status: ACTIVE | Noted: 2024-07-15

## 2024-07-15 PROBLEM — F03.90 DEMENTIA WITHOUT BEHAVIORAL DISTURBANCE (HCC): Status: ACTIVE | Noted: 2024-07-15

## 2024-07-16 NOTE — PROGRESS NOTES
Physician Progress Note      PATIENT:               FILI SAINI JR  [facility] #:                  [id number]  :                       1945  ADMIT DATE:       2024 12:22 PM  DISCH DATE:        7/3/2024 4:28 PM  RESPONDING  PROVIDER #:        Ramiro Dubois DO          QUERY TEXT:    Pt admitted with LE numbness and weakness. If possible, please document in the   progress notes and discharge summary if you are evaluating and / or treating   any of the following:    The medical record reflects the following:  Risk Factors: BRENNA, bilateral foraminal stenosis  Clinical Indicators: IM PN -Patient admitted for lower extremity numbness,   noted to have BRENNA on presentation, peak creatinine of 4.2. Etiology felt to be   prerenal/medication associated, MRI lumbar spine shows old L1 inferior   endplate vertebral body compression fracture, CT Lumbar -At L1-L2, there   is mild canal and mild to moderate bilateral foraminal stenosis. At L3-4,   there is mild canal and mild to moderate bilateral foraminal stenosis. At   L4-5, there is mild to moderate bilateral foraminal stenosis but no canal   stenosis.  Treatment: MRI LUMBAR SPINE, Cont PT/OT      Thank you,  Caroline Jorgensen S CDS  Options provided:  -- LE numbness and weakness due to lumbar stenosis  -- LE numbness and weakness due to lumbar compression fracture  -- LE numbness and weakness due to other cause, Please document other cause.  -- Other - I will add my own diagnosis  -- Disagree - Not applicable / Not valid  -- Disagree - Clinically unable to determine / Unknown  -- Refer to Clinical Documentation Reviewer    PROVIDER RESPONSE TEXT:    LE numbness and weakness due to lumbar compression fracture    Query created by: Caroline Jorgensen on 2024 1:32 AM      Electronically signed by:  Ramiro Dubois DO 2024 10:59 AM

## 2024-07-22 ENCOUNTER — HOSPITAL ENCOUNTER (OUTPATIENT)
Age: 79
Setting detail: SPECIMEN
Discharge: HOME | End: 2024-07-22
Payer: MEDICARE

## 2024-07-22 PROCEDURE — 87077 CULTURE AEROBIC IDENTIFY: CPT

## 2024-07-22 PROCEDURE — 87086 URINE CULTURE/COLONY COUNT: CPT

## 2024-07-22 PROCEDURE — 87186 SC STD MICRODIL/AGAR DIL: CPT

## 2024-07-25 LAB
BACTERIA UR CULT: ABNORMAL
BACTERIA UR CULT: ABNORMAL
ORGANISM: ABNORMAL

## 2024-08-06 ENCOUNTER — APPOINTMENT (OUTPATIENT)
Dept: CT IMAGING | Age: 79
DRG: 919 | End: 2024-08-06
Payer: MEDICARE

## 2024-08-06 ENCOUNTER — HOSPITAL ENCOUNTER (INPATIENT)
Age: 79
LOS: 5 days | Discharge: NURSING, REHAB OR LONG-TERM CARE | DRG: 919 | End: 2024-08-13
Attending: EMERGENCY MEDICINE | Admitting: INTERNAL MEDICINE
Payer: MEDICARE

## 2024-08-06 DIAGNOSIS — F03.90 DEMENTIA WITHOUT BEHAVIORAL DISTURBANCE (HCC): ICD-10-CM

## 2024-08-06 DIAGNOSIS — G89.18 POST-OP PAIN: ICD-10-CM

## 2024-08-06 DIAGNOSIS — G56.03 CARPAL TUNNEL SYNDROME, BILATERAL: ICD-10-CM

## 2024-08-06 DIAGNOSIS — G40.909 SEIZURE DISORDER (HCC): ICD-10-CM

## 2024-08-06 DIAGNOSIS — R58 BLEEDING: Primary | ICD-10-CM

## 2024-08-06 PROBLEM — R56.9 SEIZURE-LIKE ACTIVITY (HCC): Status: ACTIVE | Noted: 2024-08-06

## 2024-08-06 LAB
ANION GAP SERPL CALC-SCNC: 13 MEQ/L (ref 8–16)
BASOPHILS ABSOLUTE: 0 THOU/MM3 (ref 0–0.1)
BASOPHILS NFR BLD AUTO: 0.2 %
BUN SERPL-MCNC: 20 MG/DL (ref 7–22)
CALCIUM SERPL-MCNC: 8.9 MG/DL (ref 8.5–10.5)
CHLORIDE SERPL-SCNC: 105 MEQ/L (ref 98–111)
CO2 SERPL-SCNC: 21 MEQ/L (ref 23–33)
CREAT SERPL-MCNC: 1.2 MG/DL (ref 0.4–1.2)
DEPRECATED RDW RBC AUTO: 47.4 FL (ref 35–45)
EOSINOPHIL NFR BLD AUTO: 0.1 %
EOSINOPHILS ABSOLUTE: 0 THOU/MM3 (ref 0–0.4)
ERYTHROCYTE [DISTWIDTH] IN BLOOD BY AUTOMATED COUNT: 13.2 % (ref 11.5–14.5)
GFR SERPL CREATININE-BSD FRML MDRD: 62 ML/MIN/1.73M2
GLUCOSE SERPL-MCNC: 121 MG/DL (ref 70–108)
HCT VFR BLD AUTO: 35.6 % (ref 42–52)
HGB BLD-MCNC: 12 GM/DL (ref 14–18)
IMM GRANULOCYTES # BLD AUTO: 0.05 THOU/MM3 (ref 0–0.07)
IMM GRANULOCYTES NFR BLD AUTO: 0.5 %
LYMPHOCYTES ABSOLUTE: 0.7 THOU/MM3 (ref 1–4.8)
LYMPHOCYTES NFR BLD AUTO: 7.8 %
MCH RBC QN AUTO: 32.9 PG (ref 26–33)
MCHC RBC AUTO-ENTMCNC: 33.7 GM/DL (ref 32.2–35.5)
MCV RBC AUTO: 97.5 FL (ref 80–94)
MONOCYTES ABSOLUTE: 0.6 THOU/MM3 (ref 0.4–1.3)
MONOCYTES NFR BLD AUTO: 6.8 %
NEUTROPHILS ABSOLUTE: 8 THOU/MM3 (ref 1.8–7.7)
NEUTROPHILS NFR BLD AUTO: 84.6 %
NRBC BLD AUTO-RTO: 0 /100 WBC
OSMOLALITY SERPL CALC.SUM OF ELEC: 281.4 MOSMOL/KG (ref 275–300)
PLATELET # BLD AUTO: 216 THOU/MM3 (ref 130–400)
PMV BLD AUTO: 10.1 FL (ref 9.4–12.4)
POTASSIUM SERPL-SCNC: 5.3 MEQ/L (ref 3.5–5.2)
RBC # BLD AUTO: 3.65 MILL/MM3 (ref 4.7–6.1)
SODIUM SERPL-SCNC: 139 MEQ/L (ref 135–145)
WBC # BLD AUTO: 9.5 THOU/MM3 (ref 4.8–10.8)

## 2024-08-06 PROCEDURE — 80048 BASIC METABOLIC PNL TOTAL CA: CPT

## 2024-08-06 PROCEDURE — 6360000004 HC RX CONTRAST MEDICATION: Performed by: EMERGENCY MEDICINE

## 2024-08-06 PROCEDURE — 36415 COLL VENOUS BLD VENIPUNCTURE: CPT

## 2024-08-06 PROCEDURE — 85025 COMPLETE CBC W/AUTO DIFF WBC: CPT

## 2024-08-06 PROCEDURE — G0378 HOSPITAL OBSERVATION PER HR: HCPCS

## 2024-08-06 PROCEDURE — 6360000002 HC RX W HCPCS: Performed by: EMERGENCY MEDICINE

## 2024-08-06 PROCEDURE — 80177 DRUG SCRN QUAN LEVETIRACETAM: CPT

## 2024-08-06 PROCEDURE — 84146 ASSAY OF PROLACTIN: CPT

## 2024-08-06 PROCEDURE — 99222 1ST HOSP IP/OBS MODERATE 55: CPT

## 2024-08-06 PROCEDURE — 2580000003 HC RX 258: Performed by: EMERGENCY MEDICINE

## 2024-08-06 PROCEDURE — 74177 CT ABD & PELVIS W/CONTRAST: CPT

## 2024-08-06 PROCEDURE — 99285 EMERGENCY DEPT VISIT HI MDM: CPT

## 2024-08-06 PROCEDURE — 96365 THER/PROPH/DIAG IV INF INIT: CPT

## 2024-08-06 RX ORDER — ENOXAPARIN SODIUM 100 MG/ML
40 INJECTION SUBCUTANEOUS DAILY
Status: DISCONTINUED | OUTPATIENT
Start: 2024-08-07 | End: 2024-08-13 | Stop reason: HOSPADM

## 2024-08-06 RX ORDER — LISINOPRIL 10 MG/1
10 TABLET ORAL DAILY
Status: DISCONTINUED | OUTPATIENT
Start: 2024-08-07 | End: 2024-08-13 | Stop reason: HOSPADM

## 2024-08-06 RX ORDER — ONDANSETRON 4 MG/1
4 TABLET, ORALLY DISINTEGRATING ORAL EVERY 8 HOURS PRN
Status: DISCONTINUED | OUTPATIENT
Start: 2024-08-06 | End: 2024-08-13 | Stop reason: HOSPADM

## 2024-08-06 RX ORDER — POTASSIUM CHLORIDE 20 MEQ/1
40 TABLET, EXTENDED RELEASE ORAL PRN
Status: DISCONTINUED | OUTPATIENT
Start: 2024-08-06 | End: 2024-08-13 | Stop reason: HOSPADM

## 2024-08-06 RX ORDER — ACETAMINOPHEN 325 MG/1
650 TABLET ORAL EVERY 6 HOURS PRN
Status: DISCONTINUED | OUTPATIENT
Start: 2024-08-06 | End: 2024-08-07

## 2024-08-06 RX ORDER — GABAPENTIN 300 MG/1
300 CAPSULE ORAL DAILY
Status: DISCONTINUED | OUTPATIENT
Start: 2024-08-07 | End: 2024-08-13 | Stop reason: HOSPADM

## 2024-08-06 RX ORDER — SODIUM CHLORIDE 0.9 % (FLUSH) 0.9 %
5-40 SYRINGE (ML) INJECTION PRN
Status: DISCONTINUED | OUTPATIENT
Start: 2024-08-06 | End: 2024-08-13 | Stop reason: HOSPADM

## 2024-08-06 RX ORDER — LORAZEPAM 2 MG/ML
4 INJECTION INTRAMUSCULAR EVERY 5 MIN PRN
Status: DISCONTINUED | OUTPATIENT
Start: 2024-08-06 | End: 2024-08-13 | Stop reason: HOSPADM

## 2024-08-06 RX ORDER — POTASSIUM CHLORIDE 7.45 MG/ML
10 INJECTION INTRAVENOUS PRN
Status: DISCONTINUED | OUTPATIENT
Start: 2024-08-06 | End: 2024-08-13 | Stop reason: HOSPADM

## 2024-08-06 RX ORDER — ONDANSETRON 2 MG/ML
4 INJECTION INTRAMUSCULAR; INTRAVENOUS EVERY 6 HOURS PRN
Status: DISCONTINUED | OUTPATIENT
Start: 2024-08-06 | End: 2024-08-13 | Stop reason: HOSPADM

## 2024-08-06 RX ORDER — TAMSULOSIN HYDROCHLORIDE 0.4 MG/1
0.4 CAPSULE ORAL DAILY
Status: DISCONTINUED | OUTPATIENT
Start: 2024-08-07 | End: 2024-08-13 | Stop reason: HOSPADM

## 2024-08-06 RX ORDER — SODIUM CHLORIDE 0.9 % (FLUSH) 0.9 %
5-40 SYRINGE (ML) INJECTION EVERY 12 HOURS SCHEDULED
Status: DISCONTINUED | OUTPATIENT
Start: 2024-08-06 | End: 2024-08-13 | Stop reason: HOSPADM

## 2024-08-06 RX ORDER — MIRTAZAPINE 15 MG/1
15 TABLET, FILM COATED ORAL NIGHTLY
Status: DISCONTINUED | OUTPATIENT
Start: 2024-08-07 | End: 2024-08-13 | Stop reason: HOSPADM

## 2024-08-06 RX ORDER — ACETAMINOPHEN 650 MG/1
650 SUPPOSITORY RECTAL EVERY 6 HOURS PRN
Status: DISCONTINUED | OUTPATIENT
Start: 2024-08-06 | End: 2024-08-07

## 2024-08-06 RX ORDER — AMLODIPINE BESYLATE 10 MG/1
10 TABLET ORAL DAILY
Status: DISCONTINUED | OUTPATIENT
Start: 2024-08-07 | End: 2024-08-13 | Stop reason: HOSPADM

## 2024-08-06 RX ORDER — SODIUM CHLORIDE 9 MG/ML
INJECTION, SOLUTION INTRAVENOUS PRN
Status: DISCONTINUED | OUTPATIENT
Start: 2024-08-06 | End: 2024-08-13 | Stop reason: HOSPADM

## 2024-08-06 RX ORDER — LEVETIRACETAM 500 MG/1
1000 TABLET ORAL 2 TIMES DAILY
Status: DISCONTINUED | OUTPATIENT
Start: 2024-08-07 | End: 2024-08-07

## 2024-08-06 RX ORDER — POLYETHYLENE GLYCOL 3350 17 G/17G
17 POWDER, FOR SOLUTION ORAL DAILY PRN
Status: DISCONTINUED | OUTPATIENT
Start: 2024-08-06 | End: 2024-08-13 | Stop reason: HOSPADM

## 2024-08-06 RX ORDER — POLYVINYL ALCOHOL 14 MG/ML
1 SOLUTION/ DROPS OPHTHALMIC 2 TIMES DAILY
Status: DISCONTINUED | OUTPATIENT
Start: 2024-08-07 | End: 2024-08-13 | Stop reason: HOSPADM

## 2024-08-06 RX ORDER — CETIRIZINE HYDROCHLORIDE 10 MG/1
10 TABLET ORAL DAILY
Status: DISCONTINUED | OUTPATIENT
Start: 2024-08-07 | End: 2024-08-07

## 2024-08-06 RX ORDER — MAGNESIUM SULFATE IN WATER 40 MG/ML
2000 INJECTION, SOLUTION INTRAVENOUS PRN
Status: DISCONTINUED | OUTPATIENT
Start: 2024-08-06 | End: 2024-08-13 | Stop reason: HOSPADM

## 2024-08-06 RX ORDER — LANOLIN ALCOHOL/MO/W.PET/CERES
10 CREAM (GRAM) TOPICAL NIGHTLY
Status: DISCONTINUED | OUTPATIENT
Start: 2024-08-07 | End: 2024-08-13 | Stop reason: HOSPADM

## 2024-08-06 RX ORDER — ALBUTEROL SULFATE 90 UG/1
2 AEROSOL, METERED RESPIRATORY (INHALATION) EVERY 4 HOURS PRN
Status: DISCONTINUED | OUTPATIENT
Start: 2024-08-06 | End: 2024-08-07

## 2024-08-06 RX ORDER — PRAVASTATIN SODIUM 40 MG
40 TABLET ORAL DAILY
Status: DISCONTINUED | OUTPATIENT
Start: 2024-08-07 | End: 2024-08-13 | Stop reason: HOSPADM

## 2024-08-06 RX ADMIN — IOPAMIDOL 80 ML: 755 INJECTION, SOLUTION INTRAVENOUS at 21:21

## 2024-08-06 RX ADMIN — LEVETIRACETAM 1000 MG: 100 INJECTION INTRAVENOUS at 23:51

## 2024-08-06 ASSESSMENT — PAIN DESCRIPTION - LOCATION: LOCATION: ABDOMEN

## 2024-08-06 ASSESSMENT — PAIN DESCRIPTION - PAIN TYPE: TYPE: ACUTE PAIN

## 2024-08-06 ASSESSMENT — PAIN SCALES - GENERAL: PAINLEVEL_OUTOF10: 4

## 2024-08-06 ASSESSMENT — PAIN - FUNCTIONAL ASSESSMENT: PAIN_FUNCTIONAL_ASSESSMENT: 0-10

## 2024-08-06 NOTE — ED TRIAGE NOTES
Patient presents via [facility]  to ER with complaints of bleeding out of penis in which started today around 1800. Patient reports he felt liquid going down leg and saw it was blood. Patient reports he just had a suprapubic catheter placed today. Small amount of urine noted in bag. Penile bleeding controlled. Bladder scan completed with result 50ml.

## 2024-08-07 ENCOUNTER — APPOINTMENT (OUTPATIENT)
Dept: GENERAL RADIOLOGY | Age: 79
DRG: 919 | End: 2024-08-07
Payer: MEDICARE

## 2024-08-07 ENCOUNTER — APPOINTMENT (OUTPATIENT)
Dept: CT IMAGING | Age: 79
DRG: 919 | End: 2024-08-07
Payer: MEDICARE

## 2024-08-07 PROBLEM — Z51.5 PALLIATIVE CARE PATIENT: Status: ACTIVE | Noted: 2024-08-07

## 2024-08-07 PROBLEM — G40.919 BREAKTHROUGH SEIZURE (HCC): Status: ACTIVE | Noted: 2024-08-07

## 2024-08-07 PROBLEM — R58 BLEEDING: Status: ACTIVE | Noted: 2024-08-07

## 2024-08-07 LAB
ALBUMIN SERPL BCG-MCNC: 4.1 G/DL (ref 3.5–5.1)
ALP SERPL-CCNC: 139 U/L (ref 38–126)
ALT SERPL W/O P-5'-P-CCNC: 11 U/L (ref 11–66)
ANION GAP SERPL CALC-SCNC: 13 MEQ/L (ref 8–16)
AST SERPL-CCNC: 15 U/L (ref 5–40)
BACTERIA URNS QL MICRO: ABNORMAL /HPF
BACTERIA: ABNORMAL
BASOPHILS ABSOLUTE: 0 THOU/MM3 (ref 0–0.1)
BASOPHILS NFR BLD AUTO: 0.3 %
BILIRUB CONJ SERPL-MCNC: < 0.1 MG/DL (ref 0.1–13.8)
BILIRUB SERPL-MCNC: 0.3 MG/DL (ref 0.3–1.2)
BILIRUB UR QL STRIP.AUTO: NEGATIVE
BILIRUB UR QL STRIP: NEGATIVE
BUN SERPL-MCNC: 19 MG/DL (ref 7–22)
CA-I BLD ISE-SCNC: 1.16 MMOL/L (ref 1.12–1.32)
CALCIUM SERPL-MCNC: 8.4 MG/DL (ref 8.5–10.5)
CASTS #/AREA URNS LPF: ABNORMAL /LPF
CASTS 2: ABNORMAL /LPF
CHARACTER UR: ABNORMAL
CHARACTER UR: ABNORMAL
CHARCOAL URNS QL MICRO: ABNORMAL
CHLORIDE SERPL-SCNC: 108 MEQ/L (ref 98–111)
CO2 SERPL-SCNC: 20 MEQ/L (ref 23–33)
COLOR UR: YELLOW
COLOR, UA: YELLOW
CREAT SERPL-MCNC: 1.1 MG/DL (ref 0.4–1.2)
CRYSTALS URNS MICRO: ABNORMAL
CRYSTALS URNS QL MICRO: ABNORMAL
DEPRECATED RDW RBC AUTO: 47 FL (ref 35–45)
EKG ATRIAL RATE: 92 BPM
EKG P AXIS: 77 DEGREES
EKG P-R INTERVAL: 166 MS
EKG Q-T INTERVAL: 380 MS
EKG QRS DURATION: 88 MS
EKG QTC CALCULATION (BAZETT): 469 MS
EKG R AXIS: 35 DEGREES
EKG T AXIS: 65 DEGREES
EKG VENTRICULAR RATE: 92 BPM
EOSINOPHIL NFR BLD AUTO: 0.9 %
EOSINOPHILS ABSOLUTE: 0.1 THOU/MM3 (ref 0–0.4)
EPITHELIAL CELLS, UA: ABNORMAL /HPF
EPITHELIAL CELLS, UA: ABNORMAL /HPF
ERYTHROCYTE [DISTWIDTH] IN BLOOD BY AUTOMATED COUNT: 13.2 % (ref 11.5–14.5)
GFR SERPL CREATININE-BSD FRML MDRD: 68 ML/MIN/1.73M2
GLUCOSE BLD STRIP.AUTO-MCNC: 112 MG/DL (ref 70–108)
GLUCOSE SERPL-MCNC: 101 MG/DL (ref 70–108)
GLUCOSE UR QL STRIP.AUTO: NEGATIVE MG/DL
GLUCOSE UR QL STRIP.AUTO: NEGATIVE MG/DL
HCT VFR BLD AUTO: 32.4 % (ref 42–52)
HGB BLD-MCNC: 11 GM/DL (ref 14–18)
HGB UR QL STRIP.AUTO: ABNORMAL
HGB UR QL STRIP.AUTO: ABNORMAL
IMM GRANULOCYTES # BLD AUTO: 0.03 THOU/MM3 (ref 0–0.07)
IMM GRANULOCYTES NFR BLD AUTO: 0.4 %
KEPPRA: 32 UG/ML
KETONES UR QL STRIP.AUTO: ABNORMAL
KETONES UR QL STRIP.AUTO: NEGATIVE
LACTATE SERPL-SCNC: 1.4 MMOL/L (ref 0.5–2)
LACTATE SERPL-SCNC: 1.4 MMOL/L (ref 0.5–2)
LACTATE SERPL-SCNC: 4.3 MMOL/L (ref 0.5–2)
LEUKOCYTE ESTERASE UR QL STRIP.AUTO: ABNORMAL
LYMPHOCYTES ABSOLUTE: 1.3 THOU/MM3 (ref 1–4.8)
LYMPHOCYTES NFR BLD AUTO: 16.6 %
MAGNESIUM SERPL-MCNC: 1.9 MG/DL (ref 1.6–2.4)
MAGNESIUM SERPL-MCNC: 2.1 MG/DL (ref 1.6–2.4)
MCH RBC QN AUTO: 32.9 PG (ref 26–33)
MCHC RBC AUTO-ENTMCNC: 34 GM/DL (ref 32.2–35.5)
MCV RBC AUTO: 97 FL (ref 80–94)
MISCELLANEOUS 2: ABNORMAL
MONOCYTES ABSOLUTE: 0.9 THOU/MM3 (ref 0.4–1.3)
MONOCYTES NFR BLD AUTO: 11.2 %
NEUTROPHILS ABSOLUTE: 5.4 THOU/MM3 (ref 1.8–7.7)
NEUTROPHILS NFR BLD AUTO: 70.6 %
NITRITE UR QL STRIP.AUTO: NEGATIVE
NITRITE UR QL STRIP: NEGATIVE
NRBC BLD AUTO-RTO: 0 /100 WBC
OSMOLALITY SERPL CALC.SUM OF ELEC: 283.7 MOSMOL/KG (ref 275–300)
PH UR STRIP.AUTO: 5 [PH] (ref 5–9)
PH UR STRIP.AUTO: 5 [PH] (ref 5–9)
PLATELET # BLD AUTO: 189 THOU/MM3 (ref 130–400)
PMV BLD AUTO: 10.2 FL (ref 9.4–12.4)
POTASSIUM SERPL-SCNC: 4.3 MEQ/L (ref 3.5–5.2)
POTASSIUM SERPL-SCNC: 4.8 MEQ/L (ref 3.5–5.2)
PROLACTIN SERPL-MCNC: 7.6 NG/ML
PROT SERPL-MCNC: 6.7 G/DL (ref 6.1–8)
PROT UR STRIP.AUTO-MCNC: 30 MG/DL
PROT UR STRIP.AUTO-MCNC: 30 MG/DL
RBC # BLD AUTO: 3.34 MILL/MM3 (ref 4.7–6.1)
RBC #/AREA URNS HPF: > 200 /HPF
RBC URINE: > 200 /HPF
RENAL EPI CELLS #/AREA URNS HPF: ABNORMAL /[HPF]
RENAL EPI CELLS #/AREA URNS HPF: ABNORMAL /[HPF]
SODIUM SERPL-SCNC: 141 MEQ/L (ref 135–145)
SP GR UR REFRACT.AUTO: 1.02 (ref 1–1.03)
SPECIFIC GRAVITY UA: > 1.03 (ref 1–1.03)
TSH SERPL DL<=0.005 MIU/L-ACNC: 0.46 UIU/ML (ref 0.4–4.2)
UROBILINOGEN, URINE: 0.2 EU/DL (ref 0–1)
UROBILINOGEN, URINE: 0.2 EU/DL (ref 0–1)
WBC # BLD AUTO: 7.6 THOU/MM3 (ref 4.8–10.8)
WBC #/AREA URNS HPF: > 100 /HPF
WBC #/AREA URNS HPF: ABNORMAL /HPF
WBC #/AREA URNS HPF: ABNORMAL /[HPF]
YEAST LIKE FUNGI URNS QL MICRO: ABNORMAL
YEAST LIKE FUNGI URNS QL MICRO: ABNORMAL

## 2024-08-07 PROCEDURE — 6360000002 HC RX W HCPCS

## 2024-08-07 PROCEDURE — 84132 ASSAY OF SERUM POTASSIUM: CPT

## 2024-08-07 PROCEDURE — 2580000003 HC RX 258

## 2024-08-07 PROCEDURE — 80048 BASIC METABOLIC PNL TOTAL CA: CPT

## 2024-08-07 PROCEDURE — 82948 REAGENT STRIP/BLOOD GLUCOSE: CPT

## 2024-08-07 PROCEDURE — 36410 VNPNXR 3YR/> PHY/QHP DX/THER: CPT

## 2024-08-07 PROCEDURE — 83605 ASSAY OF LACTIC ACID: CPT

## 2024-08-07 PROCEDURE — 2580000003 HC RX 258: Performed by: NURSE PRACTITIONER

## 2024-08-07 PROCEDURE — 99233 SBSQ HOSP IP/OBS HIGH 50: CPT | Performed by: NURSE PRACTITIONER

## 2024-08-07 PROCEDURE — 6370000000 HC RX 637 (ALT 250 FOR IP)

## 2024-08-07 PROCEDURE — 96376 TX/PRO/DX INJ SAME DRUG ADON: CPT

## 2024-08-07 PROCEDURE — 96366 THER/PROPH/DIAG IV INF ADDON: CPT

## 2024-08-07 PROCEDURE — 85025 COMPLETE CBC W/AUTO DIFF WBC: CPT

## 2024-08-07 PROCEDURE — 99221 1ST HOSP IP/OBS SF/LOW 40: CPT | Performed by: STUDENT IN AN ORGANIZED HEALTH CARE EDUCATION/TRAINING PROGRAM

## 2024-08-07 PROCEDURE — 96372 THER/PROPH/DIAG INJ SC/IM: CPT

## 2024-08-07 PROCEDURE — 87641 MR-STAPH DNA AMP PROBE: CPT

## 2024-08-07 PROCEDURE — G0378 HOSPITAL OBSERVATION PER HR: HCPCS

## 2024-08-07 PROCEDURE — 83735 ASSAY OF MAGNESIUM: CPT

## 2024-08-07 PROCEDURE — 93005 ELECTROCARDIOGRAM TRACING: CPT | Performed by: NURSE PRACTITIONER

## 2024-08-07 PROCEDURE — 76937 US GUIDE VASCULAR ACCESS: CPT

## 2024-08-07 PROCEDURE — 6360000002 HC RX W HCPCS: Performed by: NURSE PRACTITIONER

## 2024-08-07 PROCEDURE — 84443 ASSAY THYROID STIM HORMONE: CPT

## 2024-08-07 PROCEDURE — 6370000000 HC RX 637 (ALT 250 FOR IP): Performed by: NURSE PRACTITIONER

## 2024-08-07 PROCEDURE — 96361 HYDRATE IV INFUSION ADD-ON: CPT

## 2024-08-07 PROCEDURE — 96367 TX/PROPH/DG ADDL SEQ IV INF: CPT

## 2024-08-07 PROCEDURE — 87186 SC STD MICRODIL/AGAR DIL: CPT

## 2024-08-07 PROCEDURE — 87086 URINE CULTURE/COLONY COUNT: CPT

## 2024-08-07 PROCEDURE — 71045 X-RAY EXAM CHEST 1 VIEW: CPT

## 2024-08-07 PROCEDURE — 80076 HEPATIC FUNCTION PANEL: CPT

## 2024-08-07 PROCEDURE — 95816 EEG AWAKE AND DROWSY: CPT

## 2024-08-07 PROCEDURE — 36415 COLL VENOUS BLD VENIPUNCTURE: CPT

## 2024-08-07 PROCEDURE — 87077 CULTURE AEROBIC IDENTIFY: CPT

## 2024-08-07 PROCEDURE — 81001 URINALYSIS AUTO W/SCOPE: CPT

## 2024-08-07 PROCEDURE — 74018 RADEX ABDOMEN 1 VIEW: CPT

## 2024-08-07 PROCEDURE — 82330 ASSAY OF CALCIUM: CPT

## 2024-08-07 PROCEDURE — 05HC33Z INSERTION OF INFUSION DEVICE INTO LEFT BASILIC VEIN, PERCUTANEOUS APPROACH: ICD-10-PCS | Performed by: STUDENT IN AN ORGANIZED HEALTH CARE EDUCATION/TRAINING PROGRAM

## 2024-08-07 PROCEDURE — 94761 N-INVAS EAR/PLS OXIMETRY MLT: CPT

## 2024-08-07 PROCEDURE — 51798 US URINE CAPACITY MEASURE: CPT

## 2024-08-07 PROCEDURE — C1751 CATH, INF, PER/CENT/MIDLINE: HCPCS

## 2024-08-07 PROCEDURE — 99223 1ST HOSP IP/OBS HIGH 75: CPT

## 2024-08-07 PROCEDURE — 93005 ELECTROCARDIOGRAM TRACING: CPT

## 2024-08-07 PROCEDURE — 87040 BLOOD CULTURE FOR BACTERIA: CPT

## 2024-08-07 PROCEDURE — 93010 ELECTROCARDIOGRAM REPORT: CPT | Performed by: INTERNAL MEDICINE

## 2024-08-07 PROCEDURE — 96375 TX/PRO/DX INJ NEW DRUG ADDON: CPT

## 2024-08-07 PROCEDURE — 70450 CT HEAD/BRAIN W/O DYE: CPT

## 2024-08-07 RX ORDER — SODIUM CHLORIDE 9 MG/ML
INJECTION, SOLUTION INTRAVENOUS PRN
Status: DISCONTINUED | OUTPATIENT
Start: 2024-08-07 | End: 2024-08-13 | Stop reason: HOSPADM

## 2024-08-07 RX ORDER — SODIUM CHLORIDE 9 MG/ML
INJECTION, SOLUTION INTRAVENOUS CONTINUOUS
Status: ACTIVE | OUTPATIENT
Start: 2024-08-07 | End: 2024-08-07

## 2024-08-07 RX ORDER — ACETAMINOPHEN 650 MG/1
650 SUPPOSITORY RECTAL EVERY 4 HOURS PRN
Status: DISCONTINUED | OUTPATIENT
Start: 2024-08-07 | End: 2024-08-13 | Stop reason: HOSPADM

## 2024-08-07 RX ORDER — SODIUM CHLORIDE 9 MG/ML
INJECTION, SOLUTION INTRAVENOUS CONTINUOUS
Status: ACTIVE | OUTPATIENT
Start: 2024-08-07 | End: 2024-08-08

## 2024-08-07 RX ORDER — ACETAMINOPHEN 325 MG/1
650 TABLET ORAL EVERY 4 HOURS PRN
Status: DISCONTINUED | OUTPATIENT
Start: 2024-08-07 | End: 2024-08-13 | Stop reason: HOSPADM

## 2024-08-07 RX ORDER — SODIUM CHLORIDE 0.9 % (FLUSH) 0.9 %
5-40 SYRINGE (ML) INJECTION EVERY 12 HOURS SCHEDULED
Status: DISCONTINUED | OUTPATIENT
Start: 2024-08-07 | End: 2024-08-13 | Stop reason: HOSPADM

## 2024-08-07 RX ORDER — KETOROLAC TROMETHAMINE 30 MG/ML
15 INJECTION, SOLUTION INTRAMUSCULAR; INTRAVENOUS
Status: COMPLETED | OUTPATIENT
Start: 2024-08-07 | End: 2024-08-07

## 2024-08-07 RX ORDER — OXCARBAZEPINE 300 MG/1
450 TABLET, FILM COATED ORAL 2 TIMES DAILY
Status: DISCONTINUED | OUTPATIENT
Start: 2024-08-07 | End: 2024-08-13 | Stop reason: HOSPADM

## 2024-08-07 RX ORDER — SODIUM CHLORIDE 0.9 % (FLUSH) 0.9 %
5-40 SYRINGE (ML) INJECTION PRN
Status: DISCONTINUED | OUTPATIENT
Start: 2024-08-07 | End: 2024-08-13 | Stop reason: HOSPADM

## 2024-08-07 RX ORDER — 0.9 % SODIUM CHLORIDE 0.9 %
500 INTRAVENOUS SOLUTION INTRAVENOUS ONCE
Status: COMPLETED | OUTPATIENT
Start: 2024-08-07 | End: 2024-08-08

## 2024-08-07 RX ADMIN — POLYVINYL ALCOHOL 1 DROP: 1.4 SOLUTION/ DROPS OPHTHALMIC at 19:41

## 2024-08-07 RX ADMIN — OXCARBAZEPINE 450 MG: 300 TABLET, FILM COATED ORAL at 19:41

## 2024-08-07 RX ADMIN — KETOROLAC TROMETHAMINE 15 MG: 30 INJECTION, SOLUTION INTRAMUSCULAR at 02:42

## 2024-08-07 RX ADMIN — VANCOMYCIN HYDROCHLORIDE 2000 MG: 1 INJECTION, POWDER, LYOPHILIZED, FOR SOLUTION INTRAVENOUS at 15:25

## 2024-08-07 RX ADMIN — ACETAMINOPHEN 650 MG: 325 TABLET ORAL at 00:43

## 2024-08-07 RX ADMIN — SODIUM CHLORIDE: 9 INJECTION, SOLUTION INTRAVENOUS at 19:29

## 2024-08-07 RX ADMIN — LISINOPRIL 10 MG: 10 TABLET ORAL at 09:51

## 2024-08-07 RX ADMIN — POLYVINYL ALCOHOL 1 DROP: 1.4 SOLUTION/ DROPS OPHTHALMIC at 10:00

## 2024-08-07 RX ADMIN — PRAVASTATIN SODIUM 40 MG: 40 TABLET ORAL at 09:51

## 2024-08-07 RX ADMIN — LEVETIRACETAM 1250 MG: 500 TABLET, FILM COATED ORAL at 19:41

## 2024-08-07 RX ADMIN — SODIUM CHLORIDE, PRESERVATIVE FREE 10 ML: 5 INJECTION INTRAVENOUS at 10:00

## 2024-08-07 RX ADMIN — SODIUM CHLORIDE, PRESERVATIVE FREE 5 ML: 5 INJECTION INTRAVENOUS at 00:35

## 2024-08-07 RX ADMIN — LEVETIRACETAM 1654 MG: 100 INJECTION INTRAVENOUS at 11:04

## 2024-08-07 RX ADMIN — PIPERACILLIN AND TAZOBACTAM 4500 MG: 4; .5 INJECTION, POWDER, FOR SOLUTION INTRAVENOUS at 13:24

## 2024-08-07 RX ADMIN — MIRTAZAPINE 15 MG: 15 TABLET, FILM COATED ORAL at 19:41

## 2024-08-07 RX ADMIN — GABAPENTIN 300 MG: 300 CAPSULE ORAL at 09:51

## 2024-08-07 RX ADMIN — ENOXAPARIN SODIUM 40 MG: 100 INJECTION SUBCUTANEOUS at 13:53

## 2024-08-07 RX ADMIN — SODIUM CHLORIDE: 9 INJECTION, SOLUTION INTRAVENOUS at 04:28

## 2024-08-07 RX ADMIN — Medication 10.5 MG: at 19:41

## 2024-08-07 RX ADMIN — LEVETIRACETAM 1250 MG: 500 TABLET, FILM COATED ORAL at 09:51

## 2024-08-07 RX ADMIN — SODIUM CHLORIDE, PRESERVATIVE FREE 10 ML: 5 INJECTION INTRAVENOUS at 19:45

## 2024-08-07 RX ADMIN — ACETAMINOPHEN 650 MG: 325 TABLET ORAL at 18:16

## 2024-08-07 RX ADMIN — AMLODIPINE BESYLATE 10 MG: 10 TABLET ORAL at 09:51

## 2024-08-07 RX ADMIN — LORAZEPAM 4 MG: 2 INJECTION INTRAMUSCULAR; INTRAVENOUS at 03:50

## 2024-08-07 RX ADMIN — ACETAMINOPHEN 650 MG: 325 TABLET ORAL at 22:38

## 2024-08-07 RX ADMIN — SODIUM CHLORIDE: 9 INJECTION, SOLUTION INTRAVENOUS at 15:01

## 2024-08-07 RX ADMIN — PIPERACILLIN AND TAZOBACTAM 3375 MG: 3; .375 INJECTION, POWDER, FOR SOLUTION INTRAVENOUS at 20:51

## 2024-08-07 RX ADMIN — OXCARBAZEPINE 450 MG: 300 TABLET, FILM COATED ORAL at 11:06

## 2024-08-07 RX ADMIN — ACETAMINOPHEN 650 MG: 650 SUPPOSITORY RECTAL at 12:51

## 2024-08-07 RX ADMIN — TAMSULOSIN HYDROCHLORIDE 0.4 MG: 0.4 CAPSULE ORAL at 09:51

## 2024-08-07 RX ADMIN — SODIUM CHLORIDE 500 ML: 9 INJECTION, SOLUTION INTRAVENOUS at 23:02

## 2024-08-07 ASSESSMENT — PAIN DESCRIPTION - DESCRIPTORS
DESCRIPTORS: ACHING

## 2024-08-07 ASSESSMENT — PAIN SCALES - GENERAL
PAINLEVEL_OUTOF10: 5
PAINLEVEL_OUTOF10: 0
PAINLEVEL_OUTOF10: 9
PAINLEVEL_OUTOF10: 4
PAINLEVEL_OUTOF10: 6
PAINLEVEL_OUTOF10: 4

## 2024-08-07 ASSESSMENT — PAIN - FUNCTIONAL ASSESSMENT
PAIN_FUNCTIONAL_ASSESSMENT: PREVENTS OR INTERFERES SOME ACTIVE ACTIVITIES AND ADLS

## 2024-08-07 ASSESSMENT — PAIN DESCRIPTION - ONSET: ONSET: ON-GOING

## 2024-08-07 ASSESSMENT — LIFESTYLE VARIABLES
HOW OFTEN DO YOU HAVE A DRINK CONTAINING ALCOHOL: NEVER
HOW MANY STANDARD DRINKS CONTAINING ALCOHOL DO YOU HAVE ON A TYPICAL DAY: PATIENT DOES NOT DRINK

## 2024-08-07 ASSESSMENT — PAIN DESCRIPTION - ORIENTATION
ORIENTATION: LOWER
ORIENTATION: LOWER;MID
ORIENTATION: LOWER

## 2024-08-07 ASSESSMENT — PAIN DESCRIPTION - PAIN TYPE
TYPE: ACUTE PAIN;SURGICAL PAIN
TYPE: ACUTE PAIN;SURGICAL PAIN

## 2024-08-07 ASSESSMENT — PAIN DESCRIPTION - LOCATION
LOCATION: ABDOMEN

## 2024-08-07 ASSESSMENT — PAIN DESCRIPTION - FREQUENCY: FREQUENCY: CONTINUOUS

## 2024-08-07 NOTE — SIGNIFICANT EVENT
0701: Call received from BRII Flores stating that patient was trying to get out of bed stating he had to have a BM. This RN went to patient's room. This RN went to patient's room and placed on bedpan due to patient being drowsy from ativan given prior in the shift.   Patient requested privacy. This RN stepped out of room.     0708: This RN and BRII Savage went back to room to check on patient's status with having BM. Patient was found to be drowsy. Responsive but not answering questions. Rolling and messing with fingers.      Patient then started shaking in BUE and throughout trunk. Attempts to assess patient and his mentation completed. Unable to do so. BRII Savage went to obtain ativan PRN for seizures. When BRII Savage had returned. Patient was now lethargic but continued to remain tremoring/seizing throughout BUE.     0713: Rapid response called due to patient being unresponsive.   Pupils sluggish which is a new change.     Elisabeth Keenan, COSME, Gloria, House Supervisor. BRII Geiger. Dr Mckinney. BRII Bob. BRII Montoya and Dr. Nichole (Resident) present. RT, Lab, EKG all responded.     0716: Blood glucose: 112   Ativan not given per provider.     Vital signs obtained: 98.1 temperature, BP: 148/75. HR: 95, 95% on room air.     Seizure lasted approximately: 2 minutes and 37 seconds before patient stopped shaking in BUE but did take time to come back to alertness.   0719: Remained confused when patient came to. Unable to answer orientation questions. Stated that it was \"November 1926\" and when asked where he was stated: \"Basement\".    0721: Labs drawn. See orders.    0721: CT head ordered. See Orders.    0721: Transfer order placed.     0726: Patient transferred by rapid response team, house supervisor and this RN to CT.    Following CT completion, patient transferred to [facility]. Report given and all questions answered.     Dr. Nichole (resident) was to call family. Stated voicemail was left.     When returning to floor, patient's

## 2024-08-07 NOTE — ED NOTES
This RN and Marci RN called to patient room at this time. Patient family states patient is having a seizure and has history of seizures. Family states he started to have a gaze, talk nonsense and mess with his hands. Dr Rainey to bedside. Order for Keppra placed at this time via verbal order from Dr Rainey.

## 2024-08-07 NOTE — PROCEDURES
PROCEDURE NOTE  Date: 8/7/2024   Name: Ceferino Sanches Jr  YOB: 1945    Procedures EKG completed, given to

## 2024-08-07 NOTE — CARE COORDINATION
Case Management Assessment Initial Evaluation    Date/Time of Evaluation: 2024 8:02 AM  Assessment Completed by: Kiana Garner RN    If patient is discharged prior to next notation, then this note serves as note for discharge by case management.    Patient Name: Ceferino Saini Jr                   YOB: 1945  Diagnosis: Bleeding [R58]  Seizure disorder (HCC) [G40.909]  Seizure-like activity (HCC) [R56.9]  Dementia without behavioral disturbance (HCC) [F03.90]                   Date / Time: 2024  6:35 PM  Location: [address] [place] [address]     Patient Admission Status: Observation   Readmission Risk Low 0-14, Mod 15-19), High > 20: Readmission Risk Score: 22.3    Current PCP: Jihan Lyon MD  Health Care Decision Makers:   Primary Decision Maker: ALLI SAINI - Brother/Sister - [phone]    Additional Case Management Notes: Presented from assisted living with concerns  post suprapubic catheter placement. Transferred to  after rapid response for seizure like activity. Hospitalist and neuro following. IVF. Norvasc. Keppra. Lisinopril. Pravachol. Flomax. PT/OT.     Procedures: none    Imagin/6 CT abdomen/pelvis:   1. Suprapubic catheter in place. No cutaneous or subcutaneous edema along   the catheter tract. No abscess or hematoma.  2. No obstructive or acute inflammatory changes in the gastrointestinal   and genitourinary tracts.  3. Nonobstructing bilateral punctate nephrolithiasis.  4. Cholelithiasis.   CT head: No evidence of an acute process. No change from prior.     Patient Goals/Plan/Treatment Preferences: From Jarrett CIFUENTES Complete assessment deferred to Teodoro

## 2024-08-07 NOTE — PROGRESS NOTES
PowerGlide Insertion Procedure Note  This line is NOT a central line, please do not use this for central solutions.   Line can be removed by the primary nurse or LPN, but should not be removed by the tech.  Line can be used to collect labs, but is NOT guaranteed to draw blood the entire duration of dwell.   Cathflo CANNOT be used on this device.   Patient CAN discharge home/to SNF with this device if ordered.   PowerGlide device can dwell for up to 29 days.     Ceferino Sanches Jr   Admitted- 8/6/2024  6:35 PM  Admission diagnosis- Bleeding [R58]  Seizure disorder (HCC) [G40.909]  Seizure-like activity (HCC) [R56.9]  Dementia without behavioral disturbance (HCC) [F03.90]      Attending Physician- Elisabeth Keenan APRN*    Indication for Insertion: Poor Vascular Access    Catheter Insertion Date- 8/7/2024   Catheter Brand-Bard  Lot Number- [id number]  Gauge-20  Lumen-single    Insertion Site- SALMA Basilic  Vein Diameter- 2.5 mm  Catheter Length- 10 cm  Internal Length- 10 cm     Midline Tip Terminates in the Axillary- Yes  Upper Arm Circumference- 33.5cm  Easy insertion- Yes  Able to Aspirate blood- Yes  Easy Flush- Yes    Midline insertion successful- Yes  Ultrasound- yes    Okay To Use Midline- Yes      Electronically signed by Darcy Best RN, on 8/7/2024 at 3:21 PM

## 2024-08-07 NOTE — PROGRESS NOTES
[facility]     Neurodiagnostic Laboratory Technician Worksheet      EEG Date: 2024    Name: Ceferino Sanches Jr  : 1945  Age: 78 y.o.  Sex: male  MRN: [id number]  CSN: [id number]    Room/Location: [address] [place] [address]  Ordering Provider: SHANNA Alexandra    EEG Number: [id number]    Time In: 1018  Time Out: 1038  Total Treatment Time: 20 mins stat    Clinical History: bue tremors, talking gibberish, able to talk through bue shakes, sleepy not sleep deprived    .ct  IMPRESSION:  No evidence of an acute process. No change from prior.       Hand Dominance: both   Sedation: No   H.V. Completed: No, age protocol   Photic: Yes   Sleep: No   Drowsy: yes   Sleep Deprived: No   Seizures Observed: Bue and jaw tremors with one time of more pronounced with head tremors during photic   Mentality: alert     Technician: Estelle Carreno 2024

## 2024-08-07 NOTE — PROGRESS NOTES
Hospitalist Progress Note    Patient:  Ceferino Sanches Jr      Unit/Bed:7K-15/015-A    YOB: 1945    MRN: [id number]       Acct: [id number]     PCP: Jihan Lyon MD    Date of Admission: 8/6/2024    Assessment/Plan:    Sepsis, not POA--unsure source at this time, obtaining 2 blood cultures, repeating lactic acid, urine cultures pending; adding Zosyn from 8/7 along with vancomycin with pharmacy to dose from 8/7  Seizure-like activity--seizure precautions, patient was given Keppra IV as his home dose is Keppra XR 1250 mg twice daily, await neurology input; CT head did not reveal anything acute; EEG was just finished  Surgical site bleeding secondary to suprapubic catheter placed on 8/6/2024--on Flomax; CT abdomen pelvis done on 8/6 showed SP catheter in place no abscess or hematoma, no obstructive or acute inflammatory changes in the GI and  tract, nonobstructing bilateral punctate nephrolithiasis; will have urology evaluate as still some bleeding noted; will obtain stat KUB  Chronic normocytic anemia--stable, monitor  CKD stage II  Primary hypertension, uncontrolled--on Norvasc, lisinopril; monitor  Hyperkalemia--resolved  Hyperlipidemia--on statin  WILLIAM/dementia/insomnia--patient sister confirmed with RN that there is a history of dementia  Physical deconditioning/debility--likely secondary to advanced age and numerous comorbid medical conditions; PT/OT and social work on the case      Expected discharge date: Pending clinical course    Disposition:    [] Home       [] TCU       [] Rehab       [] Psych       [x] SNF       [] Long Term Care Facility       [] Other-    Chief Complaint: Postop problem and seizure activity    Hospital Course: Per H&P done 8/6/2024: \"Ceferino Sanches Jr is a 78 y.o. male with PMHx of Seizure disorder, urinary retention, HTN who presented to [facility] with chief complaint of postop problem. Patient reports he went home after his procedure and he was sitting  dose modulation techniques and iterative reconstructions, and/or weight-based dosing when appropriate to reduce radiation to a low as reasonably achievable.      Code Status: Full Code    Tele:   [x] yes however telemetry is not working             [] no    LDA: []CVC / []PICC / []Midline / []John / []Drains / []Mediport / []None SP cath placed on 8/6/2024  Antibiotics: None  Steroids: None  Labs (still needed?): [x]Yes / []No  IVF (still needed?): [x]Yes / []No    Level of care: [x]Step Down / []Med-Surg  Bed Status: [x]Inpatient / []Observation  PT/OT: [x]Yes / []No    DVT Prophylaxis: [x] Lovenox / [] Heparin / [] SCDs / [] Already on Systemic Anticoagulation / [] None       Active Hospital Problems    Diagnosis Date Noted    Seizure-like activity (HCC) [R56.9] 08/06/2024       Electronically signed by ROBINA Falcon CNP on 8/7/2024 at 7:47 AM

## 2024-08-07 NOTE — PROGRESS NOTES
[facility]   Pharmacy Pharmacokinetic Monitoring Service - Vancomycin     Ceferino Sanches Jr is a 78 y.o. male starting on vancomycin therapy for Bloodstream Infection. Pharmacy consulted by ROBINA Wilson CNP for monitoring and adjustment.    Target Concentration: Goal AUC/-600 mg*hr/L    Additional Antimicrobials: Zosyn    Pertinent Laboratory Values:   Wt Readings from Last 1 Encounters:   08/07/24 82.7 kg (182 lb 5.1 oz)     Temp Readings from Last 1 Encounters:   08/07/24 (!) 104.4 °F (40.2 °C) (Rectal)     Estimated Creatinine Clearance: 55 mL/min (based on SCr of 1.1 mg/dL).  Recent Labs     08/06/24 2031 08/07/24  0609   CREATININE 1.2 1.1   BUN 20 19   WBC 9.5 7.6     Pertinent Cultures:  Date Source Results   8/7/24 Blood Pending   MRSA Nasal Swab: was ordered by provider, awaiting results.    Plan:  Dosing recommendations based on Bayesian software  Start vancomycin 2,000 mg IV x 1, followed by vancomycin 1,500 mg IV every 24 hours  Anticipated AUC of 552 and trough concentration of 14.5 at steady state  Renal labs as indicated   Vancomycin concentration not ordered at this time  Pharmacy will continue to monitor patient and adjust therapy as indicated    Thank you for the consult,    Fernanda Vizcaino, PharmD  8/7/2024 1:38 PM

## 2024-08-07 NOTE — PROGRESS NOTES
08/07/24 1006   Encounter Summary   Encounter Overview/Reason Attempted Encounter   Service Provided For Patient   Referral/Consult From Rounding   Support System Family members   Last Encounter  08/07/24   Complexity of Encounter Low   Begin Time 1001   End Time  1006   Total Time Calculated 5 min   Assessment/Intervention/Outcome   Assessment Unable to assess   Intervention Prayer (assurance of)/[place]     During my attempted encounter with the 78  yr old patient, I attempted to visit with the pt on 4A. The patient was having a EKG so I could see the pt at this time. I gave a copy of the Advance Directive to the patient's nurse. I or another  will attempt to visit the patient or the family at another time.

## 2024-08-07 NOTE — PROGRESS NOTES
Pt admitted to  7K15 from ED.     Complaints: Seizure-Like Activity.      IV Kepra infusing into the forearm left, condition patent and no redness. IV site free of s/s of infection or infiltration. Vital signs obtained. Assessment and data collection initiated.     Two nurse skin assessment performed by Reena TERESA and Janette/Nidhi RN. Oriented to room.     Explained patients right to have family, representative or physician notified of their admission.  Patient has Declined for physician to be notified.  Patient has Declined for family/representative to be notified.    The patient is interested in [facility]’s meds to beds program?:  No    Policies and procedures for  explained. All questions answered with no further questions at this time. Fall prevention and safety  discussed with patient.  Bed alarm on. Call light in reach.

## 2024-08-07 NOTE — H&P
Hospitalist History & Physical    Patient:  Ceferino Sanches Jr    Unit/Bed:34/034A  YOB: 1945  MRN: [id number]   Acct: [id number]   PCP: Jihan Lyon MD  Code Status: Prior    Date of Service: Pt seen/examined on 08/06/24 and admitted to Observation with expected LOS less than two midnights due to medical therapy.     Chief Complaint: post-op problem    Assessment/Plan:    Seizure-like activity: Family states he started to have a gaze, talk nonsense and mess with his hands. History of seizure disorder. Follows with neurology as OP. Loading dose of Keppra given in ED.   Prolactin pending  Seizure precautions  Neuro checks Q4H  Ativan PRN for seizure activity  Continue home doses of Aptiom, gabapentin, Keppra  Neurology consult    Surgical site bleeding: S/p suprapubic catheter due to urinary retention 8/6/24. Reports bleeding from penis and going down his leg. CT A/P notes suprapubic catheter in place, no cutaneous or subcutaneous edema along the catheter tract, no abscess or hematoma, no obstructive or acute inflammatory changes in GI or  tracts, non-obstructing bilateral punctate nephrolithiasis, cholelithiasis.   Monitor site for further bleeding, apply dry dressing as needed  UA notes hematuria, hyaline casts  IVF overnight  Continue Flomax  Monitor output  CBC in AM    Normocytic anemia, chronic: Hgb 12 and at baseline.   Transfuse if Hgb < 7 or hemodynamically unstable  CBC in AM    CKD stage II: Cr 1.2 and at baseline.   Avoid nephrotoxic agents  Pharmacy to renally dose medications    Essential HTN:   Continue amlodipine, lisinopril    Mixed HLD:   Continue pravastatin    WILLIAM/Dementia/Insomnia: Patients daughters worried about patients confusion and patient possibly pulling on suprapubic catheter.   Continue melatonin, mirtazapine  Virtual  at bedside    Physical deconditioning:  Fall precautions  PT/OT to eval and treat   consult for possible

## 2024-08-07 NOTE — PROGRESS NOTES
[facility]   PROGRESS NOTE      Patient: Ceferino Sanches Jr  Room #: 7K-15/015-A            YOB: 1945  Age: 78 y.o.  Gender: male            Admit Date & Time: 8/6/2024  6:35 PM    Assessment:    The patient was attempted to be visited and is currently in active care for a rapid response. The care is such to inhibit a spiritual care visit at this time.     Interventions:  A the patient was unable to be visited at this time. A prayer was provided outside the patient's room.     Outcomes:  The patient continue to be followed by spiritual care.     Plan:  1.Spiritual care will continue to follow the patient according to [facility] spiritual care SOP.       Electronically signed by Chaplain Mario Alberto, on 8/7/2024 at 7:33 AM.  Spiritual Care Department  [facility]  [phone]     08/07/24 0731   Encounter Summary   Encounter Overview/Reason Crisis   Service Provided For Patient not available   Referral/Consult From Nurse   Support System Unknown   Last Encounter  08/07/24   Complexity of Encounter Low   Begin Time 0715   End Time  0720   Total Time Calculated 5 min   Crisis   Type Rapid Response   Assessment/Intervention/Outcome   Assessment Unable to assess   Intervention Prayer (assurance of)/[place]   Outcome Other (comment)  (in care)

## 2024-08-07 NOTE — PROGRESS NOTES
[facility]  PHYSICAL THERAPY MISSED TREATMENT NOTE  [facility]A    Date: 2024  Patient Name: Ceferino Sanches Jr        MRN: [id number]   : 1945  (78 y.o.)  Gender: male                REASON FOR MISSED TREATMENT:  PT orders received; however, patient was a rapid response on  for seizure like activity and was transferred from  to . Will plan to HOLD at this time. Pt now with neuro consult.      LINDA AguilarT

## 2024-08-07 NOTE — PROCEDURES
PROCEDURE NOTE  Date: 8/7/2024   Name: Ceferino Sanches Jr  YOB: 1945    Procedures            Date: 8/7/2024  Referring physician: Dr. Keenan    Indication  Patient aged 78 y with seizures. EEG done to assess for epileptiform activity.    Introduction  This routine 20-minute EEG was recorded using the International 10-20 System on a Hydra Dx workstation at 256 samples/s. Automated spike and seizure detection algorithms were applied.    Description  During the maximal alert state, a well-regulated, symmetric, and reactive 9 Hz posterior dominant rhythm was seen. No consistent focal slowing or interhemispheric asymmetry was noted. Stage I and stage II sleep were observed. There were right temporal sharps (T4).        Activations  Hyperventilation was not performed. Intermittent photic stimulation was performed and demonstrated no posterior driving response.    Impression  Abnormal awake EEG. The right temporal sharps increases patient risk for focal seizures.     Toi Kolb MD  Epilepsy Board Certified.  Neurology Board Certified.    Electronically Signed

## 2024-08-07 NOTE — SIGNIFICANT EVENT
Call received from Tele-sitter due to change in patient's condition. Per telesitter: \"patient stares w/o blinking, starts pulling on gown, sheet. C asks pt. if he's okay, Pt. doesn't respond but talks nonsensical things as he continues to pull on sheets.\"    RN responded. Patient found to be staring off, disoriented x3 only oriented to self, and messing  with hands. These are seizure like symptoms for patient. RN left to obtain ativan.   0346: Darcy Perdue NP notified.    0350: Ativan administered. See MAR.     0403: Patient continuing to have his \"seizure-like\" activity. Darcy Perdue NP notified. Stated: \"I think it’s more his dementia and some sun downing just keep monitoring \"

## 2024-08-07 NOTE — CARE COORDINATION
8/7/24, 3:06 PM EDT    DISCHARGE PLANNING EVALUATION  Spoke with Brina andre [facility]. Unsure if patient will be able to return to the AL at discharge.  They will call for updates tomorrow.

## 2024-08-07 NOTE — CONSULTS
Provider Consult Note        Patient:   Ceferino Sanches Jr  YOB: 1945  Age:  78 y.o.  Room:  [facility]12/012-  MRN:  [id number]   Acct: [id number]  PCP: Jihan Lyon MD    Date of Admission:  8/6/2024  6:35 PM  Date of Service:  8/7/2024    Reason for Consult: Goals of Care             Subjective   Chief Complaint:-   Post-op Problem       History Obtained From:-  Patient, Family member(s) - Sister    History of Present Illness:-                   Ceferino Sanches Jr is a 78 y.o. male who  has a past medical history of BRENNA (acute kidney injury) (HCC), Anxiety disorder, Cancer (HCC), Cardiac murmur, Constipation, COPD (chronic obstructive pulmonary disease) (HCC), Dementia associated with other underlying disease without behavioral disturbance (HCC), Diarrhea, Epilepsy (HCC), Head injury, HLD (hyperlipidemia), Hyperkalemia, Hypertension, Insomnia, Malnutrition (HCC), Mood disturbance, Seizure disorder ([facility]), Tinea pedis, and Urinary retention. They present to the hospital with Post-op Problem   and are admitted for Seizure-like activity ([facility]). Patient was admitted  for seizure-like activity on 8/6. Rapid response was called on 8/7 AM for AMS, possible seizure. Patient started to become more alert and oriented while on the way to the CT scanner this morning. Patient was transferred to stepdown. Was noted to have a temperature of 104.4 this afternoon. Patient was drowsy during discussion. Oriented to person, place, time, situation. Attempted to discuss code status however patient drowsy. Discussed with patient's sister, she state she has healthcare POA and living will documents at home - she will bring them in to the hospital. Palliative care was consulted for Goals of Care.    Symptoms:  Pain: Reports intermittent burning pain around suprapubic catheter site.  Shortness of breath: Patient denies shortness of breath or difficulty breathing.  Sleep: They are not having  his code status wishes and patient is too drowsy on exam.  Continue current plan of care  Continue current plan of care for chronic diseases per primary and specialist teams  Palliative Care will continue to have goals of care discussions with the patient and/or family  Palliative Care will continue to follow the patient while they are hospitalized   Please PerfectServe or call the Palliative Care team with any questions or concerns    CURRENT CODE STATUS:  Full Code          Parts of this note may have been dictated by use of voice recognition software and electronically transcribed. The note may contain errors not detected in proofreading    Electronically signed by Angel Bueno MD  PGY-3 Internal Medicine Resident on 8/7/2024 at 2:32 PM           Palliative Care Office: [phone]

## 2024-08-07 NOTE — PROGRESS NOTES
[phone]  Tech called this nurse into pt room.  Pt upper extremities shaking.. pt able to verbalize okay at 932 but then proceeded to say gibberish   936 upper extremities shaking again but pt able to talk through out but not making much sense.   941 tremors continue BUE

## 2024-08-07 NOTE — ED NOTES
ED to inpatient nurses report      Chief Complaint:  Chief Complaint   Patient presents with    Post-op Problem     Present to ED from: [facility]     MOA:     LOC: alert and orientated to name, place, date  Mobility: Requires assistance * 2  Oxygen Baseline: Room Air     Current needs required: none     Code Status:   Full Code    What abnormal results were found and what did you give/do to treat them? Syncopal episode/Seizure at discharge - admission   Any procedures or intervention occur? Admission for symptoms at family request     Mental Status:  Level of Consciousness: Alert (0)    Psych Assessment:        Vitals:  Patient Vitals for the past 24 hrs:   BP Temp Temp src Pulse Resp SpO2 Height Weight   08/06/24 1842 (!) 147/63 97.9 °F (36.6 °C) Oral 68 16 96 % 1.753 m (5' 9\") 88.5 kg (195 lb)        LDAs:   Peripheral IV 08/06/24 Left Antecubital (Active)   Site Assessment Clean, dry & intact 08/06/24 2047   Line Status Blood return noted;Flushed 08/06/24 2047   Infiltration Assessment 0 08/06/24 2047   Dressing Status New dressing applied;Clean, dry & intact 08/06/24 2047   Dressing Type Transparent 08/06/24 2047   Dressing Intervention New 08/06/24 2047       Ambulatory Status:  No data recorded    Diagnosis:  DISPOSITION Admitted 08/06/2024 11:24:11 PM   Final diagnoses:   Bleeding   Seizure disorder (HCC)   Dementia without behavioral disturbance (HCC)        Consults:  STR ED TO IP CONSULT  IP CONSULT TO NEUROLOGY  IP CONSULT TO SOCIAL WORK     Pain Score:  Pain Assessment  Pain Assessment: 0-10  Pain Level: 4  Pain Location: Abdomen  Pain Type: Acute pain    C-SSRS:        Sepsis Screening:       Eliza Fall Risk:       Swallow Screening        Preferred Language:   English      ALLERGIES     Patient has no known allergies.    SURGICAL HISTORY       Past Surgical History:   Procedure Laterality Date    CATARACT REMOVAL      HEMORRHOID SURGERY         PAST MEDICAL HISTORY       Past Medical History:    Diagnosis Date    Anxiety disorder     Cancer (HCC)     Squamous Cell Cancer    COPD (chronic obstructive pulmonary disease) (HCC)     HLD (hyperlipidemia)     Seizure disorder (HCC)            Electronically signed by Satish Moody RN on 8/6/2024 at 11:57 PM

## 2024-08-07 NOTE — SIGNIFICANT EVENT
PATIENT NAME: FILI SAINI JR   : 1945  DOS: 2024    Resident physician responding to rapid response at 0711 in 7K15. Per chart review, this is a 78 year old male with a PMHx of urinary retention, unspecified seizure disorder, COPD, HLD who presented to [facility] on 2024 after a suprapubic catheter placement with bleeding from suprapubic catheter site. While in the emergency department, the patient was noted to have seizure-like activity per family, described as to \"have a gaze, talk nonsense and mess with his hands.\" The patient was started on Keppra in the ER and admitted for further evaluation and management. After admission to the floor with telesitter monitoring, the telesitter noted the \"patient stares w/o blinking, starts pulling on gown, sheet.\" The patient was noted to respond nonsensically to the Tele-sitter. The RN confirmed that the patient was \"staring off\" and disoriented and \"messing with hands.\"  Admitting NP notified and and Ativan administered at 0350.     The patient was found to be notably lethargic at 0708 with shaking in the upper extremities. Rapid response was called.  Upon arrival, the house supervisor, attending provider, and Resource RN were present at bedside. Patient does not respond to questioning appropriately. He is oriented only to self. He is unable to participate in review of systems.     Initial vital signs within normal limits. POC Glucose 112.   Morning Labs:  Sodium 141, Potassium 4.3, Chloride 108, Bicarb 20, BUN 19, Cr 1.1, AG 13, EGFR 68, Magnesium 2.1, Glucose 101, Calcium 8.4, Calc Osm 283.7, Total Protein 6.7    Albumin 4.1, Alk Phos 139, ALT 11, AST 15, Total Bilirubin 0.3, Direct Bilirubin <0.1, Total Protein 6.7    Prolactin 7.6    WBC 7.6, Hb 11, Hct 32.4, MCV 97, Plt 189    UA showing Specific Gravity >1.030, Large Blood, 30 Protein, Small leukocyte esterase, >15 casts, WBC 25-50, RBC >200, 5-10, no bacteria    2024 CT Abdomen Pelvis W IV

## 2024-08-07 NOTE — ED PROVIDER NOTES
[facility] EMERGENCY DEPT  EMERGENCY DEPARTMENT ENCOUNTER          Pt Name: Ceferino Sanches Jr  MRN: [id number]  Birthdate 1945  Date of evaluation: 8/6/2024  Physician: Madeleine Laurent MD  Supervising Attending Physician: Fabricio Rainey DO       CHIEF COMPLAINT       Chief Complaint   Patient presents with    Post-op Problem         HISTORY OF PRESENT ILLNESS    HPI  Ceferino Sanches Jr is a 78 y.o. male who presents to the emergency department from home, by private vehicle for evaluation of postop problem    Patient with history of urinary retention suprapubic catheter placed today.  Was discharged home feeling okay however when he was sitting down he felt liquids dripping down his stomach.  When he looked it was yash blood.  He denied any urine mixed in with the blood.  He has not had any abdominal pain.  He is not having any lightheadedness.  He has not noted bleeding from any other site.  The patient has no other acute complaints at this time.      REVIEW OF SYSTEMS   Review of Systems      PAST MEDICAL AND SURGICAL HISTORY     Past Medical History:   Diagnosis Date    Anxiety disorder     Cancer (HCC)     Squamous Cell Cancer    COPD (chronic obstructive pulmonary disease) (HCC)     HLD (hyperlipidemia)     Seizure disorder (HCC)      Past Surgical History:   Procedure Laterality Date    CATARACT REMOVAL      HEMORRHOID SURGERY           MEDICATIONS   No current facility-administered medications for this encounter.    Current Outpatient Medications:     lisinopril (PRINIVIL;ZESTRIL) 10 MG tablet, Take 1 tablet by mouth daily, Disp: 30 tablet, Rfl: 3    amLODIPine (NORVASC) 10 MG tablet, Take 1 tablet by mouth daily, Disp: 30 tablet, Rfl: 3    senna (SENOKOT) 8.6 MG tablet, Take 1 tablet by mouth nightly, Disp: 30 tablet, Rfl: 1    polyethylene glycol (GLYCOLAX) 17 g packet, Take 1 packet by mouth daily, Disp: 30 packet, Rfl: 1    eslicarbazepine acetate (APTIOM) 800 MG tablet, Take 800 mg by  are moist.      Pharynx: No oropharyngeal exudate or posterior oropharyngeal erythema.   Eyes:      Extraocular Movements: Extraocular movements intact.      Conjunctiva/sclera: Conjunctivae normal.      Pupils: Pupils are equal, round, and reactive to light.   Cardiovascular:      Rate and Rhythm: Normal rate and regular rhythm.      Pulses: Normal pulses.      Heart sounds: No murmur heard.  Pulmonary:      Effort: Pulmonary effort is normal.      Breath sounds: Normal breath sounds.   Abdominal:      General: Abdomen is flat.      Palpations: Abdomen is soft.      Comments: Mild tenderness noted on exam likely related to surgical incision  No active bleeding noted.  No active bleeding from penis noted   Musculoskeletal:      Right lower leg: No edema.      Left lower leg: No edema.   Skin:     General: Skin is warm and dry.      Capillary Refill: Capillary refill takes less than 2 seconds.   Neurological:      General: No focal deficit present.      Mental Status: He is alert and oriented to person, place, and time. Mental status is at baseline.      Cranial Nerves: No cranial nerve deficit.      Motor: No weakness.         ED RESULTS   Laboratory results (none if blank):  Labs Reviewed   BASIC METABOLIC PANEL   CBC WITH AUTO DIFFERENTIAL     All laboratory results are individually reviewed and interpreted by me in the clinical context of this patient.  See ED course below for results interpretation if applicable.  (A negative COVID-19 test should be interpreted as COVID no longer suspected unless otherwise noted in this encounter documentation note)  (Any cultures that may have been sent were not resulted at the time of this patient ED visit)      Radiologic studies results available at the moment of this note (None if blank):  CT ABDOMEN PELVIS W IV CONTRAST Additional Contrast? None    (Results Pending)     See ED course below for my interpretation if applicable.  All radiology images independently reviewed by

## 2024-08-07 NOTE — DISCHARGE INSTRUCTIONS
Follow-up with your urologist for further assessment and management of your symptoms.    In case the bleeding recurs apply pressure from the bleeding site for 5 to 10 minutes.  If the bleeding is not stopping return to the ED.    Return to the emergency department immediately if there is any new or concerning symptom.

## 2024-08-07 NOTE — ED PROVIDER NOTES
I performed a history and physical examination of the patient and discussed management with the resident. I reviewed the resident’s note and agree with the documented findings and plan of care. Any areas of disagreement are noted on the chart. I was personally present for the key portions of any procedures. I have documented in the chart those procedures where I was not present during the key portions. I have reviewed the emergency nurses triage note. I agree with the chief complaint, past medical history, past surgical history, allergies, medications, social and family history as documented unless otherwise noted below. Documentation of the HPI, Physical Exam and Medical Decision Making performed by medical students or scribes is based on my personal performance of the HPI, PE and MDM. For Phys Assistant/ Nurse Practitioner cases/documentation I have personally evaluated this patient and have completed at least one if not all key elements of the E/M (history, physical exam, and MDM). My findings are as noted below.    In other words, I personally saw and examined the patient I have reviewed and agreed with the resident findings including all diagnostic interpretations and treatment plans as written.  I was present for the key portion of any procedures performed and the inclusive time noted in any critical care statement.    Patient presents today for complaints of blood which she thought may have been coming out of his penis but may be coming from his freshly placed suprapubic catheter.  Patient had a history of urinary retention.  He states that he noticed that he had some blood going down his leg.  He reported that there was some bleeding from his penis.  There does appear to be blood in the suprapubic catheter.  Patient is not noting any extreme abdominal pain.  Patient is not complaining of pain at all.  Denies any fevers chills sweats.  It appears that the suprapubic catheter is draining.  This may be simple

## 2024-08-07 NOTE — PROGRESS NOTES
[facility]  OCCUPATIONAL THERAPY MISSED TREATMENT NOTE  [facility]  7K15      Date: 2024  Patient Name: Ceferino Sanches Jr        CSN: [id number]   : 1945  (78 y.o.)  Gender: male                REASON FOR MISSED TREATMENT:  Pt was a rapid response this AM for seizures and transferred to . Pt now has pending neuro consult. OT will hold this date.

## 2024-08-07 NOTE — CONSULTS
Neurology Consult Note    Date:8/7/2024       Room:[facility]15/015-A  Patient Name:Ceferino Sanches Jr     YOB: 1945     Age:78 y.o.    Requesting Physician: Elisabeth Keenan APRN*     Reason for Consult:  Evaluate for seizure like activity      Chief Complaint:   Chief Complaint   Patient presents with    Post-op Problem       Subjective     Ceferino Sanches Jr is a 78 y.o. male with a history of seizures on Keppra 1250mg BID and Aptiom 1000mg, anxiety, COPD, dementia, hyperlipidemia, hypertension, mood disorder, urinary retention who presented to [facility] ED 8/6 for the evaluation of bleeding post suprapubic catheter placement yesterday. Patient and sister state patient did fine following the procedure, but patient was sitting down when he felt something dripping down his stomach and leg. When he looked it appeared to look like blood. CT abdomen and pelvis obtained while in the ED and revealed no cutaneous or subcutaneous edema along the catheter tract as well as no abscess or hematoma. Patient was ultimately going to be discharged when he started to have a seizure. Patient's sister states that he began to have a blank stare, started smacking his lips, and started to pick at things. She states this is typical of patient's seizures. Patient was given 1g of Keppra and admitted to the hospital. Patient's nursing home was contacted to determine if patient received his evening medications and patient did not receive his evening medications secondary to coming to the hospital. Patient started having seizures when he was roughly 9 years old following a head injury. Patient's sister states his last seizure was roughly a year ago. Patient does follow with an epilepsy specialist in [place] and last saw them in April of this year. Patient was noted to exhibit seizure like activity again early this morning to which he received 4mg of Ativan at 0350 this morning. Patient was a rapid response this morning due to being

## 2024-08-08 PROBLEM — E78.5 HYPERLIPIDEMIA: Status: ACTIVE | Noted: 2024-08-08

## 2024-08-08 LAB
ANION GAP SERPL CALC-SCNC: 14 MEQ/L (ref 8–16)
BASOPHILS ABSOLUTE: 0 THOU/MM3 (ref 0–0.1)
BASOPHILS ABSOLUTE: 0.1 THOU/MM3 (ref 0–0.1)
BASOPHILS NFR BLD AUTO: 0.2 %
BASOPHILS NFR BLD AUTO: 0.2 %
BUN SERPL-MCNC: 26 MG/DL (ref 7–22)
CALCIUM SERPL-MCNC: 8.1 MG/DL (ref 8.5–10.5)
CHLORIDE SERPL-SCNC: 109 MEQ/L (ref 98–111)
CO2 SERPL-SCNC: 18 MEQ/L (ref 23–33)
CREAT SERPL-MCNC: 1.6 MG/DL (ref 0.4–1.2)
DEPRECATED RDW RBC AUTO: 49.1 FL (ref 35–45)
DEPRECATED RDW RBC AUTO: 50.4 FL (ref 35–45)
EKG Q-T INTERVAL: 330 MS
EKG QRS DURATION: 78 MS
EKG QTC CALCULATION (BAZETT): 518 MS
EKG R AXIS: 0 DEGREES
EKG T AXIS: 83 DEGREES
EKG VENTRICULAR RATE: 148 BPM
EOSINOPHIL NFR BLD AUTO: 0 %
EOSINOPHIL NFR BLD AUTO: 0 %
EOSINOPHILS ABSOLUTE: 0 THOU/MM3 (ref 0–0.4)
EOSINOPHILS ABSOLUTE: 0 THOU/MM3 (ref 0–0.4)
ERYTHROCYTE [DISTWIDTH] IN BLOOD BY AUTOMATED COUNT: 13.4 % (ref 11.5–14.5)
ERYTHROCYTE [DISTWIDTH] IN BLOOD BY AUTOMATED COUNT: 13.8 % (ref 11.5–14.5)
GFR SERPL CREATININE-BSD FRML MDRD: 44 ML/MIN/1.73M2
GLUCOSE SERPL-MCNC: 97 MG/DL (ref 70–108)
HCT VFR BLD AUTO: 30 % (ref 42–52)
HCT VFR BLD AUTO: 35.7 % (ref 42–52)
HGB BLD-MCNC: 11.9 GM/DL (ref 14–18)
HGB BLD-MCNC: 9.8 GM/DL (ref 14–18)
IMM GRANULOCYTES # BLD AUTO: 0.32 THOU/MM3 (ref 0–0.07)
IMM GRANULOCYTES # BLD AUTO: 1.93 THOU/MM3 (ref 0–0.07)
IMM GRANULOCYTES NFR BLD AUTO: 1.3 %
IMM GRANULOCYTES NFR BLD AUTO: 7.8 %
LACTATE SERPL-SCNC: 1.7 MMOL/L (ref 0.5–2)
LACTATE SERPL-SCNC: 2.1 MMOL/L (ref 0.5–2)
LYMPHOCYTES ABSOLUTE: 1.4 THOU/MM3 (ref 1–4.8)
LYMPHOCYTES ABSOLUTE: 1.8 THOU/MM3 (ref 1–4.8)
LYMPHOCYTES NFR BLD AUTO: 5.4 %
LYMPHOCYTES NFR BLD AUTO: 7.1 %
MCH RBC QN AUTO: 32.8 PG (ref 26–33)
MCH RBC QN AUTO: 33.1 PG (ref 26–33)
MCHC RBC AUTO-ENTMCNC: 32.7 GM/DL (ref 32.2–35.5)
MCHC RBC AUTO-ENTMCNC: 33.3 GM/DL (ref 32.2–35.5)
MCV RBC AUTO: 100.3 FL (ref 80–94)
MCV RBC AUTO: 99.4 FL (ref 80–94)
MONOCYTES ABSOLUTE: 2 THOU/MM3 (ref 0.4–1.3)
MONOCYTES ABSOLUTE: 2.1 THOU/MM3 (ref 0.4–1.3)
MONOCYTES NFR BLD AUTO: 8 %
MONOCYTES NFR BLD AUTO: 8.7 %
MRSA DNA SPEC QL NAA+PROBE: NEGATIVE
NEUTROPHILS ABSOLUTE: 18.8 THOU/MM3 (ref 1.8–7.7)
NEUTROPHILS ABSOLUTE: 21.4 THOU/MM3 (ref 1.8–7.7)
NEUTROPHILS NFR BLD AUTO: 76.2 %
NEUTROPHILS NFR BLD AUTO: 85.1 %
NRBC BLD AUTO-RTO: 0 /100 WBC
NRBC BLD AUTO-RTO: 0 /100 WBC
PLATELET # BLD AUTO: 159 THOU/MM3 (ref 130–400)
PLATELET # BLD AUTO: 162 THOU/MM3 (ref 130–400)
PLATELET BLD QL SMEAR: ADEQUATE
PLATELET BLD QL SMEAR: ADEQUATE
PMV BLD AUTO: 10.1 FL (ref 9.4–12.4)
PMV BLD AUTO: 10.3 FL (ref 9.4–12.4)
POTASSIUM SERPL-SCNC: 4.6 MEQ/L (ref 3.5–5.2)
RBC # BLD AUTO: 2.99 MILL/MM3 (ref 4.7–6.1)
RBC # BLD AUTO: 3.59 MILL/MM3 (ref 4.7–6.1)
SCAN OF BLOOD SMEAR: NORMAL
SCAN OF BLOOD SMEAR: NORMAL
SODIUM SERPL-SCNC: 141 MEQ/L (ref 135–145)
WBC # BLD AUTO: 24.7 THOU/MM3 (ref 4.8–10.8)
WBC # BLD AUTO: 25.2 THOU/MM3 (ref 4.8–10.8)

## 2024-08-08 PROCEDURE — 96372 THER/PROPH/DIAG INJ SC/IM: CPT

## 2024-08-08 PROCEDURE — 97530 THERAPEUTIC ACTIVITIES: CPT

## 2024-08-08 PROCEDURE — 97535 SELF CARE MNGMENT TRAINING: CPT

## 2024-08-08 PROCEDURE — 99232 SBSQ HOSP IP/OBS MODERATE 35: CPT | Performed by: NURSE PRACTITIONER

## 2024-08-08 PROCEDURE — 96368 THER/DIAG CONCURRENT INF: CPT

## 2024-08-08 PROCEDURE — 96376 TX/PRO/DX INJ SAME DRUG ADON: CPT

## 2024-08-08 PROCEDURE — 93010 ELECTROCARDIOGRAM REPORT: CPT | Performed by: INTERNAL MEDICINE

## 2024-08-08 PROCEDURE — 2580000003 HC RX 258

## 2024-08-08 PROCEDURE — 2060000000 HC ICU INTERMEDIATE R&B

## 2024-08-08 PROCEDURE — 6360000002 HC RX W HCPCS: Performed by: NURSE PRACTITIONER

## 2024-08-08 PROCEDURE — 6370000000 HC RX 637 (ALT 250 FOR IP): Performed by: NURSE PRACTITIONER

## 2024-08-08 PROCEDURE — 83605 ASSAY OF LACTIC ACID: CPT

## 2024-08-08 PROCEDURE — 6360000002 HC RX W HCPCS

## 2024-08-08 PROCEDURE — 87040 BLOOD CULTURE FOR BACTERIA: CPT

## 2024-08-08 PROCEDURE — 6370000000 HC RX 637 (ALT 250 FOR IP)

## 2024-08-08 PROCEDURE — 96361 HYDRATE IV INFUSION ADD-ON: CPT

## 2024-08-08 PROCEDURE — 80048 BASIC METABOLIC PNL TOTAL CA: CPT

## 2024-08-08 PROCEDURE — 85025 COMPLETE CBC W/AUTO DIFF WBC: CPT

## 2024-08-08 PROCEDURE — 96366 THER/PROPH/DIAG IV INF ADDON: CPT

## 2024-08-08 PROCEDURE — 97166 OT EVAL MOD COMPLEX 45 MIN: CPT

## 2024-08-08 PROCEDURE — 36415 COLL VENOUS BLD VENIPUNCTURE: CPT

## 2024-08-08 PROCEDURE — 2580000003 HC RX 258: Performed by: NURSE PRACTITIONER

## 2024-08-08 PROCEDURE — 99221 1ST HOSP IP/OBS SF/LOW 40: CPT | Performed by: UROLOGY

## 2024-08-08 RX ORDER — 0.9 % SODIUM CHLORIDE 0.9 %
500 INTRAVENOUS SOLUTION INTRAVENOUS ONCE
Status: COMPLETED | OUTPATIENT
Start: 2024-08-08 | End: 2024-08-08

## 2024-08-08 RX ORDER — KETOROLAC TROMETHAMINE 30 MG/ML
15 INJECTION, SOLUTION INTRAMUSCULAR; INTRAVENOUS ONCE
Status: COMPLETED | OUTPATIENT
Start: 2024-08-08 | End: 2024-08-08

## 2024-08-08 RX ADMIN — LISINOPRIL 10 MG: 10 TABLET ORAL at 10:17

## 2024-08-08 RX ADMIN — MIRTAZAPINE 15 MG: 15 TABLET, FILM COATED ORAL at 21:08

## 2024-08-08 RX ADMIN — AMLODIPINE BESYLATE 10 MG: 10 TABLET ORAL at 10:17

## 2024-08-08 RX ADMIN — Medication 1500 MG: at 14:29

## 2024-08-08 RX ADMIN — SODIUM CHLORIDE: 9 INJECTION, SOLUTION INTRAVENOUS at 06:14

## 2024-08-08 RX ADMIN — PIPERACILLIN AND TAZOBACTAM 3375 MG: 3; .375 INJECTION, POWDER, FOR SOLUTION INTRAVENOUS at 13:07

## 2024-08-08 RX ADMIN — SODIUM CHLORIDE 500 ML: 9 INJECTION, SOLUTION INTRAVENOUS at 00:17

## 2024-08-08 RX ADMIN — OXCARBAZEPINE 450 MG: 300 TABLET, FILM COATED ORAL at 21:07

## 2024-08-08 RX ADMIN — SODIUM CHLORIDE, PRESERVATIVE FREE 10 ML: 5 INJECTION INTRAVENOUS at 10:18

## 2024-08-08 RX ADMIN — ENOXAPARIN SODIUM 40 MG: 100 INJECTION SUBCUTANEOUS at 10:18

## 2024-08-08 RX ADMIN — PIPERACILLIN AND TAZOBACTAM 3375 MG: 3; .375 INJECTION, POWDER, FOR SOLUTION INTRAVENOUS at 03:46

## 2024-08-08 RX ADMIN — ACETAMINOPHEN 650 MG: 325 TABLET ORAL at 14:32

## 2024-08-08 RX ADMIN — GABAPENTIN 300 MG: 300 CAPSULE ORAL at 10:17

## 2024-08-08 RX ADMIN — LEVETIRACETAM 1250 MG: 500 TABLET, FILM COATED ORAL at 21:07

## 2024-08-08 RX ADMIN — SODIUM CHLORIDE, PRESERVATIVE FREE 10 ML: 5 INJECTION INTRAVENOUS at 10:17

## 2024-08-08 RX ADMIN — TAMSULOSIN HYDROCHLORIDE 0.4 MG: 0.4 CAPSULE ORAL at 10:16

## 2024-08-08 RX ADMIN — ACETAMINOPHEN 650 MG: 325 TABLET ORAL at 21:04

## 2024-08-08 RX ADMIN — ACETAMINOPHEN 650 MG: 650 SUPPOSITORY RECTAL at 03:25

## 2024-08-08 RX ADMIN — LEVETIRACETAM 1250 MG: 500 TABLET, FILM COATED ORAL at 10:16

## 2024-08-08 RX ADMIN — OXCARBAZEPINE 450 MG: 300 TABLET, FILM COATED ORAL at 10:17

## 2024-08-08 RX ADMIN — PRAVASTATIN SODIUM 40 MG: 40 TABLET ORAL at 10:17

## 2024-08-08 RX ADMIN — POLYVINYL ALCOHOL 1 DROP: 1.4 SOLUTION/ DROPS OPHTHALMIC at 21:09

## 2024-08-08 RX ADMIN — POLYVINYL ALCOHOL 1 DROP: 1.4 SOLUTION/ DROPS OPHTHALMIC at 10:16

## 2024-08-08 RX ADMIN — SODIUM CHLORIDE, PRESERVATIVE FREE 10 ML: 5 INJECTION INTRAVENOUS at 21:16

## 2024-08-08 RX ADMIN — KETOROLAC TROMETHAMINE 15 MG: 30 INJECTION, SOLUTION INTRAMUSCULAR at 02:40

## 2024-08-08 RX ADMIN — Medication 10.5 MG: at 21:07

## 2024-08-08 RX ADMIN — PIPERACILLIN AND TAZOBACTAM 3375 MG: 3; .375 INJECTION, POWDER, FOR SOLUTION INTRAVENOUS at 21:13

## 2024-08-08 NOTE — PALLIATIVE CARE
Follow Up / Progress Note        Patient:   Ceferino Saini Jr  YOB: 1945  Age:  78 y.o.  Room:  [address] [place] [address]  MRN:  [id number]         Advance Care Planning     Palliative Team Advance Care Planning (ACP) Conversation    Date of Conversation: 24    Individuals present for the conversation: Patient with decision making capacity and Sister Daina     ACP documents on file prior to discussion:  -None    Previously completed document/s not on file: Received a copy of the patient's medical POA from [place].  Medical POA indicates that patient's mother, Niesha Saini is primary POA but she has .  1st alternate medical POA is Daina Saini, 2nd alternate is Pratik Saini and 3rd alternate is Leland Saini.  Copy faxed to medical records and copy placed in paper chart and original copy returned to [place].  Daina indicates that she believes that the patient has a living will but she could not find a copy and she has contacted the  in an attempt to provide a copy to the hospital.    Healthcare Decision Maker:    Primary Decision Maker: DAINA SAINI - Brother/Sister - [phone]     Conversation Summary:  Patient resting in bed.  Patient is alert and oriented to all spheres.  Patient's sister, Daina is at the bedside.  Palliative care introduced.  Copy of medical POA received at this time (see note above).  Patient denies any c/o discomfort at this time.  Discussion about code status levels and what each level entails.  Discussed complications of rib fractures, brain and organ damage associated with resuscitative measures.  Patient and sister verbalize understanding that the patient is a full code and if a change is desired, staff must be informed.  [place] DNR form discussed and provided to patient and sister for further review and discussion.  Emotional support provided.    Resuscitation Status: Full     Documentation Completed:  -Other Medical POA paperwork  obtained and placed in paper chart and faxed to medical records.  Sister is making attempts to locate the patient's living will to provide a copy.    I spent 10 minutes with the patient and/or surrogate decision maker discussing the patient's wishes and goals.      Sheryl Nova RN             Electronically signed by Sheryl Nova RN on 8/8/2024 at 1:11 PM             Palliative Care Office: [phone]

## 2024-08-08 NOTE — CARE COORDINATION
8/8/24, 2:23 PM EDT    DISCHARGE ON GOING EVALUATION    Ceferino Sanches [facility] day: 0  Location: -12/012-A Reason for admit: Bleeding [R58]  Seizure disorder (HCC) [G40.909]  Seizure-like activity (HCC) [R56.9]  Dementia without behavioral disturbance (HCC) [F03.90]     Procedures: none    Imaging since last note:   8/7 KUB: Nonobstructive bowel gas pattern.       Barriers to Discharge: Presented from assisted living with concerns post suprapubic catheter placement. Transferred to  on 8/7  after rapid response for seizure like activity.   Continues of zosyn and vanc. TMAX 104.4. Cooling blanket. UA pending. Urology eval complete, plans to leave suprapubic in place.     PCP: Jihan Lyon MD  Readmission Risk Score: 22.3    Patient Goals/Plan/Treatment Preferences: From [facility] Unsure if skilled will be needed. Spoke with [facility] via email earlier today re: IP status. Their physician plans to review.

## 2024-08-08 NOTE — PROCEDURES
PROCEDURE NOTE  Date: 8/7/2024   Name: Ceferino ABERNATHY Myron Zaidi  YOB: 1945    Procedures    12 lead EKG completed. Results handed to RN

## 2024-08-08 NOTE — CARE COORDINATION
8/8/24, 3:04 PM EDT  Discharge Planning Evaluation  Social work consult received, patient from  [facility].   [facility]    Would patient be willing to go to a skilled facility if needed: Yes.  Is there skilled care available at current facility: Yes.  Barriers to return to current living situation:  Mentation and ability to ambulate   Spoke with Brina at the facility.  Patient bed hold: Yes  Anticipated transport plan: Ambulance v. ambulette  Patient's Healthcare Decision Maker: Legal Next of Kin- Sister Daina       08/08/24 1500   Service Assessment   Patient Orientation Unable to Assess  (Patient has telesitter, spoke with sister in room)   Cognition Other (see comment)  (Spoke with sister)   History Provided By Child/Family   Primary Caregiver Other (Comment)  (From  [facility])   Support Systems Family Members   Patient's Healthcare Decision Maker is: Legal Next of Kin  (Sister Daina)   PCP Verified by CM Yes   Last Visit to PCP Within last 6 months   Prior Functional Level Assistance with the following:;Bathing;Dressing;Toileting;Housework;Shopping;Mobility   Current Functional Level Assistance with the following:;Bathing;Toileting;Dressing;Cooking;Housework;Shopping;Mobility   Can patient return to prior living arrangement No  (Needs SNF)   Ability to make needs known: Fair   Family able to assist with home care needs: No   Would you like for me to discuss the discharge plan with any other family members/significant others, and if so, who? Yes  (Sister Daina)   Financial Resources Medicare;Medicaid   Community Resources Transportation;ECF/Home Care   Discharge Planning   Type of Residence Assisted living   Living Arrangements Other (Comment)  (From  [facility])   Current Services Prior To Admission Transportation;Durable Medical Equipment;Extended Care Facility   Current DME Prior to Arrival [facility]   Potential Assistance Needed Skilled Nursing

## 2024-08-08 NOTE — CONSULTS
[facility]  [facility]  [address] [address]  Dept: [phone]  Loc: [phone]  Visit Date: 8/6/2024    Urology Consult Note    Reason for Consult:  Had SP cath placed yesterday, now septic, bleeding around site   Requesting Physician:  medicine    History Obtained From:  patient, electronic medical record    Chief Complaint: Postop problem and seizure activity     HISTORY OF PRESENT ILLNESS:                The patient is a 78 y.o. male with significant past medical history of PMHx of Seizure disorder, urinary retention, HTN who presented to [facility] with chief complaint of postop problem. Patient reports he went home after his procedure and he was sitting in his chair when he felt some \"trickling\" coming from his penis down his leg. Reports that he went to the bathroom and noticed blood coming from his penis and down his leg and onto the floor. He reports that he was worried because it seemed like a lot of blood so he called for help and was sent to ED for evaluation. Patient reports that the site is tender otherwise denies any symptoms. Denies headache, dizziness, or lightheadedness. Denies chest pain or SOB. Denies abdominal pain or nausea.      ED was planning to discharge patient when he had what his daughters described as \"a seizure\".  Reports that patient began to gaze, talk nonsense and playing with things with his fingers. Patient does not feel that he had a seizure and was aware of what he was doing. The daughters were also concerned that patient seemed confused and were fearful that he may be more confused during the night and may pull at his new catheter, therefore requested admission. On exam patient is alert and oriented and able to give a great history of the events that led up to his admission.\"     Past Medical History:        Diagnosis Date    BRENNA (acute kidney injury) (HCC)     Anxiety disorder     Cancer (HCC)     Squamous Cell Cancer  08/07/2024 09:35 PM    HGB 11.9 08/07/2024 09:35 PM    HCT 35.7 08/07/2024 09:35 PM    MCV 99.4 08/07/2024 09:35 PM    MCH 33.1 08/07/2024 09:35 PM    MCHC 33.3 08/07/2024 09:35 PM     08/07/2024 09:35 PM    MPV 10.1 08/07/2024 09:35 PM     BMP:    Lab Results   Component Value Date/Time     08/07/2024 06:09 AM    K 4.8 08/07/2024 09:35 PM    K 4.3 08/07/2024 06:09 AM     08/07/2024 06:09 AM    CO2 20 08/07/2024 06:09 AM    BUN 19 08/07/2024 06:09 AM    CREATININE 1.1 08/07/2024 06:09 AM    CALCIUM 8.4 08/07/2024 06:09 AM    LABGLOM 68 08/07/2024 06:09 AM    LABGLOM 73 04/15/2022 07:30 AM    LABGLOM 87 01/24/2022 09:47 AM    GLUCOSE 101 08/07/2024 06:09 AM     BUN/Creatinine:    Lab Results   Component Value Date/Time    BUN 19 08/07/2024 06:09 AM    CREATININE 1.1 08/07/2024 06:09 AM     Magnesium:    Lab Results   Component Value Date/Time    MG 1.9 08/07/2024 09:35 PM     Phosphorus:  No results found for: \"PHOS\"  PT/INR:    Lab Results   Component Value Date/Time    INR 1.04 06/28/2024 01:00 PM     U/A:    Lab Results   Component Value Date/Time    COLORU YELLOW 08/07/2024 04:00 PM    PHUR 5.0 08/07/2024 04:00 PM    LABCAST >15 HYALINE 08/07/2024 02:22 AM    LABCAST NONE SEEN 08/07/2024 02:22 AM    WBCUA > 100 08/07/2024 04:00 PM    RBCUA > 200 08/07/2024 04:00 PM    MUCUS THREADS 06/28/2024 05:34 PM    YEAST NONE SEEN 08/07/2024 04:00 PM    BACTERIA NONE SEEN 08/07/2024 04:00 PM    LEUKOCYTESUR MODERATE 08/07/2024 04:00 PM    UROBILINOGEN 0.2 08/07/2024 04:00 PM    BILIRUBINUR NEGATIVE 08/07/2024 04:00 PM    BLOODU LARGE 08/07/2024 04:00 PM    GLUCOSEU NEGATIVE 08/07/2024 04:00 PM    AMORPHOUS URATES 05/14/2024 02:19 AM       Imaging:   The patient has had a CT Without and With Contrast which I have independently reviewed along with its accompanying report.  The study demonstrates CT ABDOMEN AND PELVIS WITH CONTRAST     Comparison: CT/KO/SR - CT ABDOMEN PELVIS W IV CONTRAST - 05/09/2024 12:49

## 2024-08-08 NOTE — PROGRESS NOTES
Neurology Progress Note    Date:8/8/2024       Room:[address] [place] [address]  Patient Name:Ceferino Sanches Jr     YOB: 1945     Age:78 y.o.    Requesting Physician: Lauren Dsouza APRN - CNP     Reason for Consult:  Evaluate for seizure like activity      Chief Complaint:   Chief Complaint   Patient presents with    Post-op Problem       Subjective     Ceferino Sanches Jr is a 78 y.o. male with a history of seizures on Keppra 1250mg BID and Aptiom 1000mg, anxiety, COPD, dementia, hyperlipidemia, hypertension, mood disorder, urinary retention who presented to [facility] ED 8/6 for the evaluation of bleeding post suprapubic catheter placement yesterday. Patient and sister state patient did fine following the procedure, but patient was sitting down when he felt something dripping down his stomach and leg. When he looked it appeared to look like blood. CT abdomen and pelvis obtained while in the ED and revealed no cutaneous or subcutaneous edema along the catheter tract as well as no abscess or hematoma. Patient was ultimately going to be discharged when he started to have a seizure. Patient's sister states that he began to have a blank stare, started smacking his lips, and started to pick at things. She states this is typical of patient's seizures. Patient was given 1g of Keppra and admitted to the hospital. Patient's nursing home was contacted to determine if patient received his evening medications and patient did not receive his evening medications secondary to coming to the hospital. Patient started having seizures when he was roughly 9 years old following a head injury. Patient's sister states his last seizure was roughly a year ago. Patient does follow with an epilepsy specialist in [place] and last saw them in April of this year. Patient was noted to exhibit seizure like activity again early this morning to which he received 4mg of Ativan at 0350 this morning. Patient was a rapid response this morning due to being  notably lethargic and not responding to questions appropriately which is likely secondary to the Ativan. Patient's mentation was waxing and waning this morning and he was intermittently noted to be picking at things so a STAT EEG was obtained and was negative for any epileptiform discharges, it did reveal right temporal sharps. Patient revisited in the afternoon and noted to have a rectal temperature of 104.4. Heart rate elevated at 145. Further infectious work-up pending.    Interval history 8/8/24:  He is doing much better on exam today. He is alert and oriented x3. He is currently receiving zosyn and vanco for infection. Temperature has been down. Per his sister at his bedside he is much more awake and interactive today.     Review of Systems   Review of Systems   Constitutional:  Positive for fever. Negative for chills.   HENT:  Negative for rhinorrhea and sore throat.    Eyes:  Negative for visual disturbance.   Respiratory:  Negative for cough and shortness of breath.    Cardiovascular:  Negative for chest pain.   Gastrointestinal:  Negative for abdominal pain, nausea and vomiting.   Musculoskeletal:  Negative for arthralgias and myalgias.   Skin:  Negative for rash.   Neurological:  Negative for dizziness, weakness, numbness and headaches.   Psychiatric/Behavioral:  The patient is not nervous/anxious.      Medications   Scheduled Meds:    levETIRAcetam  1,250 mg Oral BID    OXcarbazepine  450 mg Oral BID    piperacillin-tazobactam  3,375 mg IntraVENous Q8H    vancomycin  1,500 mg IntraVENous Q24H    vancomycin (VANCOCIN) intermittent dosing (placeholder)   Other RX Placeholder    sodium chloride flush  5-40 mL IntraVENous 2 times per day    sodium chloride flush  5-40 mL IntraVENous 2 times per day    enoxaparin  40 mg SubCUTAneous Daily    amLODIPine  10 mg Oral Daily    gabapentin  300 mg Oral Daily    polyvinyl alcohol  1 drop Ophthalmic BID    lisinopril  10 mg Oral Daily    melatonin  10.5 mg Oral

## 2024-08-08 NOTE — PROGRESS NOTES
Hospitalist Progress Note    Patient:  Ceferino Sanches       Unit/Bed:4A-12/012-A    YOB: 1945    MRN: [id number]       Acct: [id number]     PCP: Jihan Lyon MD    Date of Admission: 8/6/2024    Assessment/Plan:      1.UTI  Urine culture preliminary report shows no.fermenting gram negative bacilli  Patient on IV Zosyn/IV Vancomycin  Will continue to follow cultures and adjust antibiotics accordingly    2. Seizure-like activity  Per record patient was  given Keppra IV as his home dose is Keppra XR 1250 mg twice daily,  EEG was negative for any epileptiform, CT head was negative for acute pathology  seizure precautions, patient was  Patient was evaluated by neurology recommended to continue his Keppra 1250mg BID and the trileptal 450mg BID.  Continue to follow with epilepsy specialist in [place] upon discharge see notes appreciate input  No seizures reported overnight    3. Sepsis suspected  Patient on IV Zosyn/Vancomycin  Lactic acid back to normal,fever improved  Blood culture in progress will continue to follow    3. Per record patient had Surgical site bleeding  patient had suprapubic catheter placed on 8/6/2024  CT abd/pelvis done on 8/6 showed:Suprapubic catheter in place. No cutaneous or subcutaneous edema along  the catheter tract. No abscess or hematoma.   No obstructive or acute inflammatory changes in the gastrointestinal   and genitourinary tracts. Nonobstructing bilateral punctate nephrolithiasis.  Patient on Flomax  Patient was evaluated by urology with recommendations appreciate input.Patient recommended to follow up with established urology at discharge    4. Primary hypertension  Uncontrolled  Patient on  Lisinopril, Amlodipine  Monitor BP    5.Hyperlipidemia  Continue Pravastatin    6.Chronic Kidney disease   Avoid nephrotoxins  Monitor BMP    7.Physical deconditioning patient with comorbid conditions   PT/OT  Palliative team on consult     8.Generalized anxiety

## 2024-08-08 NOTE — PROGRESS NOTES
[facility]  PHYSICAL THERAPY MISSED TREATMENT NOTE  [facility] NEUROSCIENCES 4A    Date: 2024  Patient Name: Ceferino Sanches Jr        MRN: [id number]   : 1945  (78 y.o.)  Gender: male                REASON FOR MISSED TREATMENT:  At time of attempt, pt is working with OT. Nursing requests not to attempt pt after OT session due to seizure activity yesterday and allowing pt to rest. PT to reattempt at later date/time.     Ирина Wade, PT, DPT

## 2024-08-08 NOTE — FLOWSHEET NOTE
08/07/24 2230   Treatment Team Notification   Reason for Communication Evaluate;Change in status  (HR 140s-150s)   Name of Team Member Notified Yin Cueva   Treatment Team Role Advanced Practice Nurse   Method of Communication Secure Message   Response Waiting for response   Notification Time 2230     Provider notified of pt's heat rate being in the 140s-150s. EKG shows SVT. Temperature rectally 100.5, currently off cooling blanket d/t continual shivering. Requested to assess patient. Provider to bedside. See new orders.

## 2024-08-08 NOTE — PROGRESS NOTES
[facility]  INPATIENT OCCUPATIONAL THERAPY  [facility] NEUROSCIENCES 4A  EVALUATION    Time:   Time In: 1128  Time Out: 1213  Timed Code Treatment Minutes: 35 Minutes  Minutes: 45          Date: 2024  Patient Name: Ceferino Sanches Jr,   Gender: male      MRN: [id number]  : 1945  (78 y.o.)  Referring Practitioner: Darcy Perdue APRN - CNP  Diagnosis: bleeding  Additional Pertinent Hx: per H&P on  \"Ceferino Sanches Jr is a 78 y.o. 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 They present to the hospital with Post-op Problem   and are admitted for Seizure-like activity ([facility]). Patient was admitted  for seizure-like activity on . Rapid response was called on  AM for AMS, possible seizure. Patient started to become more alert and oriented while on the way to the CT scanner this morning. Patient was transferred to stepdown. Was noted to have a temperature of 104.4 this afternoon. Patient was drowsy during discussion. Oriented to person, place, time, situation. Attempted to discuss code status however patient drowsy. Discussed with patient's sister, she state she has healthcare POA and living will documents at home - she will bring them in to the hospital. Palliative care was consulted for Goals of Care.\" previous toe fx, WBAT per ortho    Restrictions/Precautions:  Restrictions/Precautions: Weight Bearing, Seizure, Fall Risk, General Precautions  Position Activity Restriction  Other position/activity restrictions: hx L 2-3 metatarsal fx, Per perfectserve with ortho, pt  time for completion, cues for hand placement, with verbal cues. Verbal cues for body positioning  Stand to Sit: Contact Guard Assistance, X 1, cues for hand placement, with verbal cues, to/from chair with arms. Verbal cues for body alignment     FUNCTIONAL MOBILITY:  Assistive Device: Rolling Walker  Assist Level:  Moderate Assistance, X 1, with verbal cues , and with increased time for completion.   Distance:  2 feet to bedside recliner   Verbal and tactile cues for walker management, verbal cues for sequencing and foot placement as pt tended to only move the walker forward with out taking steps     AM-[facility] Inpatient Daily Activity Raw Score: 16  AM-[facility] Inpatient ADL T-Scale Score : 35.96  ADL Inpatient CMS 0-100% Score: 53.32    Activity Tolerance:  Patient tolerance of  treatment: Good treatment tolerance      Modified Luce:  Premorbid Functional Status: 0 - No symptoms at all. Fully independent.  Current Functional Status:  0 -No symptoms at all.  Patient is completely back to the way they were right before their stroke.  Education provided regarding stroke rehabilitation management.    Assessment:  Assessment: Prior to admission, pt was living at an assisted living [facility] [address] recieving minimal assistance for I/ADLs and functional mobility. Currently, pt is requiring an increased level of assistnace to perform all ADLs and functional mobility due to recent reason for hospitalization. Due to deficits listed below and the increased level of assistance, pt would benefit from OT services to improve independence and return to PLOF.  Performance deficits / Impairments: Decreased functional mobility , Decreased ADL status, Decreased ROM, Decreased strength, Decreased safe awareness, Decreased endurance, Decreased balance  Prognosis: Good  REQUIRES OT FOLLOW-UP: Yes  Decision Making: Medium Complexity    Treatment Initiated: Treatment and education initiated within context of evaluation.  Evaluation time included review

## 2024-08-09 LAB
ALBUMIN SERPL BCG-MCNC: 3.1 G/DL (ref 3.5–5.1)
ALP SERPL-CCNC: 122 U/L (ref 38–126)
ALT SERPL W/O P-5'-P-CCNC: 15 U/L (ref 11–66)
ANION GAP SERPL CALC-SCNC: 12 MEQ/L (ref 8–16)
AST SERPL-CCNC: 34 U/L (ref 5–40)
BACTERIA UR CULT: ABNORMAL
BACTERIA UR CULT: ABNORMAL
BILIRUB SERPL-MCNC: 0.3 MG/DL (ref 0.3–1.2)
BUN SERPL-MCNC: 36 MG/DL (ref 7–22)
CALCIUM SERPL-MCNC: 8.3 MG/DL (ref 8.5–10.5)
CHLORIDE SERPL-SCNC: 112 MEQ/L (ref 98–111)
CO2 SERPL-SCNC: 15 MEQ/L (ref 23–33)
CREAT SERPL-MCNC: 1.6 MG/DL (ref 0.4–1.2)
DEPRECATED RDW RBC AUTO: 52.8 FL (ref 35–45)
ERYTHROCYTE [DISTWIDTH] IN BLOOD BY AUTOMATED COUNT: 13.9 % (ref 11.5–14.5)
GFR SERPL CREATININE-BSD FRML MDRD: 44 ML/MIN/1.73M2
GLUCOSE SERPL-MCNC: 142 MG/DL (ref 70–108)
HCT VFR BLD AUTO: 33.8 % (ref 42–52)
HGB BLD-MCNC: 10.7 GM/DL (ref 14–18)
MCH RBC QN AUTO: 33.1 PG (ref 26–33)
MCHC RBC AUTO-ENTMCNC: 31.7 GM/DL (ref 32.2–35.5)
MCV RBC AUTO: 104.6 FL (ref 80–94)
ORGANISM: ABNORMAL
ORGANISM: ABNORMAL
PLATELET # BLD AUTO: 156 THOU/MM3 (ref 130–400)
PMV BLD AUTO: 10.5 FL (ref 9.4–12.4)
POTASSIUM SERPL-SCNC: 3.7 MEQ/L (ref 3.5–5.2)
PROT SERPL-MCNC: 6.5 G/DL (ref 6.1–8)
RBC # BLD AUTO: 3.23 MILL/MM3 (ref 4.7–6.1)
SODIUM SERPL-SCNC: 139 MEQ/L (ref 135–145)
WBC # BLD AUTO: 21.5 THOU/MM3 (ref 4.8–10.8)

## 2024-08-09 PROCEDURE — 97110 THERAPEUTIC EXERCISES: CPT

## 2024-08-09 PROCEDURE — 97530 THERAPEUTIC ACTIVITIES: CPT

## 2024-08-09 PROCEDURE — 6370000000 HC RX 637 (ALT 250 FOR IP): Performed by: STUDENT IN AN ORGANIZED HEALTH CARE EDUCATION/TRAINING PROGRAM

## 2024-08-09 PROCEDURE — 6360000002 HC RX W HCPCS: Performed by: NURSE PRACTITIONER

## 2024-08-09 PROCEDURE — 85027 COMPLETE CBC AUTOMATED: CPT

## 2024-08-09 PROCEDURE — 97535 SELF CARE MNGMENT TRAINING: CPT

## 2024-08-09 PROCEDURE — 6370000000 HC RX 637 (ALT 250 FOR IP): Performed by: NURSE PRACTITIONER

## 2024-08-09 PROCEDURE — 6370000000 HC RX 637 (ALT 250 FOR IP)

## 2024-08-09 PROCEDURE — 2580000003 HC RX 258: Performed by: STUDENT IN AN ORGANIZED HEALTH CARE EDUCATION/TRAINING PROGRAM

## 2024-08-09 PROCEDURE — 99232 SBSQ HOSP IP/OBS MODERATE 35: CPT | Performed by: INTERNAL MEDICINE

## 2024-08-09 PROCEDURE — 36415 COLL VENOUS BLD VENIPUNCTURE: CPT

## 2024-08-09 PROCEDURE — 6360000002 HC RX W HCPCS

## 2024-08-09 PROCEDURE — 2580000003 HC RX 258

## 2024-08-09 PROCEDURE — 2580000003 HC RX 258: Performed by: NURSE PRACTITIONER

## 2024-08-09 PROCEDURE — 97162 PT EVAL MOD COMPLEX 30 MIN: CPT

## 2024-08-09 PROCEDURE — 80053 COMPREHEN METABOLIC PANEL: CPT

## 2024-08-09 PROCEDURE — 2060000000 HC ICU INTERMEDIATE R&B

## 2024-08-09 RX ORDER — CIPROFLOXACIN 500 MG/1
500 TABLET, FILM COATED ORAL EVERY 12 HOURS SCHEDULED
Status: DISCONTINUED | OUTPATIENT
Start: 2024-08-09 | End: 2024-08-13 | Stop reason: HOSPADM

## 2024-08-09 RX ORDER — SODIUM CHLORIDE, SODIUM LACTATE, POTASSIUM CHLORIDE, CALCIUM CHLORIDE 600; 310; 30; 20 MG/100ML; MG/100ML; MG/100ML; MG/100ML
INJECTION, SOLUTION INTRAVENOUS CONTINUOUS
Status: ACTIVE | OUTPATIENT
Start: 2024-08-09 | End: 2024-08-10

## 2024-08-09 RX ADMIN — CIPROFLOXACIN 500 MG: 500 TABLET, FILM COATED ORAL at 21:25

## 2024-08-09 RX ADMIN — ENOXAPARIN SODIUM 40 MG: 100 INJECTION SUBCUTANEOUS at 08:51

## 2024-08-09 RX ADMIN — TAMSULOSIN HYDROCHLORIDE 0.4 MG: 0.4 CAPSULE ORAL at 08:51

## 2024-08-09 RX ADMIN — OXCARBAZEPINE 450 MG: 300 TABLET, FILM COATED ORAL at 21:26

## 2024-08-09 RX ADMIN — AMLODIPINE BESYLATE 10 MG: 10 TABLET ORAL at 08:52

## 2024-08-09 RX ADMIN — POLYVINYL ALCOHOL 1 DROP: 1.4 SOLUTION/ DROPS OPHTHALMIC at 08:51

## 2024-08-09 RX ADMIN — MIRTAZAPINE 15 MG: 15 TABLET, FILM COATED ORAL at 21:26

## 2024-08-09 RX ADMIN — OXCARBAZEPINE 450 MG: 300 TABLET, FILM COATED ORAL at 08:51

## 2024-08-09 RX ADMIN — LEVETIRACETAM 1250 MG: 500 TABLET, FILM COATED ORAL at 21:26

## 2024-08-09 RX ADMIN — POLYVINYL ALCOHOL 1 DROP: 1.4 SOLUTION/ DROPS OPHTHALMIC at 21:26

## 2024-08-09 RX ADMIN — Medication 10.5 MG: at 21:26

## 2024-08-09 RX ADMIN — PIPERACILLIN AND TAZOBACTAM 3375 MG: 3; .375 INJECTION, POWDER, FOR SOLUTION INTRAVENOUS at 12:45

## 2024-08-09 RX ADMIN — PRAVASTATIN SODIUM 40 MG: 40 TABLET ORAL at 08:52

## 2024-08-09 RX ADMIN — LISINOPRIL 10 MG: 10 TABLET ORAL at 08:52

## 2024-08-09 RX ADMIN — LEVETIRACETAM 1250 MG: 500 TABLET, FILM COATED ORAL at 08:51

## 2024-08-09 RX ADMIN — SODIUM CHLORIDE, PRESERVATIVE FREE 10 ML: 5 INJECTION INTRAVENOUS at 08:54

## 2024-08-09 RX ADMIN — GABAPENTIN 300 MG: 300 CAPSULE ORAL at 08:51

## 2024-08-09 RX ADMIN — ACETAMINOPHEN 650 MG: 325 TABLET ORAL at 02:29

## 2024-08-09 RX ADMIN — ACETAMINOPHEN 650 MG: 325 TABLET ORAL at 08:53

## 2024-08-09 RX ADMIN — PIPERACILLIN AND TAZOBACTAM 3375 MG: 3; .375 INJECTION, POWDER, FOR SOLUTION INTRAVENOUS at 04:14

## 2024-08-09 RX ADMIN — SODIUM CHLORIDE, POTASSIUM CHLORIDE, SODIUM LACTATE AND CALCIUM CHLORIDE: 600; 310; 30; 20 INJECTION, SOLUTION INTRAVENOUS at 15:50

## 2024-08-09 RX ADMIN — SODIUM CHLORIDE, PRESERVATIVE FREE 10 ML: 5 INJECTION INTRAVENOUS at 21:26

## 2024-08-09 RX ADMIN — ACETAMINOPHEN 650 MG: 325 TABLET ORAL at 18:45

## 2024-08-09 ASSESSMENT — PAIN - FUNCTIONAL ASSESSMENT
PAIN_FUNCTIONAL_ASSESSMENT: PREVENTS OR INTERFERES SOME ACTIVE ACTIVITIES AND ADLS
PAIN_FUNCTIONAL_ASSESSMENT: PREVENTS OR INTERFERES SOME ACTIVE ACTIVITIES AND ADLS

## 2024-08-09 ASSESSMENT — PAIN DESCRIPTION - ORIENTATION
ORIENTATION: LOWER
ORIENTATION: LOWER

## 2024-08-09 ASSESSMENT — PAIN DESCRIPTION - DESCRIPTORS
DESCRIPTORS: DISCOMFORT
DESCRIPTORS: DISCOMFORT

## 2024-08-09 ASSESSMENT — PAIN DESCRIPTION - PAIN TYPE
TYPE: ACUTE PAIN
TYPE: ACUTE PAIN

## 2024-08-09 ASSESSMENT — PAIN SCALES - GENERAL
PAINLEVEL_OUTOF10: 3
PAINLEVEL_OUTOF10: 3

## 2024-08-09 ASSESSMENT — PAIN DESCRIPTION - FREQUENCY
FREQUENCY: INTERMITTENT
FREQUENCY: INTERMITTENT

## 2024-08-09 ASSESSMENT — PAIN DESCRIPTION - ONSET
ONSET: ON-GOING
ONSET: ON-GOING

## 2024-08-09 ASSESSMENT — PAIN DESCRIPTION - LOCATION
LOCATION: ABDOMEN
LOCATION: ABDOMEN

## 2024-08-09 NOTE — PROGRESS NOTES
[facility]  INPATIENT PHYSICAL THERAPY  EVALUATION  [facility] NEUROSCIENCES 4A - 4A-12/012-A    Discharge Recommendations: Discharge Recommendations: Subacute/Skilled Nursing Facility  Equipment Recommendations: Equipment Needed: No    Time In: 0919  Time Out: 1007  Timed Code Treatment Minutes: 23 Minutes  Minutes: 48     Minute Variance  Variance: 13  Reason:  (Physician present and addresses pt questions/concerns)    Date: 2024  Patient Name: Ceferino Sanches Jr,  Gender:  male        MRN: [id number]  : 1945  (78 y.o.)      Referring Practitioner: Darcy Perdue APRN - CNP  Diagnosis: Bleeding  Additional Pertinent Hx: Per EMR: \" \"Ceferino Sanches Jr is a 78 y.o. 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 They present to the hospital with Post-op Problem   and are admitted for Seizure-like activity ([facility]). Patient was admitted  for seizure-like activity on . Rapid response was called on 87 AM for AMS, possible seizure. Patient started to become more alert and oriented while on the way to the CT scanner this morning. Patient was transferred to stepdown. Was noted to have a temperature of 104.4 this afternoon. Patient was drowsy during discussion. Oriented to person, place, time, situation. Attempted to discuss code status however patient drowsy. Discussed with patient's sister, she state she has healthcare POA and living will documents at home - she will bring them in to the hospital. Palliative care was consulted for Goals of Care. previous toe fx, WBAT per ortho\" Pt  with sieizure activity on 8/7.     Restrictions/Precautions:  Restrictions/Precautions: Weight Bearing, Seizure, Fall Risk, General Precautions  Position Activity Restriction  Other position/activity restrictions: hx L 2-3 metatarsal fx, Per perfectserve with ortho, pt WBAT no need for surgical shoes at this time    Subjective:  Chart Reviewed: Yes  Patient assessed for rehabilitation services?: Yes  Family / Caregiver Present: Yes  Subjective: Clearance from RN to see pt this date. Pt was sitting in bedside chair when PT arrived. Pt agreeable to Pt    General:  Overall Orientation Status: Within Normal Limits  Orientation Level: Oriented X4  Overall Cognitive Status: WFL  Vision: Impaired  Vision Exceptions: Wears glasses at all times  Hearing: Within functional limits       Pain: -/10: Pt denies pain    Vitals: Vitals not assessed per clinical judgement, see nursing flowsheet    Social/Functional History:    Lives With:  (lives at assisted living at [facility])  Type of Home: Assisted living  Home Layout: One level  Home Access: Level entry  Home Equipment: Cane, Walker - Rolling, Walker - 4-Wheeled     Bathroom Shower/Tub: Tub/Shower unit  Bathroom Toilet: Handicap height  Bathroom Equipment: Grab bars in shower, Safety frame  Bathroom Accessibility: Accessible       ADL Assistance: Needs assistance  Homemaking Assistance: Needs assistance  Homemaking Responsibilities: No  Ambulation Assistance: Independent  Transfer Assistance: Independent    Active : No  Leisure & Hobbies: Making jewlery  Additional Comments: Per OT, confirmed with PT: utilized Rw to ambulate, assistance to shower but independent for all ADLs    OBJECTIVE:  Range of Motion:  Right Lower Extremity: WFL  Left Lower Extremity: WFL    Strength:  Right Lower Extremity: Impaired - 3+/5 grossly  Left Lower Extremity: Impaired - 3+/5 grossly    Balance:  Static Sitting Balance:  Independent  Dynamic Sitting Balance: Independent  Static Standing

## 2024-08-09 NOTE — CARE COORDINATION
8/9/24, 11:03 AM EDT    DISCHARGE PLANNING EVALUATION    This SW did leave a message with Stella in admissions at [facility] to make her aware that patient's sister Daina and patient are both agreeable to SNF stay before returning to [place]. FARNAZ did leave a message for Brina (director of [place]), made her aware that the goal is to go skilled and FARNAZ will work with Stella on this.     3:04 PM- Received a message from Stella, they are able to accept Ceferino when he is medically ready for discharge.

## 2024-08-09 NOTE — CARE COORDINATION
8/9/24, 11:47 AM EDT    DISCHARGE ON GOING EVALUATION    Ceferino Sanches [facility] day: 1  Location: -12/012-A Reason for admit: Bleeding [R58]  Seizure disorder (HCC) [G40.909]  Seizure-like activity (HCC) [R56.9]  Dementia without behavioral disturbance (HCC) [F03.90]     Procedures:   8/7 EEG  8/7 Midline line placement  Imaging since last note: none      Barriers to Discharge: Tmax 100.4, creatinine 1.6, WBC 21.5, urine (+) Pseudomonas aeruginosa. Urology to follow prn, Neurology has signed off, Palliative Care eval complete. Lovenox, Keppra, Zofran prn, IV Zosyn, electrolyte replacement protocols.     PCP: Jihan Lyon MD  Readmission Risk Score: 25.7    Patient Goals/Plan/Treatment Preferences: From [facility]. Planning skilled at discharge. SW following. Refer to SW note.

## 2024-08-09 NOTE — PROGRESS NOTES
Hospitalist Progress Note  Internal Medicine Resident      Patient: Ceferino Sanches Jr 78 y.o. male      Unit/Bed: 4A-12/012-A    Admit Date: 8/6/2024      ASSESSMENT AND PLAN  Active Problems    Complicated UTI: Sepsis as below.  Urine culture preliminary report shows non fermenting gram negative bacilli. Identified as pansensitive pseudomonas.  Patient started on IV Zosyn and vancomycin. Discontinued Vanco as patient has gram-negative bacilli.  Since bacteria is been identified as Pseudomonas will change patient over to Cipro.  Patient has received 3 days of Zosyn.  Will continue for 10 days total.  -Changed to Cipro 500 bid x 7 days     Seizure-like activity:  Family states he started to have a gaze, talk nonsense and mess with his hands. Which apparently is his seizure activity. Hx of seizure disorder since brain injury at 9. Per sister last seizure ~ 1 year ago. Rapid called 8/7 after tele sitter contacted nursing. Pt staring off, disoriented, \"messing with hands\" Pt responding nonsensically. History of seizure disorder. See's epilepsy specialist in [place]. Follows with neurology as OP. Loading dose of Keppra given in ED. Prolactin was 7.6 in ED. However, per our Neurointerventionalist it is a \"worthless test that they never look at. It is neither sensitive or specific for seizure\"- for what it's worth. Was given Keppra IV home dose is Keppra XR 1250 mg BID, EEG was negative for any epileptiform - had sharps in temporal lobe, CT head was negative for acute pathology  -seizure precautions, patient was evaluated by neurology recommended to continue his Keppra 1250mg BID and the trileptal 450mg BID.    -Continue to follow with epilepsy specialist in [place] upon discharge see notes appreciate input  -No seizures reported overnight     Sepsis: 2/2 UTI.  SIRS 3/4, temp 104.4 max,  max, WBC 25.2 max, lactic acid 4.3, SOFA 4. Today afebrile. WBC remains elevated.  Patient received Tylenol at 2 AM and 8 AM.  rectal temperature 104.5.  .  8/7 patient was determined to be septic likely 2/2 UTI found to have gram-negative nonfermenting bacilli and urines.  Had sluggish pupils, normal EKG. CTH wo contrast performed, no focal deficits noted.  Patient determined to have a seizure after having bilateral lower extremity shaking and transferred to . Pt started on Vanc/Zosyn.   8/8: Pt noted to be in SVT -150 on tele. A&Ox4., Temp reduced. Urology saw and stated leave cath in place. Treat infxn. Have follow up with Uro outpt.     Last echo 7/1/2024: EF 55-60%, LV size, wall thickness, wall motion, diastolic function all normal.  Mild AR, MR, GA, trace TR, no stenosis noted, normal RV size and function,    Medications:    Infusion Medications    sodium chloride      sodium chloride      Scheduled Medications    levETIRAcetam  1,250 mg Oral BID    OXcarbazepine  450 mg Oral BID    piperacillin-tazobactam  3,375 mg IntraVENous Q8H    vancomycin (VANCOCIN) intermittent dosing (placeholder)   Other RX Placeholder    sodium chloride flush  5-40 mL IntraVENous 2 times per day    sodium chloride flush  5-40 mL IntraVENous 2 times per day    enoxaparin  40 mg SubCUTAneous Daily    amLODIPine  10 mg Oral Daily    gabapentin  300 mg Oral Daily    polyvinyl alcohol  1 drop Ophthalmic BID    lisinopril  10 mg Oral Daily    melatonin  10.5 mg Oral Nightly    mirtazapine  15 mg Oral Nightly    pravastatin  40 mg Oral Daily    tamsulosin  0.4 mg Oral Daily    PRN Meds: acetaminophen **OR** acetaminophen, sodium chloride flush, sodium chloride, LORazepam, sodium chloride flush, sodium chloride, potassium chloride **OR** potassium alternative oral replacement **OR** potassium chloride, magnesium sulfate, ondansetron **OR** ondansetron, polyethylene glycol    Exam:  BP (!) 142/72   Pulse (!) 109   Temp 99.8 °F (37.7 °C) (Rectal)   Resp 20   Ht 1.753 m (5' 9\")   Wt 86.6 kg (190 lb 14.7 oz)   SpO2 94%   BMI 28.19 kg/m²   General:

## 2024-08-09 NOTE — PROGRESS NOTES
[facility]  [facility] NEUROSCIENCES 4A  Occupational Therapy  Daily Note  Time:   Time In: 842  Time Out: 920  Timed Code Treatment Minutes: 38 Minutes  Minutes: 38          Date: 2024  Patient Name: Ceferino Sanches Jr,   Gender: male      Room: [facility]12/012-A  MRN: [id number]  : 1945  (78 y.o.)  Referring Practitioner: Darcy Perdue APRN - CNP  Diagnosis: bleeding  Additional Pertinent Hx: per H&P on  \"Ceferino Sanches Jr is a 78 y.o. 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 They present to the hospital with Post-op Problem   and are admitted for Seizure-like activity ([facility]). Patient was admitted  for seizure-like activity on . Rapid response was called on  AM for AMS, possible seizure. Patient started to become more alert and oriented while on the way to the CT scanner this morning. Patient was transferred to stepdown. Was noted to have a temperature of 104.4 this afternoon. Patient was drowsy during discussion. Oriented to person, place, time, situation. Attempted to discuss code status however patient drowsy. Discussed with patient's sister, she state she has healthcare POA and living will documents at home - she will bring them in to the hospital. Palliative care was consulted for Goals of Care.\" previous toe fx, WBAT per ortho    Restrictions/Precautions:  Restrictions/Precautions: Weight Bearing, Seizure, Fall Risk, General Precautions  Position Activity Restriction  Other position/activity restrictions: hx L 2-3 metatarsal fx, Per perfectserve with

## 2024-08-10 LAB
ANION GAP SERPL CALC-SCNC: 13 MEQ/L (ref 8–16)
BASOPHILS ABSOLUTE: 0 THOU/MM3 (ref 0–0.1)
BASOPHILS NFR BLD AUTO: 0.3 %
BUN SERPL-MCNC: 25 MG/DL (ref 7–22)
CALCIUM SERPL-MCNC: 8.4 MG/DL (ref 8.5–10.5)
CHLORIDE SERPL-SCNC: 111 MEQ/L (ref 98–111)
CO2 SERPL-SCNC: 16 MEQ/L (ref 23–33)
CREAT SERPL-MCNC: 1.3 MG/DL (ref 0.4–1.2)
DEPRECATED RDW RBC AUTO: 52.9 FL (ref 35–45)
EOSINOPHIL NFR BLD AUTO: 1.6 %
EOSINOPHILS ABSOLUTE: 0.2 THOU/MM3 (ref 0–0.4)
ERYTHROCYTE [DISTWIDTH] IN BLOOD BY AUTOMATED COUNT: 14 % (ref 11.5–14.5)
GFR SERPL CREATININE-BSD FRML MDRD: 56 ML/MIN/1.73M2
GLUCOSE SERPL-MCNC: 139 MG/DL (ref 70–108)
HCT VFR BLD AUTO: 29.7 % (ref 42–52)
HGB BLD-MCNC: 9.4 GM/DL (ref 14–18)
IMM GRANULOCYTES # BLD AUTO: 0.14 THOU/MM3 (ref 0–0.07)
IMM GRANULOCYTES NFR BLD AUTO: 0.9 %
LYMPHOCYTES ABSOLUTE: 0.9 THOU/MM3 (ref 1–4.8)
LYMPHOCYTES NFR BLD AUTO: 5.6 %
MAGNESIUM SERPL-MCNC: 2.1 MG/DL (ref 1.6–2.4)
MCH RBC QN AUTO: 32.6 PG (ref 26–33)
MCHC RBC AUTO-ENTMCNC: 31.6 GM/DL (ref 32.2–35.5)
MCV RBC AUTO: 103.1 FL (ref 80–94)
MONOCYTES ABSOLUTE: 0.9 THOU/MM3 (ref 0.4–1.3)
MONOCYTES NFR BLD AUTO: 5.7 %
NEUTROPHILS ABSOLUTE: 13.2 THOU/MM3 (ref 1.8–7.7)
NEUTROPHILS NFR BLD AUTO: 85.9 %
NRBC BLD AUTO-RTO: 0 /100 WBC
PLATELET # BLD AUTO: 143 THOU/MM3 (ref 130–400)
PMV BLD AUTO: 11 FL (ref 9.4–12.4)
POTASSIUM SERPL-SCNC: 3.7 MEQ/L (ref 3.5–5.2)
RBC # BLD AUTO: 2.88 MILL/MM3 (ref 4.7–6.1)
SCAN OF BLOOD SMEAR: NORMAL
SODIUM SERPL-SCNC: 140 MEQ/L (ref 135–145)
TOXIC GRANULES BLD QL SMEAR: PRESENT
WBC # BLD AUTO: 15.4 THOU/MM3 (ref 4.8–10.8)

## 2024-08-10 PROCEDURE — 6370000000 HC RX 637 (ALT 250 FOR IP)

## 2024-08-10 PROCEDURE — 2580000003 HC RX 258: Performed by: NURSE PRACTITIONER

## 2024-08-10 PROCEDURE — 99232 SBSQ HOSP IP/OBS MODERATE 35: CPT | Performed by: INTERNAL MEDICINE

## 2024-08-10 PROCEDURE — 2580000003 HC RX 258: Performed by: STUDENT IN AN ORGANIZED HEALTH CARE EDUCATION/TRAINING PROGRAM

## 2024-08-10 PROCEDURE — 2580000003 HC RX 258

## 2024-08-10 PROCEDURE — 6370000000 HC RX 637 (ALT 250 FOR IP): Performed by: STUDENT IN AN ORGANIZED HEALTH CARE EDUCATION/TRAINING PROGRAM

## 2024-08-10 PROCEDURE — 83735 ASSAY OF MAGNESIUM: CPT

## 2024-08-10 PROCEDURE — 6370000000 HC RX 637 (ALT 250 FOR IP): Performed by: NURSE PRACTITIONER

## 2024-08-10 PROCEDURE — 2700000000 HC OXYGEN THERAPY PER DAY

## 2024-08-10 PROCEDURE — 6360000002 HC RX W HCPCS

## 2024-08-10 PROCEDURE — 85025 COMPLETE CBC W/AUTO DIFF WBC: CPT

## 2024-08-10 PROCEDURE — 2060000000 HC ICU INTERMEDIATE R&B

## 2024-08-10 PROCEDURE — 36415 COLL VENOUS BLD VENIPUNCTURE: CPT

## 2024-08-10 PROCEDURE — 80048 BASIC METABOLIC PNL TOTAL CA: CPT

## 2024-08-10 RX ORDER — SODIUM CHLORIDE, SODIUM LACTATE, POTASSIUM CHLORIDE, CALCIUM CHLORIDE 600; 310; 30; 20 MG/100ML; MG/100ML; MG/100ML; MG/100ML
INJECTION, SOLUTION INTRAVENOUS CONTINUOUS
Status: ACTIVE | OUTPATIENT
Start: 2024-08-10 | End: 2024-08-10

## 2024-08-10 RX ADMIN — ENOXAPARIN SODIUM 40 MG: 100 INJECTION SUBCUTANEOUS at 08:04

## 2024-08-10 RX ADMIN — OXCARBAZEPINE 450 MG: 300 TABLET, FILM COATED ORAL at 20:39

## 2024-08-10 RX ADMIN — CIPROFLOXACIN 500 MG: 500 TABLET, FILM COATED ORAL at 08:07

## 2024-08-10 RX ADMIN — OXCARBAZEPINE 450 MG: 300 TABLET, FILM COATED ORAL at 08:09

## 2024-08-10 RX ADMIN — Medication 10.5 MG: at 20:38

## 2024-08-10 RX ADMIN — SODIUM CHLORIDE, PRESERVATIVE FREE 10 ML: 5 INJECTION INTRAVENOUS at 07:58

## 2024-08-10 RX ADMIN — GABAPENTIN 300 MG: 300 CAPSULE ORAL at 08:07

## 2024-08-10 RX ADMIN — SODIUM CHLORIDE, PRESERVATIVE FREE 10 ML: 5 INJECTION INTRAVENOUS at 20:39

## 2024-08-10 RX ADMIN — POLYVINYL ALCOHOL 1 DROP: 1.4 SOLUTION/ DROPS OPHTHALMIC at 20:39

## 2024-08-10 RX ADMIN — ACETAMINOPHEN 650 MG: 325 TABLET ORAL at 17:54

## 2024-08-10 RX ADMIN — POLYVINYL ALCOHOL 1 DROP: 1.4 SOLUTION/ DROPS OPHTHALMIC at 07:58

## 2024-08-10 RX ADMIN — SODIUM CHLORIDE, POTASSIUM CHLORIDE, SODIUM LACTATE AND CALCIUM CHLORIDE: 600; 310; 30; 20 INJECTION, SOLUTION INTRAVENOUS at 14:10

## 2024-08-10 RX ADMIN — CIPROFLOXACIN 500 MG: 500 TABLET, FILM COATED ORAL at 20:39

## 2024-08-10 RX ADMIN — TAMSULOSIN HYDROCHLORIDE 0.4 MG: 0.4 CAPSULE ORAL at 08:08

## 2024-08-10 RX ADMIN — MIRTAZAPINE 15 MG: 15 TABLET, FILM COATED ORAL at 20:39

## 2024-08-10 RX ADMIN — PRAVASTATIN SODIUM 40 MG: 40 TABLET ORAL at 08:08

## 2024-08-10 RX ADMIN — LEVETIRACETAM 1250 MG: 500 TABLET, FILM COATED ORAL at 20:38

## 2024-08-10 RX ADMIN — LEVETIRACETAM 1250 MG: 500 TABLET, FILM COATED ORAL at 08:07

## 2024-08-10 RX ADMIN — LISINOPRIL 10 MG: 10 TABLET ORAL at 08:07

## 2024-08-10 RX ADMIN — AMLODIPINE BESYLATE 10 MG: 10 TABLET ORAL at 08:07

## 2024-08-10 ASSESSMENT — PAIN DESCRIPTION - FREQUENCY: FREQUENCY: INTERMITTENT

## 2024-08-10 ASSESSMENT — PAIN DESCRIPTION - ORIENTATION
ORIENTATION: LOWER
ORIENTATION: LEFT

## 2024-08-10 ASSESSMENT — PAIN DESCRIPTION - DESCRIPTORS
DESCRIPTORS: ACHING
DESCRIPTORS: DISCOMFORT

## 2024-08-10 ASSESSMENT — PAIN DESCRIPTION - ONSET: ONSET: ON-GOING

## 2024-08-10 ASSESSMENT — PAIN DESCRIPTION - PAIN TYPE: TYPE: ACUTE PAIN

## 2024-08-10 ASSESSMENT — PAIN SCALES - GENERAL: PAINLEVEL_OUTOF10: 2

## 2024-08-10 ASSESSMENT — PAIN DESCRIPTION - LOCATION
LOCATION: KNEE
LOCATION: ABDOMEN

## 2024-08-10 NOTE — PROGRESS NOTES
Hospitalist Progress Note  Internal Medicine Resident      Patient: Ceferino Sanches Jr 78 y.o. male      Unit/Bed: 4A-12/012-A    Admit Date: 8/6/2024      ASSESSMENT AND PLAN  Active Problems    Complicated UTI: Sepsis as below.  Cx: pansensitive pseudomonas. Received 3 days of Zosyn. 10 days total.  -Continue Cipro 500 bid x 7 days (day 2/7)     Seizure-like activity:  Previous hx. Symptoms Blank stare, Started w/ TBI 10 yo. Last seizure ~ 1 year ago. Rapid 8/7 for these symptoms. See's epilepsy specialist in [place]. Follows with neurology as OP. Keppra XR 1250 mg BID at home, EEG sharps in temporal lobe, CT head negative  -seizure precautions  -Keppra 1250mg BID and the trileptal 450mg BID per neurology  -Continue to follow with epilepsy specialist   -No seizures reported overnight     Sepsis: 2/2 UTI.  SIRS 3/4, temp 104.4 max,  max, WBC 25.2 max, lactic acid 4.3, SOFA 4. Today afebrile- Temp 37.9 w/ tylenol. WBC remains elevated 15.4, HR max 115, BC NGTD  -Pt continues on Cipro  -Blood culture in progress will continue to follow    Surgical site bleeding: S/p suprapubic catheter placement 8/6/24. CT A/P no problems in GI or  tracts, hematuria on UA.  -Monitor site for further bleeding, apply dry dressing as needed  -Continue Flomax  -Monitor output    BRENNA on CKD stage II-improved: Creat 1.6, baseline ~1.2.  1.3 today.  -Avoid nephrotoxic agents  -Pharmacy to renally dose medications  -Will start  mL/h x 10 hours  -BMP in a.m.  Carpal tunnel: Patient has symptoms of bilateral carpal tunnel.  Right hand has thenar muscle atrophy.  Positive Tinel's test on both hands.  Patient endorses waking up at night wringing hands.  Endorses weakness, numbness, and tingling in fingertips as well as difficulty lifting things.  -Will attempt to get patient neutral wrist braces however, have not had luck in ordering them.  -Will give referral to [facility].      Resolved Problems    Chronic Conditions  glycol    Exam:  BP (!) 144/61   Pulse (!) 107   Temp 98.1 °F (36.7 °C) (Oral)   Resp 20   Ht 1.753 m (5' 9\")   Wt 86.6 kg (190 lb 14.7 oz)   SpO2 95%   BMI 28.19 kg/m²   General: WD/WN, older male, laying in bed, in no acute distress, appears stated age.  Eyes:  PERRL. Conjunctivae/corneas clear.  HENT: NC/AT confirmed nares normal. Oral mucosa moist.  Hearing intact.  Neck: Supple, with full range of motion. Trachea midline.  No gross JVD appreciated.  Respiratory:  Normal effort. Clear to auscultation, without rales or wheezes or rhonchi.  Cardiovascular: RRR, normal S1/S2 without murmurs, rubs, or gallops  No LE edema  Abdomen: Soft, non-tender, non-distended with normal bowel sounds.  Musculoskeletal: No joint swelling or tenderness. Normal tone. No abnormal movements.   Skin: Warm and dry. No rashes or lesions.  Neurologic:  No focal sensory/motor deficits in the upper or lower extremities. Cranial nerves:  grossly non-focal 2-12.  Bilateral positive Tinel's test, thenar atrophy on right hand on physical exam.  Bilateral  weakness 4+ left, 4 right.  Psychiatric: Alert and oriented, normal insight and thought content.  Pleasant and cooperative with exam  Capillary Refill: Brisk,< 3 seconds.  Peripheral Pulses: +2 palpable, equal bilaterally.       Labs/Radiology: See chart or assessment above.     Electronically signed by Artie Iglesias DO on 8/10/2024 at 8:47 AM  Case was discussed with Attending, Dr. Diaz.

## 2024-08-11 LAB
ANION GAP SERPL CALC-SCNC: 14 MEQ/L (ref 8–16)
BASOPHILS ABSOLUTE: 0 THOU/MM3 (ref 0–0.1)
BASOPHILS NFR BLD AUTO: 0.4 %
BUN SERPL-MCNC: 19 MG/DL (ref 7–22)
CALCIUM SERPL-MCNC: 8.6 MG/DL (ref 8.5–10.5)
CHLORIDE SERPL-SCNC: 112 MEQ/L (ref 98–111)
CO2 SERPL-SCNC: 18 MEQ/L (ref 23–33)
CREAT SERPL-MCNC: 1.2 MG/DL (ref 0.4–1.2)
DEPRECATED RDW RBC AUTO: 51.9 FL (ref 35–45)
EKG ATRIAL RATE: 100 BPM
EKG P AXIS: 65 DEGREES
EKG P-R INTERVAL: 158 MS
EKG Q-T INTERVAL: 328 MS
EKG QRS DURATION: 94 MS
EKG QTC CALCULATION (BAZETT): 423 MS
EKG R AXIS: 30 DEGREES
EKG T AXIS: 53 DEGREES
EKG VENTRICULAR RATE: 100 BPM
EOSINOPHIL NFR BLD AUTO: 2.5 %
EOSINOPHILS ABSOLUTE: 0.3 THOU/MM3 (ref 0–0.4)
ERYTHROCYTE [DISTWIDTH] IN BLOOD BY AUTOMATED COUNT: 14 % (ref 11.5–14.5)
GFR SERPL CREATININE-BSD FRML MDRD: 62 ML/MIN/1.73M2
GLUCOSE SERPL-MCNC: 129 MG/DL (ref 70–108)
HCT VFR BLD AUTO: 30.3 % (ref 42–52)
HGB BLD-MCNC: 9.7 GM/DL (ref 14–18)
IMM GRANULOCYTES # BLD AUTO: 0.04 THOU/MM3 (ref 0–0.07)
IMM GRANULOCYTES NFR BLD AUTO: 0.4 %
LYMPHOCYTES ABSOLUTE: 1 THOU/MM3 (ref 1–4.8)
LYMPHOCYTES NFR BLD AUTO: 8.7 %
MAGNESIUM SERPL-MCNC: 2.1 MG/DL (ref 1.6–2.4)
MCH RBC QN AUTO: 32.4 PG (ref 26–33)
MCHC RBC AUTO-ENTMCNC: 32 GM/DL (ref 32.2–35.5)
MCV RBC AUTO: 101.3 FL (ref 80–94)
MONOCYTES ABSOLUTE: 1.1 THOU/MM3 (ref 0.4–1.3)
MONOCYTES NFR BLD AUTO: 10.1 %
NEUTROPHILS ABSOLUTE: 8.7 THOU/MM3 (ref 1.8–7.7)
NEUTROPHILS NFR BLD AUTO: 77.9 %
NRBC BLD AUTO-RTO: 0 /100 WBC
PHOSPHATE SERPL-MCNC: 3.5 MG/DL (ref 2.4–4.7)
PLATELET # BLD AUTO: 146 THOU/MM3 (ref 130–400)
PMV BLD AUTO: 10.9 FL (ref 9.4–12.4)
POTASSIUM SERPL-SCNC: 3.6 MEQ/L (ref 3.5–5.2)
RBC # BLD AUTO: 2.99 MILL/MM3 (ref 4.7–6.1)
SODIUM SERPL-SCNC: 144 MEQ/L (ref 135–145)
WBC # BLD AUTO: 11.2 THOU/MM3 (ref 4.8–10.8)

## 2024-08-11 PROCEDURE — 93005 ELECTROCARDIOGRAM TRACING: CPT | Performed by: INTERNAL MEDICINE

## 2024-08-11 PROCEDURE — 6360000002 HC RX W HCPCS

## 2024-08-11 PROCEDURE — 6370000000 HC RX 637 (ALT 250 FOR IP): Performed by: NURSE PRACTITIONER

## 2024-08-11 PROCEDURE — 2580000003 HC RX 258

## 2024-08-11 PROCEDURE — 85025 COMPLETE CBC W/AUTO DIFF WBC: CPT

## 2024-08-11 PROCEDURE — 80048 BASIC METABOLIC PNL TOTAL CA: CPT

## 2024-08-11 PROCEDURE — 6370000000 HC RX 637 (ALT 250 FOR IP)

## 2024-08-11 PROCEDURE — 6370000000 HC RX 637 (ALT 250 FOR IP): Performed by: STUDENT IN AN ORGANIZED HEALTH CARE EDUCATION/TRAINING PROGRAM

## 2024-08-11 PROCEDURE — 83735 ASSAY OF MAGNESIUM: CPT

## 2024-08-11 PROCEDURE — 2060000000 HC ICU INTERMEDIATE R&B

## 2024-08-11 PROCEDURE — 2580000003 HC RX 258: Performed by: INTERNAL MEDICINE

## 2024-08-11 PROCEDURE — 93010 ELECTROCARDIOGRAM REPORT: CPT | Performed by: INTERNAL MEDICINE

## 2024-08-11 PROCEDURE — 84100 ASSAY OF PHOSPHORUS: CPT

## 2024-08-11 PROCEDURE — 2580000003 HC RX 258: Performed by: NURSE PRACTITIONER

## 2024-08-11 PROCEDURE — 99232 SBSQ HOSP IP/OBS MODERATE 35: CPT | Performed by: INTERNAL MEDICINE

## 2024-08-11 PROCEDURE — 36415 COLL VENOUS BLD VENIPUNCTURE: CPT

## 2024-08-11 RX ORDER — SODIUM CHLORIDE, SODIUM LACTATE, POTASSIUM CHLORIDE, CALCIUM CHLORIDE 600; 310; 30; 20 MG/100ML; MG/100ML; MG/100ML; MG/100ML
INJECTION, SOLUTION INTRAVENOUS CONTINUOUS
Status: DISCONTINUED | OUTPATIENT
Start: 2024-08-11 | End: 2024-08-11

## 2024-08-11 RX ADMIN — LEVETIRACETAM 1250 MG: 500 TABLET, FILM COATED ORAL at 19:55

## 2024-08-11 RX ADMIN — ENOXAPARIN SODIUM 40 MG: 100 INJECTION SUBCUTANEOUS at 08:34

## 2024-08-11 RX ADMIN — AMLODIPINE BESYLATE 10 MG: 10 TABLET ORAL at 08:34

## 2024-08-11 RX ADMIN — SODIUM CHLORIDE, PRESERVATIVE FREE 10 ML: 5 INJECTION INTRAVENOUS at 19:59

## 2024-08-11 RX ADMIN — MIRTAZAPINE 15 MG: 15 TABLET, FILM COATED ORAL at 19:54

## 2024-08-11 RX ADMIN — POLYVINYL ALCOHOL 1 DROP: 1.4 SOLUTION/ DROPS OPHTHALMIC at 19:59

## 2024-08-11 RX ADMIN — ACETAMINOPHEN 650 MG: 325 TABLET ORAL at 00:33

## 2024-08-11 RX ADMIN — SODIUM CHLORIDE, PRESERVATIVE FREE 10 ML: 5 INJECTION INTRAVENOUS at 19:55

## 2024-08-11 RX ADMIN — LEVETIRACETAM 1250 MG: 500 TABLET, FILM COATED ORAL at 08:34

## 2024-08-11 RX ADMIN — OXCARBAZEPINE 450 MG: 300 TABLET, FILM COATED ORAL at 08:33

## 2024-08-11 RX ADMIN — TAMSULOSIN HYDROCHLORIDE 0.4 MG: 0.4 CAPSULE ORAL at 08:33

## 2024-08-11 RX ADMIN — CIPROFLOXACIN 500 MG: 500 TABLET, FILM COATED ORAL at 08:33

## 2024-08-11 RX ADMIN — SODIUM CHLORIDE, PRESERVATIVE FREE 10 ML: 5 INJECTION INTRAVENOUS at 08:34

## 2024-08-11 RX ADMIN — ACETAMINOPHEN 650 MG: 325 TABLET ORAL at 19:55

## 2024-08-11 RX ADMIN — Medication 10.5 MG: at 19:55

## 2024-08-11 RX ADMIN — SODIUM CHLORIDE, POTASSIUM CHLORIDE, SODIUM LACTATE AND CALCIUM CHLORIDE: 600; 310; 30; 20 INJECTION, SOLUTION INTRAVENOUS at 08:25

## 2024-08-11 RX ADMIN — GABAPENTIN 300 MG: 300 CAPSULE ORAL at 08:33

## 2024-08-11 RX ADMIN — LISINOPRIL 10 MG: 10 TABLET ORAL at 08:34

## 2024-08-11 RX ADMIN — OXCARBAZEPINE 450 MG: 300 TABLET, FILM COATED ORAL at 19:54

## 2024-08-11 RX ADMIN — CIPROFLOXACIN 500 MG: 500 TABLET, FILM COATED ORAL at 19:54

## 2024-08-11 RX ADMIN — PRAVASTATIN SODIUM 40 MG: 40 TABLET ORAL at 08:33

## 2024-08-11 ASSESSMENT — PAIN DESCRIPTION - PAIN TYPE
TYPE: ACUTE PAIN

## 2024-08-11 ASSESSMENT — PAIN - FUNCTIONAL ASSESSMENT
PAIN_FUNCTIONAL_ASSESSMENT: PREVENTS OR INTERFERES SOME ACTIVE ACTIVITIES AND ADLS
PAIN_FUNCTIONAL_ASSESSMENT: PREVENTS OR INTERFERES SOME ACTIVE ACTIVITIES AND ADLS
PAIN_FUNCTIONAL_ASSESSMENT: ACTIVITIES ARE NOT PREVENTED

## 2024-08-11 ASSESSMENT — PAIN DESCRIPTION - ONSET
ONSET: ON-GOING

## 2024-08-11 ASSESSMENT — PAIN SCALES - GENERAL
PAINLEVEL_OUTOF10: 0
PAINLEVEL_OUTOF10: 0
PAINLEVEL_OUTOF10: 4
PAINLEVEL_OUTOF10: 9
PAINLEVEL_OUTOF10: 0
PAINLEVEL_OUTOF10: 4

## 2024-08-11 ASSESSMENT — PAIN DESCRIPTION - DESCRIPTORS
DESCRIPTORS: DISCOMFORT

## 2024-08-11 ASSESSMENT — PAIN DESCRIPTION - FREQUENCY
FREQUENCY: INTERMITTENT

## 2024-08-11 ASSESSMENT — PAIN DESCRIPTION - ORIENTATION
ORIENTATION: LOWER

## 2024-08-11 ASSESSMENT — PAIN DESCRIPTION - LOCATION
LOCATION: ABDOMEN

## 2024-08-11 NOTE — PROGRESS NOTES
Hospitalist Progress Note  Internal Medicine Resident Team      Patient: Ceferino Sanches Jr 78 y.o. male      Unit/Bed: 4A-12/012-A    Admit Date: 8/6/2024      ASSESSMENT AND PLAN  Active Problems    Complicated UTI: Sepsis as below.  Cx: pansensitive pseudomonas. Received 3 days of Zosyn. 10 days total.  -Continue Cipro 500 bid x 7 days (day 3/7)     Seizure-like activity:  Previous hx. Symptoms Blank stare, Started w/ TBI 8 yo. Last seizure ~ 1 year ago. Rapid 8/7 for these symptoms. See's epilepsy specialist in [place]. Follows with neurology as OP. Keppra XR 1250 mg BID at home, EEG sharps in temporal lobe, CT head negative  -seizure precautions  -Keppra 1250mg BID and the trileptal 450mg BID per neurology  -Continue to follow with epilepsy specialist   -No seizures reported overnight     Sepsis: 2/2 UTI.  SIRS 3/4, temp 104.4 max,  max, WBC 25.2 max, lactic acid 4.3, SOFA 4. Today afebrile- Temp 37.9 w/ tylenol. WBC remains elevated 15.4, HR max 115, BC NGTD  -Pt continues on Cipro  -Blood culture in progress will continue to follow    Surgical site bleeding: S/p suprapubic catheter placement 8/6/24. CT A/P no problems in GI or  tracts, hematuria on UA.  -Monitor site for further bleeding, apply dry dressing as needed  -Continue Flomax  -Monitor output    BRENNA on CKD stage II-improved: Creat 1.6, baseline ~1.2.  1.3 today.  -Avoid nephrotoxic agents  -Pharmacy to renally dose medications  -Stopped IVF today after episodes of wheezing  -BMP in a.m.  Carpal tunnel: Patient has symptoms of bilateral carpal tunnel.  Right hand has thenar muscle atrophy.  Positive Tinel's test on both hands.  Patient endorses waking up at night wringing hands.  Endorses weakness, numbness, and tingling in fingertips as well as difficulty lifting things.  -Will attempt to get patient neutral wrist braces however, have not had luck in ordering them.  -Will give referral to [facility].      Resolved Problems    Chronic  with full range of motion. Trachea midline.  No gross JVD appreciated.  Respiratory:  Normal effort. Clear to auscultation, without rales or wheezes or rhonchi.  Cardiovascular: RRR, normal S1/S2 without murmurs, rubs, or gallops  No LE edema  Abdomen: Soft, non-tender, non-distended with normal bowel sounds.  Musculoskeletal: No joint swelling or tenderness. Normal tone. No abnormal movements.   Skin: Warm and dry. No rashes or lesions.  Neurologic:  No focal sensory/motor deficits in the upper or lower extremities. Cranial nerves:  grossly non-focal 2-12.  Thenar atrophy on right hand on physical exam.  Bilateral  weakness 4+ left, 4 right.  Psychiatric: Alert and oriented, normal insight and thought content.  Pleasant and cooperative with exam  Capillary Refill: Brisk,< 3 seconds.  Peripheral Pulses: +2 palpable, equal bilaterally.       Labs/Radiology: See chart or assessment above.     Electronically signed by Tye Diaz DO on 8/11/2024 at 5:32 PM

## 2024-08-11 NOTE — PROCEDURES
PROCEDURE NOTE  Date: 8/11/2024   Name: Ceferino Sanches Jr  YOB: 1945    Procedures EKG completed, given to RN

## 2024-08-12 ENCOUNTER — APPOINTMENT (OUTPATIENT)
Dept: GENERAL RADIOLOGY | Age: 79
DRG: 919 | End: 2024-08-12
Payer: MEDICARE

## 2024-08-12 LAB
ANION GAP SERPL CALC-SCNC: 14 MEQ/L (ref 8–16)
BACTERIA BLD AEROBE CULT: NORMAL
BASOPHILS ABSOLUTE: 0 THOU/MM3 (ref 0–0.1)
BASOPHILS NFR BLD AUTO: 0.4 %
BUN SERPL-MCNC: 13 MG/DL (ref 7–22)
CA-I BLD ISE-SCNC: 1.15 MMOL/L (ref 1.12–1.32)
CALCIUM SERPL-MCNC: 8.4 MG/DL (ref 8.5–10.5)
CHLORIDE 24H UR-SRATE: 98 MEQ/L
CHLORIDE SERPL-SCNC: 109 MEQ/L (ref 98–111)
CO2 SERPL-SCNC: 16 MEQ/L (ref 23–33)
CREAT SERPL-MCNC: 1.1 MG/DL (ref 0.4–1.2)
CREAT UR-MCNC: 141.9 MG/DL
DEPRECATED RDW RBC AUTO: 50 FL (ref 35–45)
EOSINOPHIL NFR BLD AUTO: 4 %
EOSINOPHILS ABSOLUTE: 0.3 THOU/MM3 (ref 0–0.4)
ERYTHROCYTE [DISTWIDTH] IN BLOOD BY AUTOMATED COUNT: 13.8 % (ref 11.5–14.5)
GFR SERPL CREATININE-BSD FRML MDRD: 68 ML/MIN/1.73M2
GLUCOSE SERPL-MCNC: 122 MG/DL (ref 70–108)
HCT VFR BLD AUTO: 30.2 % (ref 42–52)
HGB BLD-MCNC: 9.9 GM/DL (ref 14–18)
IMM GRANULOCYTES # BLD AUTO: 0.05 THOU/MM3 (ref 0–0.07)
IMM GRANULOCYTES NFR BLD AUTO: 0.6 %
LYMPHOCYTES ABSOLUTE: 1.1 THOU/MM3 (ref 1–4.8)
LYMPHOCYTES NFR BLD AUTO: 13.9 %
MAGNESIUM SERPL-MCNC: 2 MG/DL (ref 1.6–2.4)
MCH RBC QN AUTO: 32.8 PG (ref 26–33)
MCHC RBC AUTO-ENTMCNC: 32.8 GM/DL (ref 32.2–35.5)
MCV RBC AUTO: 100 FL (ref 80–94)
MONOCYTES ABSOLUTE: 1 THOU/MM3 (ref 0.4–1.3)
MONOCYTES NFR BLD AUTO: 12.8 %
NEUTROPHILS ABSOLUTE: 5.3 THOU/MM3 (ref 1.8–7.7)
NEUTROPHILS NFR BLD AUTO: 68.3 %
NRBC BLD AUTO-RTO: 0 /100 WBC
NT-PROBNP SERPL IA-MCNC: 3841 PG/ML (ref 0–449)
OSMOLALITY UR: 543 MOSMOL/KG (ref 250–750)
PH UR STRIP.AUTO: 5.5 [PH] (ref 5–9)
PLATELET # BLD AUTO: 166 THOU/MM3 (ref 130–400)
PMV BLD AUTO: 11 FL (ref 9.4–12.4)
POTASSIUM SERPL-SCNC: 3.5 MEQ/L (ref 3.5–5.2)
POTASSIUM UR-SCNC: 28.5 MEQ/L
RBC # BLD AUTO: 3.02 MILL/MM3 (ref 4.7–6.1)
SODIUM SERPL-SCNC: 139 MEQ/L (ref 135–145)
SODIUM UR-SCNC: 80 MEQ/L
UUN 24H UR-MCNC: 622 MG/DL
WBC # BLD AUTO: 7.8 THOU/MM3 (ref 4.8–10.8)

## 2024-08-12 PROCEDURE — 83880 ASSAY OF NATRIURETIC PEPTIDE: CPT

## 2024-08-12 PROCEDURE — 82436 ASSAY OF URINE CHLORIDE: CPT

## 2024-08-12 PROCEDURE — 84300 ASSAY OF URINE SODIUM: CPT

## 2024-08-12 PROCEDURE — 82330 ASSAY OF CALCIUM: CPT

## 2024-08-12 PROCEDURE — 2060000000 HC ICU INTERMEDIATE R&B

## 2024-08-12 PROCEDURE — 2580000003 HC RX 258: Performed by: NURSE PRACTITIONER

## 2024-08-12 PROCEDURE — 81003 URINALYSIS AUTO W/O SCOPE: CPT

## 2024-08-12 PROCEDURE — 6360000002 HC RX W HCPCS

## 2024-08-12 PROCEDURE — 80048 BASIC METABOLIC PNL TOTAL CA: CPT

## 2024-08-12 PROCEDURE — 82570 ASSAY OF URINE CREATININE: CPT

## 2024-08-12 PROCEDURE — 6370000000 HC RX 637 (ALT 250 FOR IP)

## 2024-08-12 PROCEDURE — 74018 RADEX ABDOMEN 1 VIEW: CPT

## 2024-08-12 PROCEDURE — 2580000003 HC RX 258

## 2024-08-12 PROCEDURE — 83735 ASSAY OF MAGNESIUM: CPT

## 2024-08-12 PROCEDURE — 83935 ASSAY OF URINE OSMOLALITY: CPT

## 2024-08-12 PROCEDURE — 6370000000 HC RX 637 (ALT 250 FOR IP): Performed by: STUDENT IN AN ORGANIZED HEALTH CARE EDUCATION/TRAINING PROGRAM

## 2024-08-12 PROCEDURE — 36415 COLL VENOUS BLD VENIPUNCTURE: CPT

## 2024-08-12 PROCEDURE — 84540 ASSAY OF URINE/UREA-N: CPT

## 2024-08-12 PROCEDURE — 6370000000 HC RX 637 (ALT 250 FOR IP): Performed by: NURSE PRACTITIONER

## 2024-08-12 PROCEDURE — 84133 ASSAY OF URINE POTASSIUM: CPT

## 2024-08-12 PROCEDURE — 85025 COMPLETE CBC W/AUTO DIFF WBC: CPT

## 2024-08-12 RX ORDER — POTASSIUM CHLORIDE 20 MEQ/1
40 TABLET, EXTENDED RELEASE ORAL ONCE
Status: COMPLETED | OUTPATIENT
Start: 2024-08-12 | End: 2024-08-12

## 2024-08-12 RX ORDER — PREDNISOLONE ACETATE 10 MG/ML
2 SUSPENSION/ DROPS OPHTHALMIC EVERY 4 HOURS PRN
Status: DISCONTINUED | OUTPATIENT
Start: 2024-08-12 | End: 2024-08-13 | Stop reason: HOSPADM

## 2024-08-12 RX ORDER — HYDROCODONE BITARTRATE AND ACETAMINOPHEN 5; 325 MG/1; MG/1
1 TABLET ORAL EVERY 6 HOURS PRN
Status: DISCONTINUED | OUTPATIENT
Start: 2024-08-12 | End: 2024-08-13 | Stop reason: HOSPADM

## 2024-08-12 RX ORDER — IPRATROPIUM BROMIDE AND ALBUTEROL SULFATE 2.5; .5 MG/3ML; MG/3ML
1 SOLUTION RESPIRATORY (INHALATION) ONCE
Status: DISCONTINUED | OUTPATIENT
Start: 2024-08-12 | End: 2024-08-13 | Stop reason: HOSPADM

## 2024-08-12 RX ADMIN — ENOXAPARIN SODIUM 40 MG: 100 INJECTION SUBCUTANEOUS at 08:23

## 2024-08-12 RX ADMIN — MIRTAZAPINE 15 MG: 15 TABLET, FILM COATED ORAL at 20:37

## 2024-08-12 RX ADMIN — SODIUM CHLORIDE, PRESERVATIVE FREE 10 ML: 5 INJECTION INTRAVENOUS at 08:23

## 2024-08-12 RX ADMIN — TAMSULOSIN HYDROCHLORIDE 0.4 MG: 0.4 CAPSULE ORAL at 08:21

## 2024-08-12 RX ADMIN — HYDROCODONE BITARTRATE AND ACETAMINOPHEN 1 TABLET: 5; 325 TABLET ORAL at 14:43

## 2024-08-12 RX ADMIN — SODIUM CHLORIDE, PRESERVATIVE FREE 20 ML: 5 INJECTION INTRAVENOUS at 20:37

## 2024-08-12 RX ADMIN — AMLODIPINE BESYLATE 10 MG: 10 TABLET ORAL at 08:21

## 2024-08-12 RX ADMIN — LEVETIRACETAM 1250 MG: 500 TABLET, FILM COATED ORAL at 20:37

## 2024-08-12 RX ADMIN — LISINOPRIL 10 MG: 10 TABLET ORAL at 08:22

## 2024-08-12 RX ADMIN — ACETAMINOPHEN 650 MG: 325 TABLET ORAL at 03:16

## 2024-08-12 RX ADMIN — Medication 10.5 MG: at 20:37

## 2024-08-12 RX ADMIN — PRAVASTATIN SODIUM 40 MG: 40 TABLET ORAL at 08:21

## 2024-08-12 RX ADMIN — SODIUM CHLORIDE, PRESERVATIVE FREE 10 ML: 5 INJECTION INTRAVENOUS at 20:38

## 2024-08-12 RX ADMIN — LEVETIRACETAM 1250 MG: 500 TABLET, FILM COATED ORAL at 08:21

## 2024-08-12 RX ADMIN — CIPROFLOXACIN 500 MG: 500 TABLET, FILM COATED ORAL at 20:37

## 2024-08-12 RX ADMIN — POLYVINYL ALCOHOL 1 DROP: 1.4 SOLUTION/ DROPS OPHTHALMIC at 20:37

## 2024-08-12 RX ADMIN — HYDROCODONE BITARTRATE AND ACETAMINOPHEN 1 TABLET: 5; 325 TABLET ORAL at 20:37

## 2024-08-12 RX ADMIN — CIPROFLOXACIN 500 MG: 500 TABLET, FILM COATED ORAL at 08:21

## 2024-08-12 RX ADMIN — OXCARBAZEPINE 450 MG: 300 TABLET, FILM COATED ORAL at 08:21

## 2024-08-12 RX ADMIN — POTASSIUM CHLORIDE 40 MEQ: 1500 TABLET, EXTENDED RELEASE ORAL at 13:34

## 2024-08-12 RX ADMIN — OXCARBAZEPINE 450 MG: 300 TABLET, FILM COATED ORAL at 20:37

## 2024-08-12 RX ADMIN — GABAPENTIN 300 MG: 300 CAPSULE ORAL at 08:21

## 2024-08-12 ASSESSMENT — PAIN DESCRIPTION - PAIN TYPE
TYPE: ACUTE PAIN

## 2024-08-12 ASSESSMENT — PAIN - FUNCTIONAL ASSESSMENT
PAIN_FUNCTIONAL_ASSESSMENT: PREVENTS OR INTERFERES SOME ACTIVE ACTIVITIES AND ADLS

## 2024-08-12 ASSESSMENT — PAIN DESCRIPTION - FREQUENCY
FREQUENCY: INTERMITTENT

## 2024-08-12 ASSESSMENT — PAIN SCALES - GENERAL
PAINLEVEL_OUTOF10: 3
PAINLEVEL_OUTOF10: 4
PAINLEVEL_OUTOF10: 6
PAINLEVEL_OUTOF10: 4
PAINLEVEL_OUTOF10: 3

## 2024-08-12 ASSESSMENT — PAIN DESCRIPTION - ONSET
ONSET: ON-GOING

## 2024-08-12 ASSESSMENT — PAIN DESCRIPTION - LOCATION
LOCATION: ABDOMEN

## 2024-08-12 ASSESSMENT — PAIN DESCRIPTION - ORIENTATION
ORIENTATION: LOWER

## 2024-08-12 ASSESSMENT — PAIN DESCRIPTION - DESCRIPTORS
DESCRIPTORS: DISCOMFORT
DESCRIPTORS: TENDER;STABBING
DESCRIPTORS: DISCOMFORT
DESCRIPTORS: TENDER;STABBING

## 2024-08-12 NOTE — CARE COORDINATION
8/12/24, 2:06 PM EDT    DISCHARGE PLANNING EVALUATION  Met with Ceferino and his sister Daina.  Made them aware Ceferino will not be discharged today.  Daina told SW that she will transport at discharge.  Updated Stella at [facility] of [facility].

## 2024-08-12 NOTE — PROGRESS NOTES
Hospitalist Progress Note  Internal Medicine Resident Team      Patient: Ceferino Sanches Jr 78 y.o. male      Unit/Bed: 4A-12/012-A    Admit Date: 8/6/2024      ASSESSMENT AND PLAN  Active Problems    Complicated UTI: Sepsis as below.  Cx: pansensitive pseudomonas. Received 3 days of Zosyn 10 days total.  -Continue Cipro 500 bid x 7 days (day 4/7)     Seizure-like activity:  Previous hx. Symptoms Blank stare, Started w/ TBI 8 yo. Last seizure ~ 1 year ago. Rapid 8/7 for these symptoms. See's epilepsy specialist in [place]. Follows with neurology as OP. Keppra XR 1250 mg BID at home, EEG sharps in temporal lobe, CT head negative  -seizure precautions  -Keppra 1250mg BID and the trileptal 450mg BID per neurology  -Continue to follow with epilepsy specialist   -No seizures reported overnight    Surgical site bleeding: S/p suprapubic catheter placement 8/6/24. CT A/P no problems in GI or  tracts, hematuria on UA.  -Monitor site for further bleeding, apply dry dressing as needed  -Continue Flomax  -Monitor output    Carpal tunnel: Patient has symptoms of bilateral carpal tunnel.  Right hand has thenar muscle atrophy.  Positive Tinel's test on both hands.  Patient endorses waking up at night wringing hands.  Endorses weakness, numbness, and tingling in fingertips as well as difficulty lifting things.  -Will attempt to get patient neutral wrist braces however, have not had luck in ordering them.  -Will give referral to [facility].      Resolved Problems  Sepsis-resolved: 2/2 UTI.  SIRS 3/4, temp 104.4 max,  max, WBC 25.2 max, lactic acid 4.3, SOFA 4.  Today afebrile-had temp of 38 degrees has been taking Tylenol at least bid, HR remains tachycardic at times max 105, had /76.  WBC WNL.  Continue to follow blood cultures  BRENNA on CKD stage II-resolved: Creat 1.6, baseline ~1.2.  1.1 today.  Continue to monitor  Chronic Conditions (reviewed and stable unless otherwise stated)  Primary hypertension:  questions-likely 2/2 Ativan.  Mentation waxing and waning in the AM.  Stat EEG was negative for epileptiform discharges.  Did reveal right temporal sharps.  Noted to have rectal temperature 104.5.  .  8/7 patient was determined to be septic likely 2/2 UTI found to have gram-negative nonfermenting bacilli and urines.  Had sluggish pupils, normal EKG. CTH wo contrast performed, no focal deficits noted.  Patient determined to have a seizure after having bilateral lower extremity shaking and transferred to . Pt started on Vanc/Zosyn.   8/8: Pt noted to be in SVT -150 on tele. A&Ox4., Temp reduced. Urology saw and stated leave cath in place. Treat infxn. Have follow up with Uro outpt.     Last echo 7/1/2024: EF 55-60%, LV size, wall thickness, wall motion, diastolic function all normal.  Mild AR, MR, TX, trace TR, no stenosis noted, normal RV size and function, patient continues in a pattern of PVCs and bigeminy with irregular beats, K3.6, magnesium yesterday 2.1, calcium slightly decreased at 8.4.  Will replace potassium with 40 mEq, repeat magnesium and test ionized calcium.    Medications:    Infusion Medications    sodium chloride      sodium chloride      Scheduled Medications    ciprofloxacin  500 mg Oral 2 times per day    levETIRAcetam  1,250 mg Oral BID    OXcarbazepine  450 mg Oral BID    sodium chloride flush  5-40 mL IntraVENous 2 times per day    sodium chloride flush  5-40 mL IntraVENous 2 times per day    enoxaparin  40 mg SubCUTAneous Daily    amLODIPine  10 mg Oral Daily    gabapentin  300 mg Oral Daily    polyvinyl alcohol  1 drop Ophthalmic BID    lisinopril  10 mg Oral Daily    melatonin  10.5 mg Oral Nightly    mirtazapine  15 mg Oral Nightly    pravastatin  40 mg Oral Daily    tamsulosin  0.4 mg Oral Daily    PRN Meds: acetaminophen **OR** acetaminophen, sodium chloride flush, sodium chloride, LORazepam, sodium chloride flush, sodium chloride, potassium chloride **OR** potassium

## 2024-08-12 NOTE — CARE COORDINATION
8/12/24, 2:06 PM EDT    DISCHARGE ON GOING EVALUATION    Ceferino Sanches [facility] day: 4  Location: -12/012-A Reason for admit: Bleeding [R58]  Seizure disorder (HCC) [G40.909]  Seizure-like activity (HCC) [R56.9]  Dementia without behavioral disturbance (HCC) [F03.90]     Procedures:   8/7 EEG  8/7 Midline line placement    Imaging since last note: none    Barriers to Discharge: Hgb 9.9, PT/OT, Neurology has signed off. Telemetry, Cipro, Lovenox, Keppra, Zofran prn, electrolyte replacement protocols.     PCP: Jihan Lyon MD  Readmission Risk Score: 23.7    Patient Goals/Plan/Treatment Preferences:   From [facility] [place]. Planning skilled at discharge. SW following. Refer to FARNAZ note.

## 2024-08-12 NOTE — DISCHARGE INSTR - COC
Continuity of Care Form    Patient Name: Ceferino Saini Jr   :  1945  MRN:  [id number]    Admit date:  2024  Discharge date:  24    Code Status Order: Full Code   Advance Directives:   Advance Care Flowsheet Documentation             Admitting Physician:  Tye Diaz DO  PCP: Jihan Lyon MD    Discharging Nurse: KAMI Lopez RN  Discharging Hospital Unit/Room#: 4A-12/012-A  Discharging Unit Phone Number: [phone]    Emergency Contact:   Extended Emergency Contact Information  Primary Emergency Contact: ALLI SAINI  Home Phone: [phone]  Work Phone: [phone]  Mobile Phone: [phone]  Relation: Brother/Sister  Preferred language: English   needed? No  Secondary Emergency Contact: Jeny Saini  Home Phone: [phone]  Mobile Phone: [phone]  Relation: Other    Past Surgical History:  Past Surgical History:   Procedure Laterality Date    CATARACT REMOVAL      HEMORRHOID SURGERY      SUPRAPUBIC CATHETER         Immunization History:   Immunization History   Administered Date(s) Administered    COVID-19, MODERNA, ( formula), (age 12y+), IM, 50mcg/0.5mL 10/11/2023, 2024    COVID-19, PFIZER Bivalent, DO NOT Dilute, (age 12y+), IM, 30 mcg/0.3 mL 10/05/2022    TDaP, ADACEL (age 10y-64y), BOOSTRIX (age 10y+), IM, 0.5mL 2020       Active Problems:  Patient Active Problem List   Diagnosis Code    Tinea pedis of both feet B35.3    Seizure disorder (HCC) G40.909    Primary hypertension I10    Hx of squamous cell carcinoma Z85.89    Generalized anxiety disorder F41.1    Difficulty urinating R39.198    Acute on chronic urinary retention R33.9    BRENNA (acute kidney injury) (HCC) N17.9    Hyperkalemia E87.5    Paresthesias R20.2    Bilateral leg weakness R29.898    Metabolic acidosis E87.20    Falls frequently R29.6    Multiple closed fractures of metatarsal bone of left foot S92.302A    Leukocytosis D72.829    Chronic anemia D64.9    Candiduria B37.49

## 2024-08-12 NOTE — PALLIATIVE CARE
Follow Up / Progress Note        Patient:   Ceferino Sanches Jr  YOB: 1945  Age:  78 y.o.  Room:  [facility]  MRN:  [id number]         Family/Patient Discussion:  Patient up to bathroom at this time.  Did not disturb.      Plan/Follow-Up:  Palliative care remains available and staff may call prn if needs arise.         Electronically signed by Sheryl Nova RN on 8/12/2024 at 11:21 AM             Palliative Care Office: [phone]

## 2024-08-13 ENCOUNTER — APPOINTMENT (OUTPATIENT)
Dept: GENERAL RADIOLOGY | Age: 79
DRG: 919 | End: 2024-08-13
Payer: MEDICARE

## 2024-08-13 VITALS
DIASTOLIC BLOOD PRESSURE: 74 MMHG | BODY MASS INDEX: 28.28 KG/M2 | HEIGHT: 69 IN | HEART RATE: 97 BPM | TEMPERATURE: 97.3 F | RESPIRATION RATE: 18 BRPM | OXYGEN SATURATION: 98 % | WEIGHT: 190.92 LBS | SYSTOLIC BLOOD PRESSURE: 136 MMHG

## 2024-08-13 PROBLEM — G89.18 POST-OP PAIN: Status: ACTIVE | Noted: 2024-08-13

## 2024-08-13 LAB
ALBUMIN SERPL BCG-MCNC: 3.3 G/DL (ref 3.5–5.1)
ALP SERPL-CCNC: 123 U/L (ref 38–126)
ALT SERPL W/O P-5'-P-CCNC: 23 U/L (ref 11–66)
ANION GAP SERPL CALC-SCNC: 12 MEQ/L (ref 8–16)
AST SERPL-CCNC: 22 U/L (ref 5–40)
BASE EXCESS BLDA CALC-SCNC: -3.3 MMOL/L (ref -2–3)
BASOPHILS ABSOLUTE: 0 THOU/MM3 (ref 0–0.1)
BASOPHILS NFR BLD AUTO: 0.7 %
BILIRUB SERPL-MCNC: 0.2 MG/DL (ref 0.3–1.2)
BUN SERPL-MCNC: 13 MG/DL (ref 7–22)
CALCIUM SERPL-MCNC: 8.6 MG/DL (ref 8.5–10.5)
CHLORIDE SERPL-SCNC: 110 MEQ/L (ref 98–111)
CO2 SERPL-SCNC: 20 MEQ/L (ref 23–33)
COLLECTED BY:: ABNORMAL
CREAT SERPL-MCNC: 1.3 MG/DL (ref 0.4–1.2)
DEPRECATED MEAN GLUCOSE BLD GHB EST-ACNC: 90 MG/DL (ref 70–126)
DEPRECATED RDW RBC AUTO: 50.8 FL (ref 35–45)
DEVICE: ABNORMAL
EOSINOPHIL NFR BLD AUTO: 5.1 %
EOSINOPHILS ABSOLUTE: 0.3 THOU/MM3 (ref 0–0.4)
ERYTHROCYTE [DISTWIDTH] IN BLOOD BY AUTOMATED COUNT: 13.8 % (ref 11.5–14.5)
FIO2 ON VENT O2 ANALYZER: 21 %
GFR SERPL CREATININE-BSD FRML MDRD: 56 ML/MIN/1.73M2
GLUCOSE SERPL-MCNC: 106 MG/DL (ref 70–108)
HBA1C MFR BLD HPLC: 5 % (ref 4.4–6.4)
HCO3 BLDA-SCNC: 21 MMOL/L (ref 23–28)
HCT VFR BLD AUTO: 32.8 % (ref 42–52)
HGB BLD-MCNC: 10.8 GM/DL (ref 14–18)
IMM GRANULOCYTES # BLD AUTO: 0.03 THOU/MM3 (ref 0–0.07)
IMM GRANULOCYTES NFR BLD AUTO: 0.5 %
LYMPHOCYTES ABSOLUTE: 1.2 THOU/MM3 (ref 1–4.8)
LYMPHOCYTES NFR BLD AUTO: 20.8 %
MCH RBC QN AUTO: 32.9 PG (ref 26–33)
MCHC RBC AUTO-ENTMCNC: 32.9 GM/DL (ref 32.2–35.5)
MCV RBC AUTO: 100 FL (ref 80–94)
MONOCYTES ABSOLUTE: 0.9 THOU/MM3 (ref 0.4–1.3)
MONOCYTES NFR BLD AUTO: 15.2 %
NEUTROPHILS ABSOLUTE: 3.4 THOU/MM3 (ref 1.8–7.7)
NEUTROPHILS NFR BLD AUTO: 57.7 %
NRBC BLD AUTO-RTO: 0 /100 WBC
OSMOLALITY SERPL: 295 MOSMOL/KG (ref 275–295)
PCO2 TEMP ADJ BLDMV: 34 MMHG (ref 41–51)
PH BLDMV: 7.4 [PH] (ref 7.31–7.41)
PLATELET # BLD AUTO: 195 THOU/MM3 (ref 130–400)
PMV BLD AUTO: 10.6 FL (ref 9.4–12.4)
PO2 BLDMV: 54 MMHG (ref 25–40)
POTASSIUM SERPL-SCNC: 3.9 MEQ/L (ref 3.5–5.2)
PROT SERPL-MCNC: 6.5 G/DL (ref 6.1–8)
RBC # BLD AUTO: 3.28 MILL/MM3 (ref 4.7–6.1)
SAO2 % BLDMV: 88 %
SITE: ABNORMAL
SODIUM SERPL-SCNC: 142 MEQ/L (ref 135–145)
VENTILATION MODE VENT: ABNORMAL
WBC # BLD AUTO: 5.9 THOU/MM3 (ref 4.8–10.8)

## 2024-08-13 PROCEDURE — 71045 X-RAY EXAM CHEST 1 VIEW: CPT

## 2024-08-13 PROCEDURE — 82803 BLOOD GASES ANY COMBINATION: CPT

## 2024-08-13 PROCEDURE — 6360000002 HC RX W HCPCS

## 2024-08-13 PROCEDURE — 85025 COMPLETE CBC W/AUTO DIFF WBC: CPT

## 2024-08-13 PROCEDURE — 6370000000 HC RX 637 (ALT 250 FOR IP): Performed by: NURSE PRACTITIONER

## 2024-08-13 PROCEDURE — 6370000000 HC RX 637 (ALT 250 FOR IP)

## 2024-08-13 PROCEDURE — 36415 COLL VENOUS BLD VENIPUNCTURE: CPT

## 2024-08-13 PROCEDURE — 83036 HEMOGLOBIN GLYCOSYLATED A1C: CPT

## 2024-08-13 PROCEDURE — 94762 N-INVAS EAR/PLS OXIMTRY CONT: CPT

## 2024-08-13 PROCEDURE — 80053 COMPREHEN METABOLIC PANEL: CPT

## 2024-08-13 PROCEDURE — 83930 ASSAY OF BLOOD OSMOLALITY: CPT

## 2024-08-13 PROCEDURE — 2580000003 HC RX 258: Performed by: NURSE PRACTITIONER

## 2024-08-13 PROCEDURE — 6370000000 HC RX 637 (ALT 250 FOR IP): Performed by: STUDENT IN AN ORGANIZED HEALTH CARE EDUCATION/TRAINING PROGRAM

## 2024-08-13 RX ORDER — CIPROFLOXACIN 500 MG/1
500 TABLET, FILM COATED ORAL EVERY 12 HOURS SCHEDULED
Qty: 6 TABLET | Refills: 0 | Status: SHIPPED | OUTPATIENT
Start: 2024-08-13 | End: 2024-08-16

## 2024-08-13 RX ORDER — HYDROCODONE BITARTRATE AND ACETAMINOPHEN 5; 325 MG/1; MG/1
1 TABLET ORAL EVERY 6 HOURS PRN
Qty: 12 TABLET | Refills: 0 | Status: SHIPPED | OUTPATIENT
Start: 2024-08-13 | End: 2024-08-16

## 2024-08-13 RX ADMIN — TAMSULOSIN HYDROCHLORIDE 0.4 MG: 0.4 CAPSULE ORAL at 09:18

## 2024-08-13 RX ADMIN — OXCARBAZEPINE 450 MG: 300 TABLET, FILM COATED ORAL at 09:17

## 2024-08-13 RX ADMIN — CIPROFLOXACIN 500 MG: 500 TABLET, FILM COATED ORAL at 09:17

## 2024-08-13 RX ADMIN — LISINOPRIL 10 MG: 10 TABLET ORAL at 09:17

## 2024-08-13 RX ADMIN — ENOXAPARIN SODIUM 40 MG: 100 INJECTION SUBCUTANEOUS at 09:22

## 2024-08-13 RX ADMIN — AMLODIPINE BESYLATE 10 MG: 10 TABLET ORAL at 09:16

## 2024-08-13 RX ADMIN — LEVETIRACETAM 1250 MG: 500 TABLET, FILM COATED ORAL at 09:17

## 2024-08-13 RX ADMIN — PRAVASTATIN SODIUM 40 MG: 40 TABLET ORAL at 09:18

## 2024-08-13 RX ADMIN — SODIUM CHLORIDE, PRESERVATIVE FREE 10 ML: 5 INJECTION INTRAVENOUS at 09:18

## 2024-08-13 RX ADMIN — GABAPENTIN 300 MG: 300 CAPSULE ORAL at 09:17

## 2024-08-13 ASSESSMENT — PAIN DESCRIPTION - ORIENTATION: ORIENTATION: LOWER

## 2024-08-13 ASSESSMENT — PAIN DESCRIPTION - PAIN TYPE: TYPE: ACUTE PAIN

## 2024-08-13 ASSESSMENT — PAIN DESCRIPTION - FREQUENCY: FREQUENCY: INTERMITTENT

## 2024-08-13 ASSESSMENT — PAIN - FUNCTIONAL ASSESSMENT: PAIN_FUNCTIONAL_ASSESSMENT: PREVENTS OR INTERFERES SOME ACTIVE ACTIVITIES AND ADLS

## 2024-08-13 ASSESSMENT — PAIN DESCRIPTION - ONSET: ONSET: ON-GOING

## 2024-08-13 ASSESSMENT — PAIN SCALES - GENERAL: PAINLEVEL_OUTOF10: 2

## 2024-08-13 ASSESSMENT — PAIN DESCRIPTION - DESCRIPTORS: DESCRIPTORS: TENDER

## 2024-08-13 ASSESSMENT — PAIN DESCRIPTION - LOCATION: LOCATION: ABDOMEN

## 2024-08-13 NOTE — FLOWSHEET NOTE
08/12/24 2119   Treatment Team Notification   Reason for Communication Evaluate  (SBP in 160s, slight wheezing, and +1 pitting edema BLE)   Name of Team Member Notified Little Villanueva   Treatment Team Role Resident   Method of Communication Secure Message   Response See orders   Notification Time 2119

## 2024-08-13 NOTE — DISCHARGE INSTR - DIET

## 2024-08-13 NOTE — PROGRESS NOTES
Pt was alone at the t   08/13/24 1405   Encounter Summary   Service Provided For Patient   Referral/Consult From Rounding   Support System Family members   Last Encounter  08/13/24   Complexity of Encounter Low   Begin Time 0940   End Time  0945   Total Time Calculated 5 min   Spiritual/Emotional needs   Type Spiritual Support   Assessment/Intervention/Outcome   Assessment Hopeful   Intervention Empowerment   Outcome Encouraged     slava of the visit. He was dealing with seizure-like activities. He was encouraged and blessed.

## 2024-08-13 NOTE — PROGRESS NOTES
Discharge teaching and instructions for diagnosis/procedure of sepsis completed with patient using teachback method. AVS reviewed. Printed prescriptions given to patient. Patient voiced understanding regarding prescriptions, follow up appointments, and care of self at home. Discharged in a wheelchair to  skilled nursing per family.

## 2024-08-13 NOTE — PROGRESS NOTES
Physician Progress Note      PATIENT:               FILI SAINI JR  CSN #:                  [id number]  :                       1945  ADMIT DATE:       2024 6:35 PM  DISCH DATE:        2024 1:00 AM  RESPONDING  PROVIDER #:        Raul Forte CNP          QUERY TEXT:    Pt admitted () with seizure, bleeding from surgical site. Pt noted to have   Uti documented from culture collected on  at 02:22  . If possible, please   make selection below, document in progress notes and discharge summary the   present on admission status of UTI :    The medical record reflects the following:  Risk Factors: Supra pubic catheter placed () prior to admission for urinary   retention  Clinical Indicators: UA - moderate, cloudy, no bacteria ,large blood , wbc   25-50 Urine culture showing -Pseudomonas aeruginosa. On admission patient   heart rate >90, lactic acid 1.4, 4.3 , WBC 9.5, 7.6, 25.2  Treatment: admission ,imaging , labs, urology consult, IV antibiotics Zosyn ,   vancomycin    Thank you,  Rissa GRANADOS, RN, CRCR  Clinical   P: [phone]  F: [phone]  Options provided:  -- Yes, UTI  was present at the time of the order to admit to the hospital  -- No, UTI was not present on admission and developed during the inpatient   stay  -- Other - I will add my own diagnosis  -- Disagree - Not applicable / Not valid  -- Disagree - Clinically unable to determine / Unknown  -- Refer to Clinical Documentation Reviewer    PROVIDER RESPONSE TEXT:    Yes, UTI was present at the time of the order to admit to the hospital.    Query created by: Rissa Gudino on 2024 3:08 PM      Electronically signed by:  Raul Forte CNP 2024 3:50 PM

## 2024-08-13 NOTE — PLAN OF CARE
Problem: Discharge Planning  Goal: Discharge to home or other facility with appropriate resources  8/12/2024 2257 by Kristine Crump RN  Outcome: Progressing  Flowsheets (Taken 8/12/2024 2257)  Discharge to home or other facility with appropriate resources:   Identify barriers to discharge with patient and caregiver   Arrange for needed discharge resources and transportation as appropriate   Identify discharge learning needs (meds, wound care, etc)     Problem: Pain  Goal: Verbalizes/displays adequate comfort level or baseline comfort level  8/12/2024 2257 by Kristine Crump RN  Outcome: Progressing  Flowsheets (Taken 8/12/2024 2257)  Verbalizes/displays adequate comfort level or baseline comfort level:   Encourage patient to monitor pain and request assistance   Assess pain using appropriate pain scale   Administer analgesics based on type and severity of pain and evaluate response   Implement non-pharmacological measures as appropriate and evaluate response   Consider cultural and social influences on pain and pain management   Notify Licensed Independent Practitioner if interventions unsuccessful or patient reports new pain     Problem: Safety - Adult  Goal: Free from fall injury  8/12/2024 2257 by Kristine Crump RN  Outcome: Progressing  Flowsheets (Taken 8/12/2024 2257)  Free From Fall Injury:   Instruct family/caregiver on patient safety   Based on caregiver fall risk screen, instruct family/caregiver to ask for assistance with transferring infant if caregiver noted to have fall risk factors     Problem: ABCDS Injury Assessment  Goal: Absence of physical injury  8/12/2024 2257 by Kristine Crump, RN  Outcome: Progressing  Flowsheets (Taken 8/12/2024 2257)  Absence of Physical Injury: Implement safety measures based on patient assessment     Problem: Skin/Tissue Integrity  Goal: Absence of new skin breakdown  Description: 1.  Monitor for areas of redness and/or skin breakdown  2.  Assess vascular access sites 
  Problem: Discharge Planning  Goal: Discharge to home or other facility with appropriate resources  8/8/2024 1505 by Joanie Bah LSW  Outcome: Progressing     Problem: Pain  Goal: Verbalizes/displays adequate comfort level or baseline comfort level  Outcome: Progressing  Flowsheets (Taken 8/8/2024 2149)  Verbalizes/displays adequate comfort level or baseline comfort level:   Encourage patient to monitor pain and request assistance   Assess pain using appropriate pain scale   Administer analgesics based on type and severity of pain and evaluate response   Implement non-pharmacological measures as appropriate and evaluate response   Consider cultural and social influences on pain and pain management   Notify Licensed Independent Practitioner if interventions unsuccessful or patient reports new pain     Problem: Safety - Adult  Goal: Free from fall injury  Outcome: Progressing  Flowsheets (Taken 8/8/2024 2149)  Free From Fall Injury: Instruct family/caregiver on patient safety     Problem: ABCDS Injury Assessment  Goal: Absence of physical injury  Outcome: Progressing  Flowsheets (Taken 8/8/2024 2149)  Absence of Physical Injury: Implement safety measures based on patient assessment     Problem: Skin/Tissue Integrity  Goal: Absence of new skin breakdown  Description: 1.  Monitor for areas of redness and/or skin breakdown  2.  Assess vascular access sites hourly  3.  Every 4-6 hours minimum:  Change oxygen saturation probe site  4.  Every 4-6 hours:  If on nasal continuous positive airway pressure, respiratory therapy assess nares and determine need for appliance change or resting period.  Outcome: Progressing     Problem: Skin/Tissue Integrity - Adult  Goal: Skin integrity remains intact  Outcome: Progressing  Flowsheets (Taken 8/8/2024 2149)  Skin Integrity Remains Intact:   Monitor for areas of redness and/or skin breakdown   Assess vascular access sites hourly     Problem: Gastrointestinal - Adult  Goal: 
  Problem: Discharge Planning  Goal: Discharge to home or other facility with appropriate resources  Outcome: Progressing       Consult received. Please see SW note dated 8/08.   
  Problem: Discharge Planning  Goal: Discharge to home or other facility with appropriate resources  Outcome: Progressing  Flowsheets (Taken 8/11/2024 2147)  Discharge to home or other facility with appropriate resources:   Identify barriers to discharge with patient and caregiver   Arrange for needed discharge resources and transportation as appropriate     Problem: Pain  Goal: Verbalizes/displays adequate comfort level or baseline comfort level  Outcome: Progressing  Flowsheets (Taken 8/11/2024 2147)  Verbalizes/displays adequate comfort level or baseline comfort level:   Encourage patient to monitor pain and request assistance   Assess pain using appropriate pain scale   Administer analgesics based on type and severity of pain and evaluate response   Implement non-pharmacological measures as appropriate and evaluate response   Consider cultural and social influences on pain and pain management   Notify Licensed Independent Practitioner if interventions unsuccessful or patient reports new pain     Problem: Safety - Adult  Goal: Free from fall injury  Outcome: Progressing  Flowsheets (Taken 8/11/2024 2147)  Free From Fall Injury:   Instruct family/caregiver on patient safety   Based on caregiver fall risk screen, instruct family/caregiver to ask for assistance with transferring infant if caregiver noted to have fall risk factors     Problem: ABCDS Injury Assessment  Goal: Absence of physical injury  Outcome: Progressing  Flowsheets (Taken 8/11/2024 2147)  Absence of Physical Injury: Implement safety measures based on patient assessment     Problem: Skin/Tissue Integrity  Goal: Absence of new skin breakdown  Description: 1.  Monitor for areas of redness and/or skin breakdown  2.  Assess vascular access sites hourly  3.  Every 4-6 hours minimum:  Change oxygen saturation probe site  4.  Every 4-6 hours:  If on nasal continuous positive airway pressure, respiratory therapy assess nares and determine need for appliance 
  Problem: Discharge Planning  Goal: Discharge to home or other facility with appropriate resources  Outcome: Progressing  Flowsheets (Taken 8/7/2024 0647)  Discharge to home or other facility with appropriate resources:   Identify barriers to discharge with patient and caregiver   Arrange for needed discharge resources and transportation as appropriate     Problem: Pain  Goal: Verbalizes/displays adequate comfort level or baseline comfort level  Outcome: Progressing  Flowsheets (Taken 8/7/2024 0647)  Verbalizes/displays adequate comfort level or baseline comfort level:   Encourage patient to monitor pain and request assistance   Assess pain using appropriate pain scale   Implement non-pharmacological measures as appropriate and evaluate response   Administer analgesics based on type and severity of pain and evaluate response     Problem: Safety - Adult  Goal: Free from fall injury  Outcome: Progressing  Flowsheets (Taken 8/7/2024 0647)  Free From Fall Injury: Instruct family/caregiver on patient safety     Problem: ABCDS Injury Assessment  Goal: Absence of physical injury  Outcome: Progressing  Flowsheets (Taken 8/7/2024 0647)  Absence of Physical Injury: Implement safety measures based on patient assessment     Problem: Skin/Tissue Integrity  Goal: Absence of new skin breakdown  Description: 1.  Monitor for areas of redness and/or skin breakdown  2.  Assess vascular access sites hourly  3.  Every 4-6 hours minimum:  Change oxygen saturation probe site  4.  Every 4-6 hours:  If on nasal continuous positive airway pressure, respiratory therapy assess nares and determine need for appliance change or resting period.  Outcome: Progressing  Note: Full skin assessment performed on admission.      Problem: Skin/Tissue Integrity - Adult  Goal: Skin integrity remains intact  Outcome: Progressing  Flowsheets (Taken 8/7/2024 0647)  Skin Integrity Remains Intact:   Monitor for areas of redness and/or skin breakdown   Assess 
Care plan reviewed with patient and family.  Patient and family verbalize understanding of the plan of care and contribute to goal setting.   Problem: Discharge Planning  Goal: Discharge to home or other facility with appropriate resources  8/13/2024 0746 by Stella Lopez RN  Outcome: Progressing  8/12/2024 2257 by Kristine Crump RN  Outcome: Progressing  Flowsheets (Taken 8/12/2024 2257)  Discharge to home or other facility with appropriate resources:   Identify barriers to discharge with patient and caregiver   Arrange for needed discharge resources and transportation as appropriate   Identify discharge learning needs (meds, wound care, etc)     Problem: Pain  Goal: Verbalizes/displays adequate comfort level or baseline comfort level  8/13/2024 0746 by Stella Lopez RN  Outcome: Progressing  8/12/2024 2257 by Kristine Crump RN  Outcome: Progressing  Flowsheets (Taken 8/12/2024 2257)  Verbalizes/displays adequate comfort level or baseline comfort level:   Encourage patient to monitor pain and request assistance   Assess pain using appropriate pain scale   Administer analgesics based on type and severity of pain and evaluate response   Implement non-pharmacological measures as appropriate and evaluate response   Consider cultural and social influences on pain and pain management   Notify Licensed Independent Practitioner if interventions unsuccessful or patient reports new pain     Problem: Safety - Adult  Goal: Free from fall injury  8/13/2024 0746 by Stella Lopez RN  Outcome: Progressing  8/12/2024 2257 by Kristine Crump RN  Outcome: Progressing  Flowsheets (Taken 8/12/2024 2257)  Free From Fall Injury:   Instruct family/caregiver on patient safety   Based on caregiver fall risk screen, instruct family/caregiver to ask for assistance with transferring infant if caregiver noted to have fall risk factors     Problem: ABCDS Injury Assessment  Goal: Absence of physical injury  8/13/2024 0746 by John 
Care plan reviewed with patient and family.  Patient and family verbalize understanding of the plan of care and contribute to goal setting.   Problem: Discharge Planning  Goal: Discharge to home or other facility with appropriate resources  Outcome: Progressing     Problem: Pain  Goal: Verbalizes/displays adequate comfort level or baseline comfort level  Outcome: Progressing     Problem: Safety - Adult  Goal: Free from fall injury  Outcome: Progressing     Problem: ABCDS Injury Assessment  Goal: Absence of physical injury  Outcome: Progressing     Problem: Skin/Tissue Integrity  Goal: Absence of new skin breakdown  Description: 1.  Monitor for areas of redness and/or skin breakdown  2.  Assess vascular access sites hourly  3.  Every 4-6 hours minimum:  Change oxygen saturation probe site  4.  Every 4-6 hours:  If on nasal continuous positive airway pressure, respiratory therapy assess nares and determine need for appliance change or resting period.  Outcome: Progressing     Problem: Skin/Tissue Integrity - Adult  Goal: Skin integrity remains intact  Outcome: Progressing     Problem: Gastrointestinal - Adult  Goal: Maintains or returns to baseline bowel function  Outcome: Progressing     Problem: Infection - Adult  Goal: Absence of infection at discharge  Outcome: Progressing     Problem: Metabolic/Fluid and Electrolytes - Adult  Goal: Electrolytes maintained within normal limits  Outcome: Progressing     Problem: Hematologic - Adult  Goal: Maintains hematologic stability  Outcome: Progressing     Problem: Neurosensory - Adult  Goal: Absence of seizures  Outcome: Progressing     
Just went to reevaluate patient, IV team getting ready to place a second line, patient is not near as warm to the touch, heart rate is in the low 130's; difficulty getting blood so we will obtain after second IV line is placed with the ability to draw blood labs will be sent, KUB is pending; discussed with primary RN  
change or resting period.  Outcome: Progressing     Problem: Skin/Tissue Integrity - Adult  Goal: Skin integrity remains intact  Outcome: Progressing  Flowsheets (Taken 8/10/2024 2149)  Skin Integrity Remains Intact:   Monitor for areas of redness and/or skin breakdown   Assess vascular access sites hourly     Problem: Gastrointestinal - Adult  Goal: Maintains or returns to baseline bowel function  Outcome: Progressing  Flowsheets (Taken 8/10/2024 2149)  Maintains or returns to baseline bowel function:   Assess bowel function   Encourage oral fluids to ensure adequate hydration     Problem: Infection - Adult  Goal: Absence of infection at discharge  Outcome: Progressing  Flowsheets (Taken 8/10/2024 2149)  Absence of infection at discharge:   Assess and monitor for signs and symptoms of infection   Monitor lab/diagnostic results     Problem: Metabolic/Fluid and Electrolytes - Adult  Goal: Electrolytes maintained within normal limits  Outcome: Progressing  Flowsheets (Taken 8/10/2024 2149)  Electrolytes maintained within normal limits:   Monitor labs and assess patient for signs and symptoms of electrolyte imbalances   Administer electrolyte replacement as ordered     Problem: Hematologic - Adult  Goal: Maintains hematologic stability  Outcome: Progressing  Flowsheets (Taken 8/10/2024 2149)  Maintains hematologic stability: Assess for signs and symptoms of bleeding or hemorrhage     Problem: Neurosensory - Adult  Goal: Absence of seizures  Outcome: Progressing  Flowsheets (Taken 8/10/2024 2149)  Absence of seizures: Monitor for seizure activity.  If seizure occurs, document type and location of movements and any associated apnea     
symptoms of electrolyte imbalances   Administer electrolyte replacement as ordered   Monitor response to electrolyte replacements, including repeat lab results as appropriate     Problem: Hematologic - Adult  Goal: Maintains hematologic stability  8/9/2024 1130 by Yoon Stein RN  Outcome: Progressing  Flowsheets (Taken 8/8/2024 2149 by Kristine Crump, RN)  Maintains hematologic stability:   Assess for signs and symptoms of bleeding or hemorrhage   Monitor labs for bleeding or clotting disorders  8/8/2024 2149 by Kristine Crump, RN  Outcome: Progressing  Flowsheets (Taken 8/8/2024 2149)  Maintains hematologic stability:   Assess for signs and symptoms of bleeding or hemorrhage   Monitor labs for bleeding or clotting disorders     Problem: Neurosensory - Adult  Goal: Absence of seizures  8/9/2024 1130 by Yoon Stein RN  Outcome: Progressing  Flowsheets (Taken 8/8/2024 2149 by Kristine Crump, RN)  Absence of seizures:   Monitor for seizure activity.  If seizure occurs, document type and location of movements and any associated apnea   If seizure occurs, turn head to side and suction secretions as needed  8/8/2024 2149 by Kristine Crump, RN  Outcome: Progressing  Flowsheets (Taken 8/8/2024 2149)  Absence of seizures:   Monitor for seizure activity.  If seizure occurs, document type and location of movements and any associated apnea   If seizure occurs, turn head to side and suction secretions as needed   Careplan reviewed with patient and family. Patient and family verbalized understanding of the plan of care.

## 2024-08-13 NOTE — CARE COORDINATION
8/13/24, 11:33 AM EDT    DISCHARGE PLANNING EVALUATION    Attempted to reach sister Daina as she plans on providing transport for Skip to the F.  She did not answer and it did not go to [facility].  Will try again.      8/13/24, 12:38 PM EDT    Patient goals/plan/ treatment preferences discussed by  and .  Patient goals/plan/ treatment preferences reviewed with patient/ family.  Patient/ family verbalize understanding of discharge plan and are in agreement with goal/plan/treatment preferences.  Understanding was demonstrated using the teach back method.  AVS provided by RN at time of discharge, which includes all necessary medical information pertaining to the patients current course of illness, treatment, post-discharge goals of care, and treatment preferences.     Services At/After Discharge: Skilled Nursing Facility (SNF), Aide services, Nursing service, OT, and PT       Ceferino will be discharged to [facility] skilled unit today.  He will be skilled under his Medicare benefit.  He will be transported his sister.   Discharge instructions are attached to blue discharge packet.  Patient and family are agreeable to discharge plan.  Spoke with Stella at the facility and they are ready to accept.

## 2024-08-15 LAB — BACTERIA BLD AEROBE CULT: NORMAL

## 2024-08-15 ASSESSMENT — ENCOUNTER SYMPTOMS
NAUSEA: 0
COUGH: 0
RHINORRHEA: 0
SORE THROAT: 0
CONSTIPATION: 0
WHEEZING: 0
SHORTNESS OF BREATH: 0
EYE REDNESS: 0
VOMITING: 0

## 2024-08-15 NOTE — PROGRESS NOTES
normal.       /80   Pulse 84   Temp 98.1 °F (36.7 °C)   Resp 14   Wt 87.1 kg (192 lb)   SpO2 96%   BMI 28.35 kg/m²     Assessment/Plan   Assessment & Plan   1. Pseudomonas urinary tract infection - His UTI was treated with Zosyn and Vanomycin in the hospital and he was d/c on oral Cipro which he will complete.  Monitor for signs of infection.  Therapy to evaluate and treat for weakness/deconditioning related to his UTI.   2. Suprapubic catheter ([facility]) - He had bleeding around his catheter site but no obvious source was found.  Continue to monitor site for infection.   3. Seizure disorder ([facility]) - He denies any further seizure-like episodes since his discharge so will continue on Keppra and Apitom and monitor closely.  Follow up with neurology as directed.   4. Stage 3 chronic kidney disease, unspecified whether stage 3a or 3b CKD ([facility]) - His creatinine bumped to 1.6 during his hospitalization but improved to 1.1 on d/c.  Repeat BMP next week.   5. Iron deficiency anemia due to chronic blood loss - He has a h/o iron deficiency anemia but his hemoglobin has remained stable.  Will check a CBC with his BMP next week.   6. Mixed hyperlipidemia - This is chronic so continue on statin and a healthy diet.   7. Essential hypertension - Continue on Lisinopril and monitor BP closely.   8. Generalized anxiety disorder - His mood has been stable so continue on Remeron.   9. Blindness of left eye with normal vision in contralateral eye - Continue supportive care.   10. Chronic low back pain with bilateral sciatica, unspecified back pain laterality - Continue with Norco, Gabapentin, and Flexeril for pain control.   11. Insomnia, unspecified type - Continue on Remeron and monitor for symptoms.     Patient seen and examined.  History partially obtained by chart review and nursing notes. I have reviewed patient's past medical, surgical, social, and family history and have made updates where appropriate.  See facility EMR

## 2024-08-16 ENCOUNTER — OUTSIDE SERVICES (OUTPATIENT)
Dept: FAMILY MEDICINE CLINIC | Age: 79
End: 2024-08-16

## 2024-08-16 VITALS
BODY MASS INDEX: 28.35 KG/M2 | HEART RATE: 84 BPM | WEIGHT: 192 LBS | TEMPERATURE: 98.1 F | RESPIRATION RATE: 14 BRPM | SYSTOLIC BLOOD PRESSURE: 122 MMHG | DIASTOLIC BLOOD PRESSURE: 80 MMHG | OXYGEN SATURATION: 96 %

## 2024-08-16 DIAGNOSIS — E78.2 MIXED HYPERLIPIDEMIA: ICD-10-CM

## 2024-08-16 DIAGNOSIS — N39.0 PSEUDOMONAS URINARY TRACT INFECTION: Primary | ICD-10-CM

## 2024-08-16 DIAGNOSIS — I10 ESSENTIAL HYPERTENSION: ICD-10-CM

## 2024-08-16 DIAGNOSIS — G40.909 SEIZURE DISORDER (HCC): ICD-10-CM

## 2024-08-16 DIAGNOSIS — B96.5 PSEUDOMONAS URINARY TRACT INFECTION: Primary | ICD-10-CM

## 2024-08-16 DIAGNOSIS — Z93.59 SUPRAPUBIC CATHETER (HCC): ICD-10-CM

## 2024-08-16 DIAGNOSIS — M54.41 CHRONIC LOW BACK PAIN WITH BILATERAL SCIATICA, UNSPECIFIED BACK PAIN LATERALITY: ICD-10-CM

## 2024-08-16 DIAGNOSIS — H54.40 BLINDNESS OF LEFT EYE WITH NORMAL VISION IN CONTRALATERAL EYE: ICD-10-CM

## 2024-08-16 DIAGNOSIS — F41.1 GENERALIZED ANXIETY DISORDER: ICD-10-CM

## 2024-08-16 DIAGNOSIS — M54.42 CHRONIC LOW BACK PAIN WITH BILATERAL SCIATICA, UNSPECIFIED BACK PAIN LATERALITY: ICD-10-CM

## 2024-08-16 DIAGNOSIS — D50.0 IRON DEFICIENCY ANEMIA DUE TO CHRONIC BLOOD LOSS: ICD-10-CM

## 2024-08-16 DIAGNOSIS — G89.29 CHRONIC LOW BACK PAIN WITH BILATERAL SCIATICA, UNSPECIFIED BACK PAIN LATERALITY: ICD-10-CM

## 2024-08-16 DIAGNOSIS — N18.30 STAGE 3 CHRONIC KIDNEY DISEASE, UNSPECIFIED WHETHER STAGE 3A OR 3B CKD (HCC): ICD-10-CM

## 2024-08-16 DIAGNOSIS — G47.00 INSOMNIA, UNSPECIFIED TYPE: ICD-10-CM

## 2024-08-16 ASSESSMENT — ENCOUNTER SYMPTOMS: DIARRHEA: 1

## 2024-08-18 ENCOUNTER — HOSPITAL ENCOUNTER (EMERGENCY)
Age: 79
Discharge: HOME | End: 2024-08-18
Attending: EMERGENCY MEDICINE
Payer: MEDICARE

## 2024-08-18 ENCOUNTER — APPOINTMENT (OUTPATIENT)
Dept: CT IMAGING | Age: 79
End: 2024-08-18
Payer: MEDICARE

## 2024-08-18 VITALS
BODY MASS INDEX: 28.44 KG/M2 | OXYGEN SATURATION: 98 % | TEMPERATURE: 98.3 F | HEIGHT: 69 IN | WEIGHT: 192 LBS | HEART RATE: 99 BPM | DIASTOLIC BLOOD PRESSURE: 72 MMHG | RESPIRATION RATE: 16 BRPM | SYSTOLIC BLOOD PRESSURE: 164 MMHG

## 2024-08-18 DIAGNOSIS — Z43.5 ENCOUNTER FOR SUPRAPUBIC CATHETER CARE (HCC): Primary | ICD-10-CM

## 2024-08-18 LAB
ANION GAP SERPL CALC-SCNC: 13 MEQ/L (ref 8–16)
BASOPHILS ABSOLUTE: 0 THOU/MM3 (ref 0–0.1)
BASOPHILS NFR BLD AUTO: 0.5 %
BUN SERPL-MCNC: 10 MG/DL (ref 7–22)
CALCIUM SERPL-MCNC: 8.7 MG/DL (ref 8.5–10.5)
CHLORIDE SERPL-SCNC: 105 MEQ/L (ref 98–111)
CO2 SERPL-SCNC: 23 MEQ/L (ref 23–33)
CREAT SERPL-MCNC: 1 MG/DL (ref 0.4–1.2)
DEPRECATED RDW RBC AUTO: 47.8 FL (ref 35–45)
EOSINOPHIL NFR BLD AUTO: 1.8 %
EOSINOPHILS ABSOLUTE: 0.1 THOU/MM3 (ref 0–0.4)
ERYTHROCYTE [DISTWIDTH] IN BLOOD BY AUTOMATED COUNT: 13.4 % (ref 11.5–14.5)
GFR SERPL CREATININE-BSD FRML MDRD: 77 ML/MIN/1.73M2
GLUCOSE SERPL-MCNC: 114 MG/DL (ref 70–108)
HCT VFR BLD AUTO: 30.8 % (ref 42–52)
HGB BLD-MCNC: 10 GM/DL (ref 14–18)
IMM GRANULOCYTES # BLD AUTO: 0.02 THOU/MM3 (ref 0–0.07)
IMM GRANULOCYTES NFR BLD AUTO: 0.3 %
LIPASE SERPL-CCNC: 27.2 U/L (ref 5.6–51.3)
LYMPHOCYTES ABSOLUTE: 0.7 THOU/MM3 (ref 1–4.8)
LYMPHOCYTES NFR BLD AUTO: 11.6 %
MCH RBC QN AUTO: 31.9 PG (ref 26–33)
MCHC RBC AUTO-ENTMCNC: 32.5 GM/DL (ref 32.2–35.5)
MCV RBC AUTO: 98.4 FL (ref 80–94)
MONOCYTES ABSOLUTE: 0.5 THOU/MM3 (ref 0.4–1.3)
MONOCYTES NFR BLD AUTO: 8.3 %
NEUTROPHILS ABSOLUTE: 4.7 THOU/MM3 (ref 1.8–7.7)
NEUTROPHILS NFR BLD AUTO: 77.5 %
NRBC BLD AUTO-RTO: 0 /100 WBC
OSMOLALITY SERPL CALC.SUM OF ELEC: 281.2 MOSMOL/KG (ref 275–300)
PLATELET # BLD AUTO: 302 THOU/MM3 (ref 130–400)
PMV BLD AUTO: 9.7 FL (ref 9.4–12.4)
POTASSIUM SERPL-SCNC: 3.9 MEQ/L (ref 3.5–5.2)
RBC # BLD AUTO: 3.13 MILL/MM3 (ref 4.7–6.1)
SODIUM SERPL-SCNC: 141 MEQ/L (ref 135–145)
WBC # BLD AUTO: 6 THOU/MM3 (ref 4.8–10.8)

## 2024-08-18 PROCEDURE — 6370000000 HC RX 637 (ALT 250 FOR IP): Performed by: EMERGENCY MEDICINE

## 2024-08-18 PROCEDURE — 87040 BLOOD CULTURE FOR BACTERIA: CPT

## 2024-08-18 PROCEDURE — 80048 BASIC METABOLIC PNL TOTAL CA: CPT

## 2024-08-18 PROCEDURE — 51705 CHANGE OF BLADDER TUBE: CPT

## 2024-08-18 PROCEDURE — 85025 COMPLETE CBC W/AUTO DIFF WBC: CPT

## 2024-08-18 PROCEDURE — 36415 COLL VENOUS BLD VENIPUNCTURE: CPT

## 2024-08-18 PROCEDURE — 99285 EMERGENCY DEPT VISIT HI MDM: CPT

## 2024-08-18 PROCEDURE — 6360000004 HC RX CONTRAST MEDICATION: Performed by: EMERGENCY MEDICINE

## 2024-08-18 PROCEDURE — 74177 CT ABD & PELVIS W/CONTRAST: CPT

## 2024-08-18 PROCEDURE — 83690 ASSAY OF LIPASE: CPT

## 2024-08-18 RX ADMIN — IOPAMIDOL 80 ML: 755 INJECTION, SOLUTION INTRAVENOUS at 13:56

## 2024-08-18 RX ADMIN — MUPIROCIN: 20 OINTMENT TOPICAL at 16:57

## 2024-08-18 ASSESSMENT — PAIN DESCRIPTION - ORIENTATION: ORIENTATION: MID

## 2024-08-18 ASSESSMENT — PAIN DESCRIPTION - LOCATION: LOCATION: ABDOMEN

## 2024-08-18 ASSESSMENT — PAIN DESCRIPTION - FREQUENCY: FREQUENCY: CONTINUOUS

## 2024-08-18 ASSESSMENT — PAIN DESCRIPTION - PAIN TYPE: TYPE: ACUTE PAIN

## 2024-08-18 ASSESSMENT — PAIN SCALES - GENERAL: PAINLEVEL_OUTOF10: 3

## 2024-08-18 ASSESSMENT — PAIN - FUNCTIONAL ASSESSMENT: PAIN_FUNCTIONAL_ASSESSMENT: 0-10

## 2024-08-18 NOTE — ED NOTES
Pt to ED [facility] for c/c suprapubic catheter problem. Pt states having catheter inserted 2 weeks ago for enlarged bladder. Pt states yesterday nursing staff noted area around catheter to \"look infected\". Drainage and redness noted around site.

## 2024-08-18 NOTE — ED NOTES
Pt restful on cart w/family at bedside. Updated on POC/DC planning. Will arrange transport back to ECF.

## 2024-08-18 NOTE — ED PROVIDER NOTES
[facility] EMERGENCY DEPT      CHIEF COMPLAINT     No chief complaint on file.      Nurses Notes reviewed and I agree except as noted in the HPI.      HISTORY OF PRESENT ILLNESS    Ceferino Sanches Jr is a 78 y.o. male who presents with complaint of infection around his suprapubic catheter, patient states that the nursing home staff noted some skin breakdown mild cellulitis at the catheter insertion site.  Patient also complaining of generalized abdominal pain, denies fever chills or diarrhea.  Onset: Acute on subacute  Duration: Gradually getting worse for a week  Timing: Persistent  Location of Pain: Discomfort around the suprapubic insertion site, also generalized abdominal pain  Intesity/severity: Mild pain,  Modifying Factors:   Relieved by;  Previous Episodes;  Tx Before arrival: None    PAST MEDICAL HISTORY    has a past medical history of BRENNA (acute kidney injury) ([facility]), Anxiety disorder, Cancer ([facility]), Cardiac murmur, Constipation, COPD (chronic obstructive pulmonary disease) ([facility]), Dementia associated with other underlying disease without behavioral disturbance ([facility]), Diarrhea, Epilepsy ([facility]), Head injury, HLD (hyperlipidemia), Hyperkalemia, Hypertension, Insomnia, Malnutrition ([facility]), Mood disturbance, Seizure disorder ([facility]), Tinea pedis, and Urinary retention.    SURGICAL HISTORY      has a past surgical history that includes Hemorrhoid surgery; Cataract removal; and Suprapubic catheter.    CURRENT MEDICATIONS       Previous Medications    ACETAMINOPHEN (TYLENOL) 500 MG TABLET    Take 2 tablets by mouth every 6 hours as needed for Pain    AMLODIPINE (NORVASC) 10 MG TABLET    Take 1 tablet by mouth daily    CYCLOBENZAPRINE (FLEXERIL) 5 MG TABLET    Take 1 tablet by mouth 2 times daily    CYCLOBENZAPRINE (FLEXERIL) 5 MG TABLET    Take 1 tablet by mouth daily as needed for Muscle spasms Additional daily dose as needed    ESLICARBAZEPINE ACETATE (APTIOM) 200 MG TABLET    Take 200 mg by mouth daily Give with 800

## 2024-08-19 NOTE — PROGRESS NOTES
Physician Progress Note      PATIENT:               FILI SAINI JR  CSN #:                  [id number]  :                       1945  ADMIT DATE:       2024 6:35 PM  DISCH DATE:        2024 1:00 AM  RESPONDING  PROVIDER #:        Artie Iglesias          QUERY TEXT:    Pt admitted with Seizure , bleeding from surgical site with Sepsis developing    .  Pt noted to have Supra pubic catheter placed  prior to admission. If   possible, please make selection below, document in the progress notes and   discharge summary if you are evaluating and / or treating any of the   following:    The medical record reflects the following:  Risk Factors: Supra Pubic catheter placed Prior to admission 24 for   urinary retention  Clinical Indicators: Catheter placed 24, UA on admission -moderate,   cloudy, no bacteria ,large blood , wbc 25-50 Urine culture showing   -Pseudomonas aeruginosa. On admission patient heart rate >90, lactic acid 1.4,   4.3 , WBC 9.5, 7.6, 25.2. Sepsis developing 24  Treatment: admission , imaging labs, Urology consult , Iv antibiotics- Zosyn ,   vancomycin    Thank you,  Rissa GRANADOS, RN, CRCR  Clinical   P: [phone]  F: [phone]  Options provided:  -- Sepsis, UTI related to Suprapubic catheter insertion  -- Sepsis, UTI unrelated to Suprapubic catheter insertion  -- Other - I will add my own diagnosis  -- Disagree - Not applicable / Not valid  -- Disagree - Clinically unable to determine / Unknown  -- Refer to Clinical Documentation Reviewer    PROVIDER RESPONSE TEXT:    Provider is clinically unable to determine a response to this query.  This gentleman has had cystoscopy, urinary retension, this suprapubic catheter   insertion. Hard to say what caused his infection.    Query created by: Rissa Gudino on 2024 3:21 PM      Electronically signed by:  Artie Iglesias 2024 11:46 AM

## 2024-08-19 NOTE — DISCHARGE SUMMARY
Internal Medicine Resident Team  Discharge Summary     Patient Identification:  Ceferino Sanches Jr  : 1945  MRN: [id number]   Account: [id number]     Admit date: 2024  Discharge date: 2024   Attending provider: No att. providers found        Primary care provider: Jihan Lyon MD     Discharge Diagnoses:   Complicated UTI: Sepsis as below.  Cx: pansensitive pseudomonas. Received 3 days of Zosyn 10 days total.  -Continue Cipro 500 bid x 7 days (day )     Seizure-like activity:  Previous hx. Symptoms Blank stare, Started w/ TBI 10 yo. Last seizure ~ 1 year ago. Rapid  for these symptoms. See's epilepsy specialist in [place]. Follows with neurology as OP. Keppra XR 1250 mg BID at home, EEG sharps in temporal lobe, CT head negative  -seizure precautions  -Keppra 1250mg BID and the trileptal 450mg BID per neurology  -Continue to follow with epilepsy specialist     Carpal tunnel: Patient has symptoms of bilateral carpal tunnel.  Right hand has thenar muscle atrophy.  Positive Tinel's test on both hands.  Patient endorses waking up at night wringing hands.  Endorses weakness, numbness, and tingling in fingertips as well as difficulty lifting things.  -Recommend neutral wrist braces -could not get in hospital.  -Will give referral to [facility].        Resolved Problems  Sepsis-resolved: 2/2 UTI.  SIRS 3/4, temp 104.4 max,  max, WBC 25.2 max, lactic acid 4.3, SOFA 4.  Today afebrile-had temp of 38 degrees has been taking Tylenol at least bid, HR remains tachycardic at times max 105, had /76.  WBC WNL.  Continue to follow blood cultures.  BRENNA on CKD stage II-resolved: Creat 1.6, baseline ~1.2.  1.1 today.  Continue to monitor  Surgical site bleeding-resolved: S/p suprapubic catheter placement 24. CT A/P no problems in GI or  tracts, hematuria on UA. No blood noted in urine or at site last few days.   -Monitor site for further bleeding, apply dry dressing as

## 2024-08-23 LAB
BACTERIA BLD AEROBE CULT: NORMAL
BACTERIA BLD AEROBE CULT: NORMAL

## 2024-09-08 ENCOUNTER — HOSPITAL ENCOUNTER (EMERGENCY)
Age: 79
Discharge: HOME | End: 2024-09-08
Attending: STUDENT IN AN ORGANIZED HEALTH CARE EDUCATION/TRAINING PROGRAM
Payer: MEDICARE

## 2024-09-08 VITALS
HEART RATE: 100 BPM | WEIGHT: 182 LBS | HEIGHT: 69 IN | OXYGEN SATURATION: 93 % | RESPIRATION RATE: 18 BRPM | BODY MASS INDEX: 26.96 KG/M2 | SYSTOLIC BLOOD PRESSURE: 132 MMHG | DIASTOLIC BLOOD PRESSURE: 88 MMHG | TEMPERATURE: 97.7 F

## 2024-09-08 DIAGNOSIS — T83.010A SUPRAPUBIC CATHETER DYSFUNCTION, INITIAL ENCOUNTER (HCC): Primary | ICD-10-CM

## 2024-09-08 PROCEDURE — 99283 EMERGENCY DEPT VISIT LOW MDM: CPT

## 2024-09-08 PROCEDURE — 99284 EMERGENCY DEPT VISIT MOD MDM: CPT

## 2024-09-08 PROCEDURE — 51702 INSERT TEMP BLADDER CATH: CPT

## 2024-09-08 ASSESSMENT — PAIN - FUNCTIONAL ASSESSMENT: PAIN_FUNCTIONAL_ASSESSMENT: 0-10

## 2024-09-08 ASSESSMENT — PAIN SCALES - GENERAL: PAINLEVEL_OUTOF10: 4

## 2024-09-08 ASSESSMENT — PAIN DESCRIPTION - DESCRIPTORS: DESCRIPTORS: PRESSURE

## 2024-09-08 ASSESSMENT — PAIN DESCRIPTION - LOCATION: LOCATION: ABDOMEN

## 2025-01-20 ENCOUNTER — HOSPITAL ENCOUNTER (OUTPATIENT)
Age: 80
Setting detail: OBSERVATION
Discharge: OTHER INSTITUTION | End: 2025-01-21
Attending: STUDENT IN AN ORGANIZED HEALTH CARE EDUCATION/TRAINING PROGRAM | Admitting: INTERNAL MEDICINE
Payer: MEDICARE

## 2025-01-20 ENCOUNTER — APPOINTMENT (OUTPATIENT)
Dept: CT IMAGING | Age: 80
End: 2025-01-20
Payer: MEDICARE

## 2025-01-20 DIAGNOSIS — R33.9 URINARY RETENTION: ICD-10-CM

## 2025-01-20 DIAGNOSIS — K92.2 GASTROINTESTINAL HEMORRHAGE, UNSPECIFIED GASTROINTESTINAL HEMORRHAGE TYPE: Primary | ICD-10-CM

## 2025-01-20 DIAGNOSIS — N17.9 AKI (ACUTE KIDNEY INJURY) (HCC): ICD-10-CM

## 2025-01-20 LAB
ABO: NORMAL
ALBUMIN SERPL BCG-MCNC: 4.2 G/DL (ref 3.5–5.1)
ALP SERPL-CCNC: 139 U/L (ref 38–126)
ALT SERPL W/O P-5'-P-CCNC: 14 U/L (ref 11–66)
AMORPH SED URNS QL MICRO: ABNORMAL
ANION GAP SERPL CALC-SCNC: 18 MEQ/L (ref 8–16)
ANTIBODY SCREEN: NORMAL
APTT PPP: 31.9 SECONDS (ref 22–38)
AST SERPL-CCNC: 22 U/L (ref 5–40)
BACTERIA URNS QL MICRO: ABNORMAL /HPF
BASOPHILS ABSOLUTE: 0 THOU/MM3 (ref 0–0.1)
BASOPHILS NFR BLD AUTO: 0.4 %
BILIRUB CONJ SERPL-MCNC: < 0.1 MG/DL (ref 0.1–13.8)
BILIRUB SERPL-MCNC: 0.4 MG/DL (ref 0.3–1.2)
BILIRUB UR QL STRIP.AUTO: NEGATIVE
BUN SERPL-MCNC: 33 MG/DL (ref 7–22)
CALCIUM SERPL-MCNC: 8.9 MG/DL (ref 8.5–10.5)
CASTS #/AREA URNS LPF: ABNORMAL /LPF
CASTS 2: ABNORMAL /LPF
CHARACTER UR: ABNORMAL
CHLORIDE SERPL-SCNC: 103 MEQ/L (ref 98–111)
CO2 SERPL-SCNC: 18 MEQ/L (ref 23–33)
COLOR, UA: ABNORMAL
CREAT SERPL-MCNC: 2.2 MG/DL (ref 0.4–1.2)
CRYSTALS URNS MICRO: ABNORMAL
CRYSTALS URNS MICRO: ABNORMAL
DEPRECATED RDW RBC AUTO: 48.7 FL (ref 35–45)
EKG ATRIAL RATE: 110 BPM
EKG P AXIS: 57 DEGREES
EKG P-R INTERVAL: 156 MS
EKG Q-T INTERVAL: 342 MS
EKG QRS DURATION: 84 MS
EKG QTC CALCULATION (BAZETT): 462 MS
EKG R AXIS: -6 DEGREES
EKG T AXIS: 54 DEGREES
EKG VENTRICULAR RATE: 110 BPM
EOSINOPHIL NFR BLD AUTO: 0.3 %
EOSINOPHILS ABSOLUTE: 0 THOU/MM3 (ref 0–0.4)
EPITHELIAL CELLS, UA: ABNORMAL /HPF
ERYTHROCYTE [DISTWIDTH] IN BLOOD BY AUTOMATED COUNT: 13.9 % (ref 11.5–14.5)
GFR SERPL CREATININE-BSD FRML MDRD: 30 ML/MIN/1.73M2
GLUCOSE SERPL-MCNC: 111 MG/DL (ref 70–108)
GLUCOSE UR QL STRIP.AUTO: NEGATIVE MG/DL
HCT VFR BLD AUTO: 35.6 % (ref 42–52)
HEMOCCULT STL QL: NEGATIVE
HGB BLD-MCNC: 12 GM/DL (ref 14–18)
HGB UR QL STRIP.AUTO: ABNORMAL
IMM GRANULOCYTES # BLD AUTO: 0.04 THOU/MM3 (ref 0–0.07)
IMM GRANULOCYTES NFR BLD AUTO: 0.6 %
INR PPP: 1.1 (ref 0.85–1.13)
KETONES UR QL STRIP.AUTO: ABNORMAL
LACTIC ACID, SEPSIS: 1.1 MMOL/L (ref 0.5–1.9)
LACTIC ACID, SEPSIS: 1.5 MMOL/L (ref 0.5–1.9)
LIPASE SERPL-CCNC: 18.3 U/L (ref 5.6–51.3)
LYMPHOCYTES ABSOLUTE: 0.6 THOU/MM3 (ref 1–4.8)
LYMPHOCYTES NFR BLD AUTO: 8.4 %
MAGNESIUM SERPL-MCNC: 2 MG/DL (ref 1.6–2.4)
MCH RBC QN AUTO: 32.4 PG (ref 26–33)
MCHC RBC AUTO-ENTMCNC: 33.7 GM/DL (ref 32.2–35.5)
MCV RBC AUTO: 96.2 FL (ref 80–94)
MISCELLANEOUS 2: ABNORMAL
MISCELLANEOUS LAB TEST RESULT: ABNORMAL
MONOCYTES ABSOLUTE: 0.9 THOU/MM3 (ref 0.4–1.3)
MONOCYTES NFR BLD AUTO: 12.9 %
MUCOUS THREADS URNS QL MICRO: ABNORMAL
NEUTROPHILS ABSOLUTE: 5.5 THOU/MM3 (ref 1.8–7.7)
NEUTROPHILS NFR BLD AUTO: 77.4 %
NITRITE UR QL STRIP: NEGATIVE
NRBC BLD AUTO-RTO: 0 /100 WBC
OSMOLALITY SERPL CALC.SUM OF ELEC: 285.5 MOSMOL/KG (ref 275–300)
PH UR STRIP.AUTO: 5 [PH] (ref 5–9)
PLATELET # BLD AUTO: 183 THOU/MM3 (ref 130–400)
PMV BLD AUTO: 9.9 FL (ref 9.4–12.4)
POTASSIUM SERPL-SCNC: 4.6 MEQ/L (ref 3.5–5.2)
PROT SERPL-MCNC: 7.1 G/DL (ref 6.1–8)
PROT UR STRIP.AUTO-MCNC: 100 MG/DL
RBC # BLD AUTO: 3.7 MILL/MM3 (ref 4.7–6.1)
RBC URINE: ABNORMAL /HPF
RENAL EPI CELLS #/AREA URNS HPF: ABNORMAL /[HPF]
RH FACTOR: NORMAL
SODIUM SERPL-SCNC: 139 MEQ/L (ref 135–145)
SP GR UR REFRACT.AUTO: 1.02 (ref 1–1.03)
TROPONIN, HIGH SENSITIVITY: 35 NG/L (ref 0–12)
UROBILINOGEN, URINE: 0.2 EU/DL (ref 0–1)
WBC # BLD AUTO: 7.1 THOU/MM3 (ref 4.8–10.8)
WBC #/AREA URNS HPF: ABNORMAL /HPF
WBC #/AREA URNS HPF: ABNORMAL /[HPF]
YEAST LIKE FUNGI URNS QL MICRO: ABNORMAL

## 2025-01-20 PROCEDURE — 96376 TX/PRO/DX INJ SAME DRUG ADON: CPT

## 2025-01-20 PROCEDURE — 85025 COMPLETE CBC W/AUTO DIFF WBC: CPT

## 2025-01-20 PROCEDURE — 6370000000 HC RX 637 (ALT 250 FOR IP): Performed by: PHYSICIAN ASSISTANT

## 2025-01-20 PROCEDURE — 2500000003 HC RX 250 WO HCPCS: Performed by: PHYSICIAN ASSISTANT

## 2025-01-20 PROCEDURE — 81001 URINALYSIS AUTO W/SCOPE: CPT

## 2025-01-20 PROCEDURE — 82248 BILIRUBIN DIRECT: CPT

## 2025-01-20 PROCEDURE — 96361 HYDRATE IV INFUSION ADD-ON: CPT

## 2025-01-20 PROCEDURE — G0378 HOSPITAL OBSERVATION PER HR: HCPCS

## 2025-01-20 PROCEDURE — 93005 ELECTROCARDIOGRAM TRACING: CPT | Performed by: STUDENT IN AN ORGANIZED HEALTH CARE EDUCATION/TRAINING PROGRAM

## 2025-01-20 PROCEDURE — 83605 ASSAY OF LACTIC ACID: CPT

## 2025-01-20 PROCEDURE — 2580000003 HC RX 258: Performed by: STUDENT IN AN ORGANIZED HEALTH CARE EDUCATION/TRAINING PROGRAM

## 2025-01-20 PROCEDURE — 96374 THER/PROPH/DIAG INJ IV PUSH: CPT

## 2025-01-20 PROCEDURE — 74176 CT ABD & PELVIS W/O CONTRAST: CPT

## 2025-01-20 PROCEDURE — 6370000000 HC RX 637 (ALT 250 FOR IP): Performed by: STUDENT IN AN ORGANIZED HEALTH CARE EDUCATION/TRAINING PROGRAM

## 2025-01-20 PROCEDURE — 51702 INSERT TEMP BLADDER CATH: CPT

## 2025-01-20 PROCEDURE — 87086 URINE CULTURE/COLONY COUNT: CPT

## 2025-01-20 PROCEDURE — 86850 RBC ANTIBODY SCREEN: CPT

## 2025-01-20 PROCEDURE — 6360000002 HC RX W HCPCS: Performed by: STUDENT IN AN ORGANIZED HEALTH CARE EDUCATION/TRAINING PROGRAM

## 2025-01-20 PROCEDURE — 84484 ASSAY OF TROPONIN QUANT: CPT

## 2025-01-20 PROCEDURE — 99285 EMERGENCY DEPT VISIT HI MDM: CPT

## 2025-01-20 PROCEDURE — 36415 COLL VENOUS BLD VENIPUNCTURE: CPT

## 2025-01-20 PROCEDURE — 82272 OCCULT BLD FECES 1-3 TESTS: CPT

## 2025-01-20 PROCEDURE — 85730 THROMBOPLASTIN TIME PARTIAL: CPT

## 2025-01-20 PROCEDURE — 96375 TX/PRO/DX INJ NEW DRUG ADDON: CPT

## 2025-01-20 PROCEDURE — 83735 ASSAY OF MAGNESIUM: CPT

## 2025-01-20 PROCEDURE — 99223 1ST HOSP IP/OBS HIGH 75: CPT | Performed by: PHYSICIAN ASSISTANT

## 2025-01-20 PROCEDURE — 86901 BLOOD TYPING SEROLOGIC RH(D): CPT

## 2025-01-20 PROCEDURE — 2580000003 HC RX 258: Performed by: PHYSICIAN ASSISTANT

## 2025-01-20 PROCEDURE — 83690 ASSAY OF LIPASE: CPT

## 2025-01-20 PROCEDURE — 80053 COMPREHEN METABOLIC PANEL: CPT

## 2025-01-20 PROCEDURE — 93010 ELECTROCARDIOGRAM REPORT: CPT | Performed by: INTERNAL MEDICINE

## 2025-01-20 PROCEDURE — 51798 US URINE CAPACITY MEASURE: CPT

## 2025-01-20 PROCEDURE — 6360000002 HC RX W HCPCS: Performed by: PHYSICIAN ASSISTANT

## 2025-01-20 PROCEDURE — 86900 BLOOD TYPING SEROLOGIC ABO: CPT

## 2025-01-20 PROCEDURE — 85610 PROTHROMBIN TIME: CPT

## 2025-01-20 RX ORDER — 0.9 % SODIUM CHLORIDE 0.9 %
500 INTRAVENOUS SOLUTION INTRAVENOUS ONCE
Status: COMPLETED | OUTPATIENT
Start: 2025-01-20 | End: 2025-01-20

## 2025-01-20 RX ORDER — SODIUM CHLORIDE 9 MG/ML
INJECTION, SOLUTION INTRAVENOUS PRN
Status: DISCONTINUED | OUTPATIENT
Start: 2025-01-20 | End: 2025-01-21 | Stop reason: HOSPADM

## 2025-01-20 RX ORDER — PANTOPRAZOLE SODIUM 40 MG/10ML
80 INJECTION, POWDER, LYOPHILIZED, FOR SOLUTION INTRAVENOUS ONCE
Status: COMPLETED | OUTPATIENT
Start: 2025-01-20 | End: 2025-01-20

## 2025-01-20 RX ORDER — MAGNESIUM SULFATE IN WATER 40 MG/ML
2000 INJECTION, SOLUTION INTRAVENOUS PRN
Status: DISCONTINUED | OUTPATIENT
Start: 2025-01-20 | End: 2025-01-21 | Stop reason: HOSPADM

## 2025-01-20 RX ORDER — SODIUM CHLORIDE 9 MG/ML
INJECTION, SOLUTION INTRAVENOUS CONTINUOUS
Status: ACTIVE | OUTPATIENT
Start: 2025-01-20 | End: 2025-01-21

## 2025-01-20 RX ORDER — CYCLOBENZAPRINE HCL 10 MG
5 TABLET ORAL 2 TIMES DAILY
Status: DISCONTINUED | OUTPATIENT
Start: 2025-01-20 | End: 2025-01-21 | Stop reason: HOSPADM

## 2025-01-20 RX ORDER — AMLODIPINE BESYLATE 10 MG/1
10 TABLET ORAL DAILY
Status: DISCONTINUED | OUTPATIENT
Start: 2025-01-20 | End: 2025-01-21 | Stop reason: HOSPADM

## 2025-01-20 RX ORDER — MIRTAZAPINE 15 MG/1
15 TABLET, FILM COATED ORAL NIGHTLY
Status: DISCONTINUED | OUTPATIENT
Start: 2025-01-20 | End: 2025-01-21 | Stop reason: HOSPADM

## 2025-01-20 RX ORDER — LISINOPRIL 10 MG/1
10 TABLET ORAL DAILY
Status: DISCONTINUED | OUTPATIENT
Start: 2025-01-20 | End: 2025-01-21 | Stop reason: HOSPADM

## 2025-01-20 RX ORDER — PANTOPRAZOLE SODIUM 40 MG/10ML
40 INJECTION, POWDER, LYOPHILIZED, FOR SOLUTION INTRAVENOUS 2 TIMES DAILY
Status: DISCONTINUED | OUTPATIENT
Start: 2025-01-20 | End: 2025-01-21 | Stop reason: ALTCHOICE

## 2025-01-20 RX ORDER — LEVETIRACETAM 500 MG/5ML
750 INJECTION, SOLUTION, CONCENTRATE INTRAVENOUS ONCE
Status: COMPLETED | OUTPATIENT
Start: 2025-01-20 | End: 2025-01-20

## 2025-01-20 RX ORDER — PRAVASTATIN SODIUM 40 MG
40 TABLET ORAL DAILY
Status: DISCONTINUED | OUTPATIENT
Start: 2025-01-20 | End: 2025-01-21 | Stop reason: HOSPADM

## 2025-01-20 RX ORDER — ONDANSETRON 4 MG/1
4 TABLET, ORALLY DISINTEGRATING ORAL EVERY 8 HOURS PRN
Status: DISCONTINUED | OUTPATIENT
Start: 2025-01-20 | End: 2025-01-21 | Stop reason: HOSPADM

## 2025-01-20 RX ORDER — POTASSIUM CHLORIDE 1500 MG/1
40 TABLET, EXTENDED RELEASE ORAL PRN
Status: DISCONTINUED | OUTPATIENT
Start: 2025-01-20 | End: 2025-01-21 | Stop reason: HOSPADM

## 2025-01-20 RX ORDER — GABAPENTIN 300 MG/1
300 CAPSULE ORAL DAILY
Status: DISCONTINUED | OUTPATIENT
Start: 2025-01-20 | End: 2025-01-21 | Stop reason: HOSPADM

## 2025-01-20 RX ORDER — ACETAMINOPHEN 500 MG
1000 TABLET ORAL ONCE
Status: COMPLETED | OUTPATIENT
Start: 2025-01-20 | End: 2025-01-20

## 2025-01-20 RX ORDER — POLYETHYLENE GLYCOL 3350 17 G/17G
17 POWDER, FOR SOLUTION ORAL DAILY PRN
Status: DISCONTINUED | OUTPATIENT
Start: 2025-01-20 | End: 2025-01-21 | Stop reason: HOSPADM

## 2025-01-20 RX ORDER — IOPAMIDOL 755 MG/ML
80 INJECTION, SOLUTION INTRAVASCULAR
Status: DISCONTINUED | OUTPATIENT
Start: 2025-01-20 | End: 2025-01-21 | Stop reason: HOSPADM

## 2025-01-20 RX ORDER — CYCLOBENZAPRINE HCL 10 MG
5 TABLET ORAL DAILY PRN
Status: DISCONTINUED | OUTPATIENT
Start: 2025-01-20 | End: 2025-01-21 | Stop reason: HOSPADM

## 2025-01-20 RX ORDER — ACETAMINOPHEN 325 MG/1
650 TABLET ORAL EVERY 6 HOURS PRN
Status: DISCONTINUED | OUTPATIENT
Start: 2025-01-20 | End: 2025-01-21 | Stop reason: HOSPADM

## 2025-01-20 RX ORDER — SODIUM CHLORIDE 0.9 % (FLUSH) 0.9 %
5-40 SYRINGE (ML) INJECTION PRN
Status: DISCONTINUED | OUTPATIENT
Start: 2025-01-20 | End: 2025-01-21 | Stop reason: HOSPADM

## 2025-01-20 RX ORDER — TAMSULOSIN HYDROCHLORIDE 0.4 MG/1
0.4 CAPSULE ORAL DAILY
Status: DISCONTINUED | OUTPATIENT
Start: 2025-01-20 | End: 2025-01-21 | Stop reason: HOSPADM

## 2025-01-20 RX ORDER — ONDANSETRON 2 MG/ML
4 INJECTION INTRAMUSCULAR; INTRAVENOUS EVERY 6 HOURS PRN
Status: DISCONTINUED | OUTPATIENT
Start: 2025-01-20 | End: 2025-01-21 | Stop reason: HOSPADM

## 2025-01-20 RX ORDER — SODIUM CHLORIDE 0.9 % (FLUSH) 0.9 %
5-40 SYRINGE (ML) INJECTION EVERY 12 HOURS SCHEDULED
Status: DISCONTINUED | OUTPATIENT
Start: 2025-01-20 | End: 2025-01-21 | Stop reason: HOSPADM

## 2025-01-20 RX ORDER — POTASSIUM CHLORIDE 7.45 MG/ML
10 INJECTION INTRAVENOUS PRN
Status: DISCONTINUED | OUTPATIENT
Start: 2025-01-20 | End: 2025-01-21 | Stop reason: HOSPADM

## 2025-01-20 RX ORDER — ACETAMINOPHEN 650 MG/1
650 SUPPOSITORY RECTAL EVERY 6 HOURS PRN
Status: DISCONTINUED | OUTPATIENT
Start: 2025-01-20 | End: 2025-01-21 | Stop reason: HOSPADM

## 2025-01-20 RX ORDER — SENNOSIDES A AND B 8.6 MG/1
1 TABLET, FILM COATED ORAL NIGHTLY
Status: DISCONTINUED | OUTPATIENT
Start: 2025-01-20 | End: 2025-01-21 | Stop reason: HOSPADM

## 2025-01-20 RX ORDER — LIDOCAINE HYDROCHLORIDE 20 MG/ML
JELLY TOPICAL ONCE
Status: COMPLETED | OUTPATIENT
Start: 2025-01-20 | End: 2025-01-20

## 2025-01-20 RX ADMIN — AMLODIPINE BESYLATE 10 MG: 10 TABLET ORAL at 16:27

## 2025-01-20 RX ADMIN — CYCLOBENZAPRINE 5 MG: 10 TABLET, FILM COATED ORAL at 21:03

## 2025-01-20 RX ADMIN — PANTOPRAZOLE SODIUM 40 MG: 40 INJECTION, POWDER, FOR SOLUTION INTRAVENOUS at 21:03

## 2025-01-20 RX ADMIN — SODIUM CHLORIDE 500 ML: 9 INJECTION, SOLUTION INTRAVENOUS at 12:56

## 2025-01-20 RX ADMIN — ACETAMINOPHEN 650 MG: 325 TABLET ORAL at 21:03

## 2025-01-20 RX ADMIN — LEVETIRACETAM 750 MG: 100 INJECTION INTRAVENOUS at 11:18

## 2025-01-20 RX ADMIN — GABAPENTIN 300 MG: 300 CAPSULE ORAL at 16:27

## 2025-01-20 RX ADMIN — SODIUM CHLORIDE: 9 INJECTION, SOLUTION INTRAVENOUS at 15:27

## 2025-01-20 RX ADMIN — LEVETIRACETAM 750 MG: 500 TABLET, FILM COATED ORAL at 21:25

## 2025-01-20 RX ADMIN — PANTOPRAZOLE SODIUM 80 MG: 40 INJECTION, POWDER, FOR SOLUTION INTRAVENOUS at 11:17

## 2025-01-20 RX ADMIN — LIDOCAINE HYDROCHLORIDE: 20 JELLY TOPICAL at 11:17

## 2025-01-20 RX ADMIN — MIRTAZAPINE 15 MG: 15 TABLET, FILM COATED ORAL at 21:03

## 2025-01-20 RX ADMIN — SODIUM CHLORIDE 500 ML: 9 INJECTION, SOLUTION INTRAVENOUS at 11:20

## 2025-01-20 RX ADMIN — ACETAMINOPHEN 500 MG TABLET 1000 MG: at 11:20

## 2025-01-20 RX ADMIN — SODIUM CHLORIDE, PRESERVATIVE FREE 10 ML: 5 INJECTION INTRAVENOUS at 21:03

## 2025-01-20 RX ADMIN — TAMSULOSIN HYDROCHLORIDE 0.4 MG: 0.4 CAPSULE ORAL at 16:27

## 2025-01-20 RX ADMIN — PRAVASTATIN SODIUM 40 MG: 40 TABLET ORAL at 16:27

## 2025-01-20 ASSESSMENT — PAIN SCALES - GENERAL
PAINLEVEL_OUTOF10: 4
PAINLEVEL_OUTOF10: 0
PAINLEVEL_OUTOF10: 3
PAINLEVEL_OUTOF10: 4
PAINLEVEL_OUTOF10: 0
PAINLEVEL_OUTOF10: 4
PAINLEVEL_OUTOF10: 5

## 2025-01-20 ASSESSMENT — PAIN DESCRIPTION - FREQUENCY
FREQUENCY: CONTINUOUS
FREQUENCY: INTERMITTENT

## 2025-01-20 ASSESSMENT — PAIN DESCRIPTION - ONSET
ONSET: ON-GOING
ONSET: ON-GOING

## 2025-01-20 ASSESSMENT — PAIN - FUNCTIONAL ASSESSMENT
PAIN_FUNCTIONAL_ASSESSMENT: ACTIVITIES ARE NOT PREVENTED
PAIN_FUNCTIONAL_ASSESSMENT: 0-10
PAIN_FUNCTIONAL_ASSESSMENT: NONE - DENIES PAIN
PAIN_FUNCTIONAL_ASSESSMENT: NONE - DENIES PAIN
PAIN_FUNCTIONAL_ASSESSMENT: 0-10

## 2025-01-20 ASSESSMENT — PAIN DESCRIPTION - LOCATION
LOCATION: HEAD
LOCATION: ABDOMEN

## 2025-01-20 ASSESSMENT — PAIN DESCRIPTION - PAIN TYPE
TYPE: ACUTE PAIN
TYPE: ACUTE PAIN

## 2025-01-20 ASSESSMENT — PAIN DESCRIPTION - ORIENTATION
ORIENTATION: LEFT;RIGHT
ORIENTATION: LOWER

## 2025-01-20 ASSESSMENT — PAIN DESCRIPTION - DESCRIPTORS
DESCRIPTORS: ACHING
DESCRIPTORS: PRESSURE

## 2025-01-20 NOTE — ED NOTES
Pt resting on cot at this time. No needs voiced at this time. Urine specimen obtained and sent. Call light in reach.

## 2025-01-20 NOTE — ED NOTES
Pt resting on cot at this time. Pt denies pain at this time. Call light in reach. No needs voiced.

## 2025-01-20 NOTE — ED TRIAGE NOTES
Pt to ED with diarrhea for four days. Pt states that it started as normal diarrhea and now he notices that is now mostly a bright red color. Pt states that his lower abdomen is sore. Pt does not have any nausea at this time. Pt states that he has felt weak since Thursday as well. EKG done.

## 2025-01-20 NOTE — ED NOTES
Pt medicated per MAR. John inserted. Pt tolerated well. Pt resting on cot at this time. No needs voiced. Family at bedside. Call light in reach.

## 2025-01-20 NOTE — ED NOTES
Spoke with Flora on 8B to approve the transport of patient to 8B30. Patient in stable condition at this time.

## 2025-01-20 NOTE — ED PROVIDER NOTES
[facility]  EMERGENCY DEPARTMENT ENCOUNTER      Patient Name: Ceferino Sanches Jr  MRN: [id number]  YOB: 1945  Date of Evaluation: 1/20/2025  Attending Physician: Rob Girard MD    CHIEF COMPLAINT       Chief Complaint   Patient presents with    Diarrhea       HISTORY OF PRESENT ILLNESS    HPI    History obtained from chart review and the patient.    Ceferino Mathis\" is a 79 y.o. old male who presents to the emergency department by Ambulance for evaluation of dark-colored diarrhea.  Patient reports this started about 5 days ago and is gotten worse.  He is also passing blood he reports that the blood is not mixed in with the dark-colored stool.  No fever no chills no nausea or vomiting however he is having lower abdominal pain that is been getting worse over the past few days.  He thinks his last colonoscopy was about 3 months ago here at the hospital.  He also has a history of seizures but these have been well-controlled on levetiracetam however however he has not yet taken this today.  Denies any prior episodes of this.  He also has chronic urinary retention and the catheter was inadvertently removed at the nursing home today he reports there is some bleeding after this and penile pain the bleeding stopped when he put a tissue on the end of his penis.  Has not been able to urinate since.    Chart reviewed, relevant history summarized in HPI above.      REVIEW OF SYSTEMS   Review of Systems  Negative unless documented in HPI    PAST MEDICAL AND SURGICAL HISTORY   Ceferino Hall"  has a past medical history of BRENNA (acute kidney injury) ([facility]), Anxiety disorder, Cancer ([facility]), Cardiac murmur, Constipation, COPD (chronic obstructive pulmonary disease) ([facility]), Dementia associated with other underlying disease without behavioral disturbance ([facility]), Diarrhea, Epilepsy ([facility]), Head injury, HLD (hyperlipidemia), Hyperkalemia, Hypertension, Insomnia, Malnutrition ([facility]), Mood

## 2025-01-21 VITALS
DIASTOLIC BLOOD PRESSURE: 89 MMHG | TEMPERATURE: 98.1 F | OXYGEN SATURATION: 94 % | HEART RATE: 103 BPM | BODY MASS INDEX: 28.64 KG/M2 | HEIGHT: 69 IN | WEIGHT: 193.34 LBS | SYSTOLIC BLOOD PRESSURE: 167 MMHG | RESPIRATION RATE: 18 BRPM

## 2025-01-21 LAB
ANION GAP SERPL CALC-SCNC: 14 MEQ/L (ref 8–16)
ANION GAP SERPL CALC-SCNC: 16 MEQ/L (ref 8–16)
BASOPHILS ABSOLUTE: 0 THOU/MM3 (ref 0–0.1)
BASOPHILS NFR BLD AUTO: 0.7 %
BUN SERPL-MCNC: 20 MG/DL (ref 7–22)
BUN SERPL-MCNC: 25 MG/DL (ref 7–22)
CALCIUM SERPL-MCNC: 8.1 MG/DL (ref 8.5–10.5)
CALCIUM SERPL-MCNC: 8.1 MG/DL (ref 8.5–10.5)
CHLORIDE SERPL-SCNC: 109 MEQ/L (ref 98–111)
CHLORIDE SERPL-SCNC: 111 MEQ/L (ref 98–111)
CO2 SERPL-SCNC: 16 MEQ/L (ref 23–33)
CO2 SERPL-SCNC: 18 MEQ/L (ref 23–33)
CREAT SERPL-MCNC: 1.3 MG/DL (ref 0.4–1.2)
CREAT SERPL-MCNC: 1.6 MG/DL (ref 0.4–1.2)
DEPRECATED RDW RBC AUTO: 50.3 FL (ref 35–45)
EOSINOPHIL NFR BLD AUTO: 3.1 %
EOSINOPHILS ABSOLUTE: 0.1 THOU/MM3 (ref 0–0.4)
ERYTHROCYTE [DISTWIDTH] IN BLOOD BY AUTOMATED COUNT: 13.9 % (ref 11.5–14.5)
GFR SERPL CREATININE-BSD FRML MDRD: 44 ML/MIN/1.73M2
GFR SERPL CREATININE-BSD FRML MDRD: 56 ML/MIN/1.73M2
GLUCOSE SERPL-MCNC: 117 MG/DL (ref 70–108)
GLUCOSE SERPL-MCNC: 92 MG/DL (ref 70–108)
HCT VFR BLD AUTO: 32.1 % (ref 42–52)
HGB BLD-MCNC: 10.6 GM/DL (ref 14–18)
IMM GRANULOCYTES # BLD AUTO: 0.02 THOU/MM3 (ref 0–0.07)
IMM GRANULOCYTES NFR BLD AUTO: 0.5 %
LYMPHOCYTES ABSOLUTE: 1.2 THOU/MM3 (ref 1–4.8)
LYMPHOCYTES NFR BLD AUTO: 28.5 %
MCH RBC QN AUTO: 32.5 PG (ref 26–33)
MCHC RBC AUTO-ENTMCNC: 33 GM/DL (ref 32.2–35.5)
MCV RBC AUTO: 98.5 FL (ref 80–94)
MONOCYTES ABSOLUTE: 0.8 THOU/MM3 (ref 0.4–1.3)
MONOCYTES NFR BLD AUTO: 19.3 %
NEUTROPHILS ABSOLUTE: 2.1 THOU/MM3 (ref 1.8–7.7)
NEUTROPHILS NFR BLD AUTO: 47.9 %
NRBC BLD AUTO-RTO: 0 /100 WBC
PLATELET # BLD AUTO: 141 THOU/MM3 (ref 130–400)
PMV BLD AUTO: 9.9 FL (ref 9.4–12.4)
POTASSIUM SERPL-SCNC: 4.3 MEQ/L (ref 3.5–5.2)
POTASSIUM SERPL-SCNC: 4.9 MEQ/L (ref 3.5–5.2)
RBC # BLD AUTO: 3.26 MILL/MM3 (ref 4.7–6.1)
SODIUM SERPL-SCNC: 141 MEQ/L (ref 135–145)
SODIUM SERPL-SCNC: 143 MEQ/L (ref 135–145)
WBC # BLD AUTO: 4.3 THOU/MM3 (ref 4.8–10.8)

## 2025-01-21 PROCEDURE — 80048 BASIC METABOLIC PNL TOTAL CA: CPT

## 2025-01-21 PROCEDURE — 96361 HYDRATE IV INFUSION ADD-ON: CPT

## 2025-01-21 PROCEDURE — 85025 COMPLETE CBC W/AUTO DIFF WBC: CPT

## 2025-01-21 PROCEDURE — 2580000003 HC RX 258: Performed by: PHYSICIAN ASSISTANT

## 2025-01-21 PROCEDURE — 6370000000 HC RX 637 (ALT 250 FOR IP): Performed by: PHYSICIAN ASSISTANT

## 2025-01-21 PROCEDURE — G0378 HOSPITAL OBSERVATION PER HR: HCPCS

## 2025-01-21 PROCEDURE — 36415 COLL VENOUS BLD VENIPUNCTURE: CPT

## 2025-01-21 PROCEDURE — 99239 HOSP IP/OBS DSCHRG MGMT >30: CPT | Performed by: PHYSICIAN ASSISTANT

## 2025-01-21 RX ORDER — SODIUM CHLORIDE 9 MG/ML
INJECTION, SOLUTION INTRAVENOUS CONTINUOUS
Status: DISCONTINUED | OUTPATIENT
Start: 2025-01-21 | End: 2025-01-21 | Stop reason: HOSPADM

## 2025-01-21 RX ORDER — PANTOPRAZOLE SODIUM 40 MG/1
40 TABLET, DELAYED RELEASE ORAL
Status: DISCONTINUED | OUTPATIENT
Start: 2025-01-21 | End: 2025-01-21 | Stop reason: HOSPADM

## 2025-01-21 RX ADMIN — SODIUM CHLORIDE: 9 INJECTION, SOLUTION INTRAVENOUS at 15:47

## 2025-01-21 RX ADMIN — SODIUM CHLORIDE: 9 INJECTION, SOLUTION INTRAVENOUS at 02:16

## 2025-01-21 RX ADMIN — TAMSULOSIN HYDROCHLORIDE 0.4 MG: 0.4 CAPSULE ORAL at 10:13

## 2025-01-21 RX ADMIN — CYCLOBENZAPRINE 5 MG: 10 TABLET, FILM COATED ORAL at 10:13

## 2025-01-21 RX ADMIN — PANTOPRAZOLE SODIUM 40 MG: 40 TABLET, DELAYED RELEASE ORAL at 15:46

## 2025-01-21 RX ADMIN — AMLODIPINE BESYLATE 10 MG: 10 TABLET ORAL at 10:13

## 2025-01-21 RX ADMIN — GABAPENTIN 300 MG: 300 CAPSULE ORAL at 10:13

## 2025-01-21 RX ADMIN — PRAVASTATIN SODIUM 40 MG: 40 TABLET ORAL at 10:14

## 2025-01-21 RX ADMIN — LEVETIRACETAM 750 MG: 500 TABLET, FILM COATED ORAL at 10:13

## 2025-01-21 ASSESSMENT — PAIN DESCRIPTION - LOCATION
LOCATION: ABDOMEN;GROIN
LOCATION: BACK

## 2025-01-21 ASSESSMENT — PAIN - FUNCTIONAL ASSESSMENT
PAIN_FUNCTIONAL_ASSESSMENT: ACTIVITIES ARE NOT PREVENTED
PAIN_FUNCTIONAL_ASSESSMENT: ACTIVITIES ARE NOT PREVENTED

## 2025-01-21 ASSESSMENT — PAIN SCALES - GENERAL
PAINLEVEL_OUTOF10: 4
PAINLEVEL_OUTOF10: 2
PAINLEVEL_OUTOF10: 0

## 2025-01-21 ASSESSMENT — PAIN DESCRIPTION - DESCRIPTORS
DESCRIPTORS: ACHING
DESCRIPTORS: ACHING

## 2025-01-21 ASSESSMENT — PAIN DESCRIPTION - ORIENTATION
ORIENTATION: MID
ORIENTATION: MID

## 2025-01-21 NOTE — CARE COORDINATION
1/21/25, 11:09 AM EST  Discharge Planning Evaluation  Social work consult received, patient from [facility].    Patient/Family preference is to return to [facility], per patient.    The patient's current payor source at the facility is Medicaid.   Medicare skilled days available: yes  Medicare does the patient have a three midnight qualifying stay? Not at this time  Insurance precert:  no  Left message for Stella at the facility.  Patient bed hold: yes  Anticipated transport plan: ambulette  Patient's Healthcare Decision Maker: Named in Scanned ACP Document    2:30 PM  Spoke with Brina from [facility], patient is an [facility] resident.  He is able to return.  Advised plan is form discharge later today pending labs. Transport scheduled for 7:30 PM  Brina requested RN called report with medical update and any new medication prior to 4:30.    Readmission Risk Low 0-14, Mod 15-19), High > 20: Readmission Risk Score: 21.6    Current PCP: Jihan Lyon MD  PCP verified by CM? Yes    Patient Orientation: Alert and Oriented    Patient Cognition: Alert  History Provided by: Patient    Advance Directives:      Code Status: Full Code   Patient's Primary Decision Maker is: Named in Scanned ACP Document    Primary Decision Maker: ALLI SAINI - Brother/Sister - [phone]     Discharge Planning:    Patient lives with: Other (Comment) Type of Home: Long Term Care Facility  Primary Care Giver: Self  Patient Support Systems include: Family Members   Current Financial resources: Medicaid, Medicare  Current community resources:    Current services prior to admission: Skilled Nursing Facility            Current DME:              Type of Home Care services:  None    ADLS  Prior functional level: Assistance with the following:, Bathing, Cooking, Housework, Shopping  Current functional level: Assistance with the following:, Bathing, Cooking, Housework, Shopping    Family can provide assistance at DC: No  Would you like Case Management

## 2025-01-21 NOTE — PROGRESS NOTES
[facility] Adult Intravenous to Oral Protocol    pantoprazole changed to PO per [facility] IV to PO protocol.    Patient meets criteria based on the following:  Tolerating diet more advanced than clear liquids  Tolerating other oral medications  Not on vasopressors  No nausea/vomiting within past 24 hours  No active GI bleed  No gastrectomy, gastric outlet or bowel obstruction, ileus, malabsorption syndrome  No seizures for 72 hours (antiepileptics only)    Thank you,  Berta Arevalo R.Ph., BCPS., 1/21/2025,8:55 AM      
appearing. Nares normal. Oral mucosa moist.  Hearing intact.   Neck: Supple, with full range of motion. Trachea midline.  No gross JVD appreciated.  Respiratory:  Normal effort. Clear to auscultation, without rales or wheezes or rhonchi.  Cardiovascular: Normal rate, regular rhythm with normal S1/S2 without murmurs.    No lower extremity edema.   Abdomen: Soft with diffuse tenderness, non-distended with normal bowel sounds. No CVA tenderness.   Musculoskeletal: No joint swelling or tenderness. Normal tone. No abnormal movements.   Skin: Warm and dry. No rashes or lesions.  Neurologic: Decreased sensation bilaterally local extremities noted. No sensory deficits present in upper extremities. Equal strength bilaterally in UE and LE. Cranial nerves: grossly non-focal 2-12.     Psychiatric: Alert and oriented, normal insight and thought content.   Capillary Refill: Brisk,< 3 seconds.  Peripheral Pulses: +2 palpable, equal bilaterally.       Labs/Radiology: See chart or assessment above.     Electronically signed by Emily Farley on 1/21/2025 at 2:53 PM  Electronically signed by CASEY Sanchez on 1/21/2025 at 3:44 PM

## 2025-01-21 NOTE — CARE COORDINATION
1/21/25, 3:04 PM EST    Patient goals/plan/ treatment preferences discussed by  and .  Patient goals/plan/ treatment preferences reviewed with patient/ family.  Patient/ family verbalize understanding of discharge plan and are in agreement with goal/plan/treatment preferences.  Understanding was demonstrated using the teach back method.  AVS provided by RN at time of discharge, which includes all necessary medical information pertaining to the patients current course of illness, treatment, post-discharge goals of care, and treatment preferences.     Services At/After Discharge: Assisted Living and In ambulette       Patient discharged to return to [facility].  Spoke with Brina at [facility], informed of discharge and 7:30 transport.  RN will fax AVS and MAR and call report. Brina requested report called prior to 4:30. Spoke with patient informed of 7:30 transport.  Called patient sister Daina, informed of discharge pending labs and  7:30 transport.

## 2025-01-21 NOTE — PLAN OF CARE
Problem: Discharge Planning  Goal: Discharge to home or other facility with appropriate resources  Outcome: Progressing  Flowsheets (Taken 1/20/2025 2112 by Becky Renee, RN)  Discharge to home or other facility with appropriate resources:   Identify barriers to discharge with patient and caregiver   Arrange for interpreters to assist at discharge as needed   Arrange for needed discharge resources and transportation as appropriate     Problem: Pain  Goal: Verbalizes/displays adequate comfort level or baseline comfort level  Outcome: Progressing     Problem: Safety - Adult  Goal: Free from fall injury  Outcome: Progressing

## 2025-01-21 NOTE — CARE COORDINATION
Case Management Assessment Initial Evaluation    Date/Time of Evaluation: 1/21/2025 12:51 PM  Assessment Completed by: Samira Walton RN    If patient is discharged prior to next notation, then this note serves as note for discharge by case management.    Patient Name: Ceferino Saini Jr                   YOB: 1945  Diagnosis: Urinary retention [R33.9]  BRENNA (acute kidney injury) (HCC) [N17.9]  Acute kidney injury (HCC) [N17.9]  Gastrointestinal hemorrhage, unspecified gastrointestinal hemorrhage type [K92.2]                   Date / Time: 1/20/2025  8:08 AM  [facility]: [facility]     Patient Admission Status: Observation   Readmission Risk Low 0-14, Mod 15-19), High > 20: Readmission Risk Score: 21.6    Current PCP: Jihan Lyon MD  Health Care Decision Makers:   Primary Decision Maker: ALLI SAINI - Brother/Sister - [phone]    Additional Case Management Notes: Admitted through ER from [facility] with diarrhea. Reportedly dark in color. Some low abd pain as well. GI panel ordered. Trend Hgb. Noted to have BRENNA with creat 2.2. IVF. Lisinopril on hold for now. John placed. Creat down to 1.6 this am.     Procedures: No    Imaging:    CT Abd/Pelvis  IMPRESSION:  1. No bowel obstruction or pericolonic inflammation is identified. Fluid within  the nondilated small and large bowel loops is nonspecific and can be clinically  correlated for diarrheal type illness.     2. Cholelithiasis is stable. Nonspecific gallbladder distention is observed.  Correlate with liver function tests.     3. Nonobstructing bilateral nephrolithiasis is stable. No obstructive uropathy  is observed. Bladder wall thickening is unchanged. Correlate with urinalysis.     4. Chronic findings are discussed.       Patient Goals/Plan/Treatment Preferences: From [facility]. SW following.

## 2025-01-21 NOTE — PLAN OF CARE
Problem: Pain  Goal: Verbalizes/displays adequate comfort level or baseline comfort level  1/21/2025 1102 by Dixie Hoang RN  Outcome: Progressing   Patient will have acceptable pain for him to be satisfied    Problem: Safety - Adult  Goal: Free from fall injury  1/21/2025 1102 by Dixie Hoang, RN  Outcome: Progressing   Patient will have no falls or injuries    Problem: Discharge Planning  Goal: Discharge to home or other facility with appropriate resources  1/21/2025 1102 by Dixie Hoang, RN  Outcome: Progressing  Flowsheets (Taken 1/21/2025 1000)  Discharge to home or other facility with appropriate resources:   Identify barriers to discharge with patient and caregiver   Arrange for needed discharge resources and transportation as appropriate   Patient will be discharged when medically stable    Care plan reviewed with patient.  Patient verbalized understanding of the plan of care and contribute to goal setting.

## 2025-01-22 PROBLEM — N18.30 STAGE 3 CHRONIC KIDNEY DISEASE, UNSPECIFIED WHETHER STAGE 3A OR 3B CKD (HCC): Status: ACTIVE | Noted: 2025-01-22

## 2025-01-22 PROBLEM — Z93.59 SUPRAPUBIC CATHETER (HCC): Status: ACTIVE | Noted: 2024-07-15

## 2025-01-22 LAB — BACTERIA UR CULT: NORMAL

## 2025-01-22 NOTE — DISCHARGE SUMMARY
known as: NEURONTIN     GenTeal Mild to Moderate 0.3 % Soln  Generic drug: Hypromellose     * levETIRAcetam 750 MG Tb24 extended release tablet  Commonly known as: KEPPRA XR     * levETIRAcetam 500 MG Tb24 extended release tablet  Commonly known as: KEPPRA XR     lisinopril 10 MG tablet  Commonly known as: PRINIVIL;ZESTRIL  Take 1 tablet by mouth daily     loperamide 2 MG capsule  Commonly known as: IMODIUM     Melatonin 10 MG Tabs     mirtazapine 15 MG tablet  Commonly known as: REMERON     polyethylene glycol 17 g packet  Commonly known as: GLYCOLAX  Take 1 packet by mouth daily     pravastatin 40 MG tablet  Commonly known as: PRAVACHOL  Take 1 tablet by mouth daily     senna 8.6 MG tablet  Commonly known as: SENOKOT  Take 1 tablet by mouth nightly     silver sulfADIAZINE 1 % cream  Commonly known as: SILVADENE     tamsulosin 0.4 MG capsule  Commonly known as: FLOMAX     triamcinolone 0.1 % cream  Commonly known as: KENALOG           * This list has 4 medication(s) that are the same as other medications prescribed for you. Read the directions carefully, and ask your doctor or other care provider to review them with you.                  Time Spent on discharge is more than 45 minutes in the examination, evaluation, counseling and review of medications and discharge plan.    Signed:    Thank you Jihan Lyon MD for the opportunity to be involved in this patient's care.    Electronically signed by CASEY Sanchez on 1/22/25 at 6:46 PM EST

## 2025-02-04 NOTE — PROGRESS NOTES
Ceferino Sanches Jr is a 79 y.o. male who presents today for his medical conditions/complaints as noted below.   Chief Complaint   Patient presents with    Medicare AW    3 Month Follow-Up     F/u chronic medical conditions       HPI:     Ceferino \"Chris\" Myron is a 80 yo male who was seen today for follow up of his chronic medical conditions including HTN, HLD, CKD, WILLIAM, seizures, chronic low back pain, urinary retention, insomnia, and left eye blindness.  He was hospitalized in January with a diarrheal illness with some BRBPR.  CT a/p was negative for acute pathology.   He has had no further diarrhea.  He is following with urology and gets his catheter changed every 2 weeks.  He has been having some low back pain and sciatica down his left leg for the past 3 weeks.  This week he started applying ice to the area and it's helped quite a bit.        Past Medical History:   Diagnosis Date    BRENNA (acute kidney injury) (HCC)     Anxiety disorder     Cancer (HCC)     Squamous Cell Cancer    Cardiac murmur     Constipation     COPD (chronic obstructive pulmonary disease) (HCC)     Dementia associated with other underlying disease without behavioral disturbance (HCC)     Diarrhea     Epilepsy (HCC)     Head injury     HLD (hyperlipidemia)     Hyperkalemia     Hypertension     Insomnia     Malnutrition (HCC)     Mood disturbance     Seizure disorder (HCC)     Tinea pedis     Urinary retention       Past Surgical History:   Procedure Laterality Date    CATARACT REMOVAL      HEMORRHOID SURGERY      SUPRAPUBIC CATHETER         Family History   Problem Relation Age of Onset    Hypertension Mother     Stroke Father        Social History     Tobacco Use    Smoking status: Former     Current packs/day: 0.00     Average packs/day: 0.3 packs/day for 53.0 years (13.3 ttl pk-yrs)     Types: Cigarettes     Start date: 12/10/1965     Quit date: 12/10/2018     Years since quittin.1    Smokeless tobacco: Never   Substance Use Topics    
providers/suppliers regularly involved in providing care):   Patient Care Team:  Jihan Lyon MD as PCP - General (Family Medicine)     Recommendations for Preventive Services Due: see orders and patient instructions/AVS.  Recommended screening schedule for the next 5-10 years is provided to the patient in written form: see Patient Instructions/AVS.     Reviewed and updated this visit:  Tobacco  Allergies  Meds  Problems  Med Hx  Surg Hx  Fam Hx  Sexual   Hx

## 2025-02-05 ENCOUNTER — OUTSIDE SERVICES (OUTPATIENT)
Dept: FAMILY MEDICINE CLINIC | Age: 80
End: 2025-02-05

## 2025-02-05 DIAGNOSIS — N18.30 STAGE 3 CHRONIC KIDNEY DISEASE, UNSPECIFIED WHETHER STAGE 3A OR 3B CKD (HCC): ICD-10-CM

## 2025-02-05 DIAGNOSIS — Z00.00 INITIAL MEDICARE ANNUAL WELLNESS VISIT: ICD-10-CM

## 2025-02-05 DIAGNOSIS — E78.2 MIXED HYPERLIPIDEMIA: ICD-10-CM

## 2025-02-05 DIAGNOSIS — M54.42 CHRONIC LOW BACK PAIN WITH BILATERAL SCIATICA, UNSPECIFIED BACK PAIN LATERALITY: ICD-10-CM

## 2025-02-05 DIAGNOSIS — F02.A0 MILD DEMENTIA ASSOCIATED WITH OTHER UNDERLYING DISEASE, WITHOUT BEHAVIORAL DISTURBANCE, PSYCHOTIC DISTURBANCE, MOOD DISTURBANCE, OR ANXIETY (HCC): ICD-10-CM

## 2025-02-05 DIAGNOSIS — M54.42 ACUTE MIDLINE LOW BACK PAIN WITH LEFT-SIDED SCIATICA: ICD-10-CM

## 2025-02-05 DIAGNOSIS — I10 ESSENTIAL HYPERTENSION: ICD-10-CM

## 2025-02-05 DIAGNOSIS — Z93.59 SUPRAPUBIC CATHETER (HCC): ICD-10-CM

## 2025-02-05 DIAGNOSIS — G89.29 CHRONIC LOW BACK PAIN WITH BILATERAL SCIATICA, UNSPECIFIED BACK PAIN LATERALITY: ICD-10-CM

## 2025-02-05 DIAGNOSIS — D50.0 IRON DEFICIENCY ANEMIA DUE TO CHRONIC BLOOD LOSS: ICD-10-CM

## 2025-02-05 DIAGNOSIS — F41.1 GENERALIZED ANXIETY DISORDER: ICD-10-CM

## 2025-02-05 DIAGNOSIS — H54.40 BLINDNESS OF LEFT EYE WITH NORMAL VISION IN CONTRALATERAL EYE: ICD-10-CM

## 2025-02-05 DIAGNOSIS — G40.909 SEIZURE DISORDER (HCC): ICD-10-CM

## 2025-02-05 DIAGNOSIS — Z00.00 ENCOUNTER FOR MEDICARE ANNUAL WELLNESS EXAM: Primary | ICD-10-CM

## 2025-02-05 DIAGNOSIS — M54.41 CHRONIC LOW BACK PAIN WITH BILATERAL SCIATICA, UNSPECIFIED BACK PAIN LATERALITY: ICD-10-CM

## 2025-02-05 DIAGNOSIS — G47.00 INSOMNIA, UNSPECIFIED TYPE: ICD-10-CM

## 2025-02-12 VITALS
RESPIRATION RATE: 18 BRPM | SYSTOLIC BLOOD PRESSURE: 136 MMHG | BODY MASS INDEX: 28.21 KG/M2 | HEART RATE: 92 BPM | DIASTOLIC BLOOD PRESSURE: 66 MMHG | WEIGHT: 191 LBS | OXYGEN SATURATION: 95 % | TEMPERATURE: 98.2 F

## 2025-02-12 ASSESSMENT — PATIENT HEALTH QUESTIONNAIRE - PHQ9
SUM OF ALL RESPONSES TO PHQ QUESTIONS 1-9: 0
SUM OF ALL RESPONSES TO PHQ QUESTIONS 1-9: 0
SUM OF ALL RESPONSES TO PHQ9 QUESTIONS 1 & 2: 0
SUM OF ALL RESPONSES TO PHQ QUESTIONS 1-9: 0
1. LITTLE INTEREST OR PLEASURE IN DOING THINGS: NOT AT ALL
SUM OF ALL RESPONSES TO PHQ QUESTIONS 1-9: 0
2. FEELING DOWN, DEPRESSED OR HOPELESS: NOT AT ALL

## 2025-03-24 ENCOUNTER — RESULTS FOLLOW-UP (OUTPATIENT)
Dept: GENERAL RADIOLOGY | Age: 80
End: 2025-03-24

## 2025-03-24 ENCOUNTER — HOSPITAL ENCOUNTER (OUTPATIENT)
Age: 80
Setting detail: SPECIMEN
Discharge: HOME | End: 2025-03-24
Payer: MEDICARE

## 2025-03-24 LAB
BACTERIA URNS QL MICRO: ABNORMAL /HPF
BILIRUB UR QL STRIP.AUTO: NEGATIVE
CASTS #/AREA URNS LPF: ABNORMAL /LPF
CASTS 2: ABNORMAL /LPF
CHARACTER UR: ABNORMAL
COLOR, UA: ABNORMAL
CRYSTALS URNS MICRO: ABNORMAL
EPITHELIAL CELLS, UA: ABNORMAL /HPF
GLUCOSE UR QL STRIP.AUTO: NEGATIVE MG/DL
HGB UR QL STRIP.AUTO: ABNORMAL
KETONES UR QL STRIP.AUTO: NEGATIVE
MISCELLANEOUS 2: ABNORMAL
NITRITE UR QL STRIP: POSITIVE
PH UR STRIP.AUTO: 5 [PH] (ref 5–9)
PROT UR STRIP.AUTO-MCNC: 100 MG/DL
RBC URINE: ABNORMAL /HPF
RENAL EPI CELLS #/AREA URNS HPF: ABNORMAL /[HPF]
SP GR UR REFRACT.AUTO: 1.02 (ref 1–1.03)
UROBILINOGEN, URINE: 0.2 EU/DL (ref 0–1)
WBC #/AREA URNS HPF: > 100 /HPF
WBC #/AREA URNS HPF: ABNORMAL /[HPF]
YEAST LIKE FUNGI URNS QL MICRO: ABNORMAL

## 2025-03-24 PROCEDURE — 87186 SC STD MICRODIL/AGAR DIL: CPT

## 2025-03-24 PROCEDURE — 81001 URINALYSIS AUTO W/SCOPE: CPT

## 2025-03-24 PROCEDURE — 87086 URINE CULTURE/COLONY COUNT: CPT

## 2025-03-24 PROCEDURE — 87077 CULTURE AEROBIC IDENTIFY: CPT

## 2025-03-27 LAB
BACTERIA UR CULT: ABNORMAL
ORGANISM: ABNORMAL

## 2025-05-05 VITALS
BODY MASS INDEX: 27.17 KG/M2 | SYSTOLIC BLOOD PRESSURE: 152 MMHG | TEMPERATURE: 97.9 F | OXYGEN SATURATION: 93 % | DIASTOLIC BLOOD PRESSURE: 89 MMHG | WEIGHT: 184 LBS | HEART RATE: 92 BPM | RESPIRATION RATE: 18 BRPM

## 2025-05-05 ASSESSMENT — ENCOUNTER SYMPTOMS
VOMITING: 0
RHINORRHEA: 0
SHORTNESS OF BREATH: 0
SORE THROAT: 0
NAUSEA: 0
EYE REDNESS: 0
CONSTIPATION: 0
COUGH: 0
WHEEZING: 0

## 2025-05-05 NOTE — PROGRESS NOTES
Ceferino Sanches Jr is a 79 y.o. male who presents today for his medical conditions/complaints as noted below.   Chief Complaint   Patient presents with    3 Month Follow-Up     F/u chronic medical conditions       HPI:     Ceferino \"Skiradha\" Myron is a 80 yo male who was seen today for follow up of his chronic medical conditions including HTN, HLD, CKD, WILLIAM, seizures, chronic low back pain, urinary retention, insomnia, and left eye blindness.  Patient's record is reviewed including nursing notes, vital signs, medications, assessments and discussion with nursing staff. Patient's previous note was reviewed today.  His BP fluctuates quite a bit but has been controlled overall.  His weight is down 5 lbs in the past 2 months.  He reports that he fell on 4/8 in his kitchen.  He thinks he tripped on the transition piece between the carpet and linoleum.  He fell to the ground and struck his right wrist.  He fell again on 5/5 walking into his closet.  He sustained a small abrasion to his forehead.  He's not sure what happened, but doesn't believe he had a seizure or passed out.  He denies any chest pain or palpitations.  He has been using his walker more since for ambulation.  He is working with therapy which he feels is beneficial.      Past Medical History:   Diagnosis Date    BRENNA (acute kidney injury)     Anxiety disorder     Cancer (HCC)     Squamous Cell Cancer    Cardiac murmur     Constipation     COPD (chronic obstructive pulmonary disease) (HCC)     Dementia associated with other underlying disease without behavioral disturbance (HCC)     Diarrhea     Epilepsy (HCC)     Head injury     HLD (hyperlipidemia)     Hyperkalemia     Hypertension     Insomnia     Malnutrition     Mood disturbance     Seizure disorder (HCC)     Tinea pedis     Urinary retention       Past Surgical History:   Procedure Laterality Date    CATARACT REMOVAL      HEMORRHOID SURGERY      SUPRAPUBIC CATHETER         Family History   Problem Relation Age

## 2025-05-07 ENCOUNTER — OUTSIDE SERVICES (OUTPATIENT)
Dept: FAMILY MEDICINE CLINIC | Age: 80
End: 2025-05-07
Payer: MEDICARE

## 2025-05-07 DIAGNOSIS — G47.00 INSOMNIA, UNSPECIFIED TYPE: ICD-10-CM

## 2025-05-07 DIAGNOSIS — G40.909 SEIZURE DISORDER (HCC): ICD-10-CM

## 2025-05-07 DIAGNOSIS — H54.40 BLINDNESS OF LEFT EYE WITH NORMAL VISION IN CONTRALATERAL EYE: ICD-10-CM

## 2025-05-07 DIAGNOSIS — G89.29 CHRONIC LOW BACK PAIN WITH BILATERAL SCIATICA, UNSPECIFIED BACK PAIN LATERALITY: ICD-10-CM

## 2025-05-07 DIAGNOSIS — F41.1 GENERALIZED ANXIETY DISORDER: ICD-10-CM

## 2025-05-07 DIAGNOSIS — N18.30 STAGE 3 CHRONIC KIDNEY DISEASE, UNSPECIFIED WHETHER STAGE 3A OR 3B CKD (HCC): Primary | ICD-10-CM

## 2025-05-07 DIAGNOSIS — E78.2 MIXED HYPERLIPIDEMIA: ICD-10-CM

## 2025-05-07 DIAGNOSIS — D50.0 IRON DEFICIENCY ANEMIA DUE TO CHRONIC BLOOD LOSS: ICD-10-CM

## 2025-05-07 DIAGNOSIS — Z93.59 SUPRAPUBIC CATHETER (HCC): ICD-10-CM

## 2025-05-07 DIAGNOSIS — M54.41 CHRONIC LOW BACK PAIN WITH BILATERAL SCIATICA, UNSPECIFIED BACK PAIN LATERALITY: ICD-10-CM

## 2025-05-07 DIAGNOSIS — F02.A0 MILD DEMENTIA ASSOCIATED WITH OTHER UNDERLYING DISEASE, WITHOUT BEHAVIORAL DISTURBANCE, PSYCHOTIC DISTURBANCE, MOOD DISTURBANCE, OR ANXIETY (HCC): ICD-10-CM

## 2025-05-07 DIAGNOSIS — M54.42 CHRONIC LOW BACK PAIN WITH BILATERAL SCIATICA, UNSPECIFIED BACK PAIN LATERALITY: ICD-10-CM

## 2025-05-07 DIAGNOSIS — I10 ESSENTIAL HYPERTENSION: ICD-10-CM

## 2025-05-07 PROCEDURE — 99349 HOME/RES VST EST MOD MDM 40: CPT | Performed by: FAMILY MEDICINE

## 2025-05-07 PROCEDURE — 99491 CHRNC CARE MGMT PHYS 1ST 30: CPT | Performed by: FAMILY MEDICINE

## 2025-09-04 ENCOUNTER — TELEPHONE (OUTPATIENT)
Dept: FAMILY MEDICINE CLINIC | Age: 80
End: 2025-09-04